# Patient Record
Sex: FEMALE | Race: WHITE | NOT HISPANIC OR LATINO | Employment: OTHER | URBAN - METROPOLITAN AREA
[De-identification: names, ages, dates, MRNs, and addresses within clinical notes are randomized per-mention and may not be internally consistent; named-entity substitution may affect disease eponyms.]

---

## 2024-02-28 ENCOUNTER — TELEPHONE (OUTPATIENT)
Dept: ONCOLOGY | Facility: CLINIC | Age: 74
End: 2024-02-28

## 2024-02-28 NOTE — TELEPHONE ENCOUNTER
Received breast referral from Dr. Brooks Staff. Referral from Dr. Meza. Pt will be seeing Dr. Treviño on Wednesday 3/6. Pt would like to be seen in comprehensive clinic with Dr. Brooks and radiation. I have scheduled pt an appt for 3/4 at 2:00. Pt verbalized understanding.

## 2024-03-04 ENCOUNTER — OFFICE VISIT (OUTPATIENT)
Dept: ONCOLOGY | Facility: CLINIC | Age: 74
End: 2024-03-04
Payer: MEDICARE

## 2024-03-04 VITALS
HEART RATE: 80 BPM | RESPIRATION RATE: 17 BRPM | SYSTOLIC BLOOD PRESSURE: 177 MMHG | DIASTOLIC BLOOD PRESSURE: 77 MMHG | WEIGHT: 141.31 LBS | TEMPERATURE: 97 F

## 2024-03-04 VITALS
DIASTOLIC BLOOD PRESSURE: 77 MMHG | RESPIRATION RATE: 17 BRPM | WEIGHT: 141.31 LBS | TEMPERATURE: 97 F | HEART RATE: 80 BPM | SYSTOLIC BLOOD PRESSURE: 177 MMHG

## 2024-03-04 DIAGNOSIS — D51.8 ANEMIA OF DECREASED VITAMIN B12 ABSORPTION: ICD-10-CM

## 2024-03-04 DIAGNOSIS — C50.911 INVASIVE DUCTAL CARCINOMA OF BREAST, FEMALE, RIGHT: Primary | ICD-10-CM

## 2024-03-04 DIAGNOSIS — C50.911 INVASIVE DUCTAL CARCINOMA OF BREAST, FEMALE, RIGHT: ICD-10-CM

## 2024-03-04 PROBLEM — C50.912 INVASIVE DUCTAL CARCINOMA OF LEFT BREAST: Status: ACTIVE | Noted: 2024-03-04

## 2024-03-04 PROBLEM — C50.912 INVASIVE DUCTAL CARCINOMA OF LEFT BREAST: Status: RESOLVED | Noted: 2024-03-04 | Resolved: 2024-03-04

## 2024-03-04 PROBLEM — C50.912 INVASIVE DUCTAL CARCINOMA OF LEFT BREAST: Chronic | Status: RESOLVED | Noted: 2024-03-04 | Resolved: 2024-03-04

## 2024-03-04 PROCEDURE — 1160F RVW MEDS BY RX/DR IN RCRD: CPT | Mod: CPTII,S$GLB,, | Performed by: RADIOLOGY

## 2024-03-04 PROCEDURE — 3077F SYST BP >= 140 MM HG: CPT | Mod: CPTII,S$GLB,, | Performed by: INTERNAL MEDICINE

## 2024-03-04 PROCEDURE — 1101F PT FALLS ASSESS-DOCD LE1/YR: CPT | Mod: CPTII,S$GLB,, | Performed by: INTERNAL MEDICINE

## 2024-03-04 PROCEDURE — 1126F AMNT PAIN NOTED NONE PRSNT: CPT | Mod: CPTII,S$GLB,, | Performed by: RADIOLOGY

## 2024-03-04 PROCEDURE — 1126F AMNT PAIN NOTED NONE PRSNT: CPT | Mod: CPTII,S$GLB,, | Performed by: INTERNAL MEDICINE

## 2024-03-04 PROCEDURE — 3077F SYST BP >= 140 MM HG: CPT | Mod: CPTII,S$GLB,, | Performed by: RADIOLOGY

## 2024-03-04 PROCEDURE — 1159F MED LIST DOCD IN RCRD: CPT | Mod: CPTII,S$GLB,, | Performed by: RADIOLOGY

## 2024-03-04 PROCEDURE — 99205 OFFICE O/P NEW HI 60 MIN: CPT | Mod: S$GLB,,, | Performed by: RADIOLOGY

## 2024-03-04 PROCEDURE — 99215 OFFICE O/P EST HI 40 MIN: CPT | Mod: S$GLB,,, | Performed by: INTERNAL MEDICINE

## 2024-03-04 PROCEDURE — 3288F FALL RISK ASSESSMENT DOCD: CPT | Mod: CPTII,S$GLB,, | Performed by: INTERNAL MEDICINE

## 2024-03-04 PROCEDURE — 3078F DIAST BP <80 MM HG: CPT | Mod: CPTII,S$GLB,, | Performed by: INTERNAL MEDICINE

## 2024-03-04 PROCEDURE — 3078F DIAST BP <80 MM HG: CPT | Mod: CPTII,S$GLB,, | Performed by: RADIOLOGY

## 2024-03-04 NOTE — PROGRESS NOTES
[facility] initial encounter note    March 4, 2024    Subjective:      Patient ID:   Dora Atwood  74 y.o. female  1950  Waleska Plasencia, Javier Villanueva MD's      Chief Complaint:   R breast Cancer    HPI:  74 y.o. female palpated a hard lump at the area of the tail of the right breast and the anterior right axilla.  The mass was hard, large, but movable and not fixed.  Dr. Meza, her PMD ordered a bilateral diagnostic mammogram and ultrasound.      She has inversion of the left and right nipple, she says this is a chronic finding and her breasts have always been with the inversion.  Pleomorphic microcalcifications extending from the upper central to the upper outer quadrant of the right breast are noted and cover an area of approximately 4 x 8 cm, these are new from prior studies.  The left breast shows no masses or microcalcifications.  There is a large mass at the right axilla measuring 4.5 cm in diameter.      Ultrasound of right breast and axilla show a 1 cm mass 3 cm from the nipple at the 9 o'clock position.  In the skin of the lateral and inferior right breast there are numerous small hypoechoic nodules on the order of 3-6 mm in size.  Ultrasound of the axilla shows a large mass with irregular borders at 3 x 4.8 cm.  Just lateral to this mass is a smaller mass at 3 x 2 cm.  The radiologist recommends biopsy of the nodule at 9:00 a.m. and the large right axillary mass presumed to be a metastatic lymph node.  He notes if the biopsy of the right breast nodule fails to demonstrate concordant histologic diagnosis, then stereotactic biopsy of the microcalcifications can be performed.    Right breast biopsy at 9:00 a.m. 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  Focal ductal carcinoma insight 2, intermediate grade, solid pattern was seen.  ERP was negative, PRP was negative, HER2 Lisa was 3+  positive    Biopsy the right axilla lymph node returned positive for metastatic carcinoma.  Residual lymphoid architecture is not seen.    She had onset of menses at age 14.  She is a  2 para 2 miscarriage 0 individual.  She delivered her 1st child at the maternal age of 25 and the 2nd child at the maternal age of 30.  She took birth control pills for approximately 10 years.  She had bilateral tubal ligation at age 30.  She went through the menopause in her mid 40s and she did take hormone replacement therapy for approximately 10 years.      Mom had rectal cancer, dad had diabetes and heart disease.  A paternal aunt had breast cancer, maternal uncle  in his 20s of some type of cancer.  A brother has heart disease.  Another brother  of an overdose, and a 3rd brother  of unknown cause.  She has a sister that she believes may have type of cancer and another sister with diabetes.  Her son and daughter are alive and well.  There is no family history known to her of ovarian, prostate, pancreas, or melanoma cancers.        ROS:   GEN: normal without any fever, night sweats or weight loss  HEENT: normal with no HA's, sore throat, stiff neck, changes in vision  CV: normal with no CP, SOB, PND, CARNEY or orthopnea  PULM: normal with no SOB, cough, hemoptysis, sputum or pleuritic pain  GI: normal with no abdominal pain, nausea, vomiting, constipation, diarrhea, melanotic stools, BRBPR, or hematemesis  : normal with no hematuria, dysuria  BREAST: See HPI  SKIN: See HPI     Hx BTL, cholecystectomy.    Review of patient's allergies indicates:  No Known Allergies    No meds.    Objective:   Vitals:  Blood pressure (!) 177/77, pulse 80, temperature 97.3 °F (36.3 °C), resp. rate 17, weight 64.1 kg (141 lb 4.8 oz).    Physical Examination:   GEN: no apparent distress, comfortable  HEAD: atraumatic and normocephalic  EYES: no pallor, no icterus  ENT: no pharyngeal erythema, external ears WNL; no nasal  discharge  NECK: no masses, thyroid normal, trachea midline, no LAD/LN's, supple  CV: RRR with no murmur; normal pulse  CHEST: Normal respiratory effort; CTAB; normal breath sounds; no wheeze or crackles  ABDOM: nontender and nondistended; soft; normal bowel sounds; no rebound/guarding  MUSC/Skeletal: ROM normal; no crepitus; joints normal; no deformities or arthropathy  EXTREM: no clubbing, cyanosis, inflammation or swelling  SKIN: no rashes, lesions, ulcers, petechiae or subcutaneous nodules  : no cvat  NEURO: grossly intact; motor/sensory WNL; no tremors  PSYCH: normal mood, affect and behavior  LYMPH: normal cervical, supraclavicular, and groin LN's  BREASTS:  Left breast is nontender, without discharge, and without palpable mass, left axilla is without palpable mass or lymphadenopathy.  Right breast has edema noted as compared to the right.  She does not have peau de orange change, and skin is intact.  I do not palpate the 1 cm tumor at the 9 o'clock position of the right breast.  She has a golf ball sized mass at the tail of the breast and anterior axillary area that is hard and movable, not fixed.  I do not palpate the 2nd lymph node in the right axilla as referenced on the radiology studies.    CBC, CMP, breast tumor markers will be ordered.  Echocardiogram for ejection fraction will be ordered.  MRI of the brain with contrast for staging will be done.  MRI of the left and right breast with contrast to check for a 2nd primary in the left breast, given her lobular features, will be done.  PET scan is being done for staging, to check for metastatic disease?    Assessment:   (1) 74 y.o. female abnormal mammogram and ultrasound with 4.8 cm mass noted at the anterior axilla and tell of the breast, also a large area of new micro calcifications are seen in the upper outer quadrant of the right breast, and a 1 cm nodule is noted at the 9 o'clock position of the right breast.    (2) biopsy of the 9:00 a.m. nodule  and the anterior axillary mass returned showing invasive mixed ductal and lobular carcinoma, grade 2, ERP negative, PRP negative, HER2 Lisa 3+ positive.    (3) my impression at this time is that she is at least stage II.  Staging studies will include MRI with contrast of the brain, MRI with contrast of the left and right breast, PET scan will be done to check for metastatic disease.  CBC, CMP, breast tumor markers have been requested and echocardiogram for ejection fraction has been requested.    (4) because of the negative ERP and PRP studies, tamoxifen or Arimidex would not be expected to help adjuvantly in decreasing her long-term systemic recurrence risk.    (5) the strongly 3+ positive HER2 Lisa would support that neoadjuvant carboplatin, taxane, Herceptin, Perjeta q. 3 weeks times 4-6 cycle may achieve a CHRIS state at the breast and axillary area after treatment.    (6) she does have the large area microcalcifications at the right breast in addition to the 9:00 a.m. biopsy-proven mass at the right breast.  The initial opinion as discussed with Dr. Villanueva, would be that she would require a right mastectomy and probably a right axillary node dissection, after the completion of neoadjuvant CTHP    (7) as discussed with Dr. Villanueva, she will probably need chest irradiation and right axillary radiation, after her definitive surgery.    (8) will forward a copy of this note to Dr. Treviño, she sees him on Wednesday.  Will ask Dr. Treviño to place a carisa catheter to facilitate systemic neoadjuvant treatment.    For now the PET scan is scheduled March 6 at 3:45 p.m..  Echocardiogram is scheduled at March 8th at 2:30 p.m..  MRI of brain is scheduled at March 8th at 3:00 p.m.  MRI of the breast is scheduled for March 21st at 3:15 p.m.  Iram is going to work on trying to get the MRI of the breast sooner.    (9) knowledge of her family history of cancer is incomplete.  A paternal aunt had breast cancer, a maternal uncle had  cancer of unknown type, a sister gives a history of possible cancer.  I have requested BRCA 1 and BRCA 2 testing with an extended profile.    I am going to ask her to follow up with myself during the week of March 11th to review the study results.  I am trying to get her started on the neoadjuvant treatment towards the end of March 11th week after the port has been placed and the local site has healed sufficiently.    I have spent 1 hour interviewing and the patient and examining the patient and discussing my findings and recommendations as outlined above.  It has taken me approximately another hour to summarize the reports, discuss with Dr. Villanueva, schedule her studies with Iram and to place this note into epic.    Alexys Boyer MD  [facility]  3/4/24

## 2024-03-04 NOTE — PROGRESS NOTES
Dora Atwood  [id number]  1950  3/4/2024  No referring provider defined for this encounter.    REASON FOR CONSULTATION: IIA, [id number] g2 IDC/ILC of UOQ R breast, ER(-)/IL(-)/HER2(3+)    TREATMENT GOAL: adjuvant    HISTORY OF PRESENT ILLNESS:   Dora Atwood is a 74 y.o. post-menopausal female with (+; paternal aunt) family history of breast cancer presented with palpable mass in the right axilla with diagnostic mammogram noting pleomorphic microcalcifications throughout the upper outer quadrant of the right breast covering 4 x 8 cm region of the large right axillary mass measuring 4.5 cm.  Ultrasound confirmed a 1 cm spiculated hypoechoic nodule in the right breast at 9:00, numerous skin based small hypoechoic nodules, 4.8 cm right axillary lymph node with an adjacent 2 x 3 cm lymph node.  Core needle biopsy returned g2 (6-7/9) invasive carcinoma with ductal and lobular features with associated grade 2 DCIS.  Biopsy from the right axilla returned metastatic carcinoma.  Tumor was ER negative, IL negative, HER2 3+.    She presents to [facility] at [facility] to meet with Dr. Brooks (Medical Oncology) and myself (Radiation Oncology) to formulate treatment plan.  She sees Dr. Treviño of General surgery.    Patient denies pain or discomfort of the breast.  She denies swelling or decreased range of motion of the right upper extremity.  Denies nipple discharge.  Denies bone pain.  Reports enlargement at right axilla occurred in the past 1 month.    Review of systems otherwise negative unless indicated in HPI.    No past medical history on file.  No past surgical history on file.  Social History     Socioeconomic History    Marital status: Single     Family History   Problem Relation Age of Onset    Rectal cancer Mother     Breast cancer Maternal Aunt        PRIOR HISTORY OF CHEMOTHERAPY OR RADIOTHERAPY: Please see HPI for patients prior oncologic history.      Review of patient's allergies  indicates:  No Known Allergies    QUALITY OF LIFE: 100%- Normal, No Complaints, No Evidence of Disease    Vitals:    03/04/24 1404   BP: (!) 177/77   Pulse: 80   Resp: 17   Temp: 97.3 °F (36.3 °C)   Weight: 64.1 kg (141 lb 4.8 oz)   PainSc: 0-No pain     There is no height or weight on file to calculate BMI.    PHYSICAL EXAM:   GENERAL: alert; in no apparent distress.   HEAD: normocephalic, atraumatic.  EYES: pupils are equal, round, reactive to light and accommodation. Sclera anicteric. Conjunctiva not injected.   NOSE/THROAT: no nasal erythema or rhinorrhea. Oropharynx pink, without erythema, ulcerations or thrush.   NECK: no cervical motion rigidity; supple with no masses.    CHEST: Patient is speaking comfortably on room air with normal work of breathing without using accessory muscles of respiration.  CARDIOVASCULAR: regular rate and rhythm  ABDOMEN: soft, nontender, nondistended.   MUSCULOSKELETAL: no tenderness to palpation along the spine or scapulae. Normal range of motion.  NEUROLOGIC: cranial nerves II-XII intact bilaterally. Strength 5/5 in bilateral upper and lower extremities. No sensory deficits appreciated. Normal gait.  LYMPHATIC: bulky R level I-II axillary adenopathy appreciated at >5cm. ?fixed   EXTREMITIES: no clubbing, cyanosis, edema.  SKIN: no erythema, rashes, ulcerations noted.   BREAST: small palpable nodule at 9:00 with trace edema.  No peau de orange, erythema, cellulitis or fluctuance; no ulceration.    REVIEW OF IMAGING/PATHOLOGY/LABS: Please see HPI. All images reviewed personally by dictating physician.     ASSESSMENT: Dora Atwood is a 74 y.o. female with stage IIA, [id number] g2 IDC/ILC of UOQ R breast, ER(-)/RI(-)/HER2(3+)  PLAN:  Dora Atwood presents with a large palpable high right axillary mass with diagnostic mammogram and ultrasound confirming a 1cm spiculated nodule in the right breast, (presumed site of origin), additional skin based small hypoechoic nodules and 8 cm of  pleomorphic calcifications in the UOQ.  She has nearly 8 cm of axillary disease.  Core needle biopsy returned grade 2 mixed invasive ductal/lobular carcinoma, hormone receptor negative, HER2 3+.  Consensus recommendation is to proceed with BRCA testing, noting family history, as well as staging PET-CT and pretreatment MRI of the breast.  Dr. Brooks met with the patient today and discuss neoadjuvant TCHP as well as potential adjuvant indications for anti-HER2 therapy.  She will meet with Dr. Treviño to discuss surgical options.  Will await MRI but noting the primary site with bulky high axillary adenopathy, diffuse pleomorphic calcifications and questionable skin nodules, she may be best served by mastectomy.  She will address this with potential reconstruction options with Dr. Treviño.  Today I briefly discussed sentinel lymph node biopsy versus axillary lymph node dissection which may be warranted pending post neoadjuvant TCHP MRI and operative findings, again noting extent of bulky adenopathy at today's visit.    Today I discussed utility of adjuvant radiotherapy following mastectomy in clinically node positive patients at high risk for local recurrence with demonstrated survival benefit.  Citing factors above she is certainly at risk for local recurrence within the chest wall or draining lymphatics and I do advise adjuvant radiotherapy to these sites, 5040 cGy with potential boosting any residual/undissected LNs (level III, ie).  Chest wall boost to be dictated by final pathology.  I anticipate treatment using 3D conformal techniques using a tangential beam approach and deep inspiratory breath protocol to spare the underlying lung.  Treatment likely to be done concurrently with anti HER2 therapy.    She understands that if M1 on PET, treatment recommendations may change.    I carefully explained the process of simulation and treatment delivery with weekly physician visits. Patient wishes to proceed.     We discussed  the risks and benefits of the above treatment and have gone over in detail the acute and late toxicities of radiation therapy to the R CW + RNI.     Consent deferred.  Will plan on meeting with the patient 4 weeks postoperatively to discuss radiotherapy in detail.    The patient has our contact information and understands that they are free to contact us at any time with questions or concerns regarding radiation therapy.     DISPOSITION: RTC FOR CT SIM AFTER CURRENT THERAPY     I have personally seen and evaluated this patient with a high complexity diagnosis.      Greater than 60 minutes were dedicated to reviewing/interpreting pertinent laboratory/imaging/pathology as well as prior consultations; reviewing and performing history and physical; counseling patient on oncologic recommendations; documentation in the electronic medical record including ordering of additional tests and/or radiation treatment protocol; and coordination of care with physicians with referrals placed as appropriate.    PHYSICIAN: Meño Villanueva Jr, MD    Thank you for the opportunity to meet and consult with Dora Atwood.   Please feel free to contact me to discuss the above recommendation further.

## 2024-03-04 NOTE — LETTER
March 4, 2024        Demarco Meza MD  [address]  [address]  [place] [address]             [facility]  [address], [address]  [place] [address]  Phone: [phone]  Fax: [phone]   Patient: Dora Atwood   MR Number: [id number]   YOB: 1950   Date of Visit: 3/4/2024       Dear Dr. Meza:    Thank you for referring Dora Atwood to me for evaluation. Below are the relevant portions of my assessment and plan of care.    No diagnosis found.  There are no diagnoses linked to this encounter.  No follow-ups on file.    I have explained and the patient understands all of  the current recommendation(s). I have answered all of their questions to the best of my ability and to their complete satisfaction.           If you have questions, please do not hesitate to call me. I look forward to following Dora along with you.    Sincerely,      SIMONE Brooks MD           CC    No Recipients

## 2024-03-04 NOTE — Clinical Note
If M0, TCHP then likely mastectomy + ax dissection (Gray); us 4wks post-op for R CW + nodes RT using DIBH

## 2024-03-05 ENCOUNTER — TELEPHONE (OUTPATIENT)
Dept: HEMATOLOGY/ONCOLOGY | Facility: CLINIC | Age: 74
End: 2024-03-05

## 2024-03-05 NOTE — TELEPHONE ENCOUNTER
Met with pt and  in comprehensive breast clinic on 3/4.  I answered questions. Ambry genetic testing was drawn during this visit. Pt was given a copy of my information as well as the providers she saw during the visit. I instructed pt to contact me at anytime with questions or concerns.  She verbalized understanding.  I was able to schedule pt appts for pet scan, echo, mri brain, and mri breast while pt in office.         Pt would like to reschedule appt with dr. Treviño due to all the appts with the scans. Her  is going out of town. I spoke with Dr. Butcher office. Pt rescheduled for 3/12 at 2:30. Spoke with pt. She verbalized understanding.

## 2024-03-06 ENCOUNTER — HOSPITAL ENCOUNTER (OUTPATIENT)
Dept: RADIOLOGY | Facility: HOSPITAL | Age: 74
Discharge: HOME | End: 2024-03-06
Attending: INTERNAL MEDICINE
Payer: MEDICARE

## 2024-03-06 DIAGNOSIS — C50.911 INVASIVE DUCTAL CARCINOMA OF BREAST, FEMALE, RIGHT: ICD-10-CM

## 2024-03-06 LAB — GLUCOSE SERPL-MCNC: 83 MG/DL (ref 70–110)

## 2024-03-06 PROCEDURE — A9552 F18 FDG: HCPCS | Mod: PN | Performed by: INTERNAL MEDICINE

## 2024-03-06 PROCEDURE — 78815 PET IMAGE W/CT SKULL-THIGH: CPT | Mod: 26,PI,, | Performed by: RADIOLOGY

## 2024-03-06 PROCEDURE — 78815 PET IMAGE W/CT SKULL-THIGH: CPT | Mod: TC,PN

## 2024-03-06 RX ORDER — FLUDEOXYGLUCOSE F18 500 MCI/ML
13.1 INJECTION INTRAVENOUS
Status: COMPLETED | OUTPATIENT
Start: 2024-03-06 | End: 2024-03-06

## 2024-03-06 RX ADMIN — FLUDEOXYGLUCOSE F-18 13.1 MILLICURIE: 500 INJECTION INTRAVENOUS at 03:03

## 2024-03-06 NOTE — PROGRESS NOTES
PET Imaging Questionnaire    Are you a Diabetic? Recent Blood Sugar level? No    Are you anemic? Bone Marrow Stimulation Meds? No    Have you had a CT Scan, if so when & where was your last one? No    Have you had a PET Scan, if so when & where was your last one? No    Chemotherapy or currently on Chemotherapy? No    Radiation therapy? No    Surgical History: No past surgical history on file.     Have you been fasting for at least 6 hours? Yes    Is there any chance you may be pregnant or breastfeeding? No    Assay: 13.5 MCi@:15:06   Injection Site: AC    Residual: 0.4 mCi@: 15:10   Technologist: Jerry Acuña Injected:13.1 mCi

## 2024-03-08 ENCOUNTER — CLINICAL SUPPORT (OUTPATIENT)
Dept: CARDIOLOGY | Facility: HOSPITAL | Age: 74
End: 2024-03-08
Attending: INTERNAL MEDICINE
Payer: MEDICARE

## 2024-03-08 VITALS — BODY MASS INDEX: 23.54 KG/M2 | HEIGHT: 65 IN | WEIGHT: 141.31 LBS

## 2024-03-08 DIAGNOSIS — C50.911 INVASIVE DUCTAL CARCINOMA OF BREAST, FEMALE, RIGHT: ICD-10-CM

## 2024-03-08 PROCEDURE — 93306 TTE W/DOPPLER COMPLETE: CPT

## 2024-03-08 PROCEDURE — 93306 TTE W/DOPPLER COMPLETE: CPT | Mod: 26,,, | Performed by: INTERNAL MEDICINE

## 2024-03-11 ENCOUNTER — TELEPHONE (OUTPATIENT)
Dept: HEMATOLOGY/ONCOLOGY | Facility: CLINIC | Age: 74
End: 2024-03-11

## 2024-03-11 DIAGNOSIS — C50.911 INVASIVE DUCTAL CARCINOMA OF BREAST, FEMALE, RIGHT: Primary | ICD-10-CM

## 2024-03-14 ENCOUNTER — HOSPITAL ENCOUNTER (OUTPATIENT)
Dept: RADIOLOGY | Facility: HOSPITAL | Age: 74
Discharge: HOME | End: 2024-03-14
Payer: MEDICARE

## 2024-03-14 ENCOUNTER — HOSPITAL ENCOUNTER (OUTPATIENT)
Dept: PREADMISSION TESTING | Facility: HOSPITAL | Age: 74
Discharge: HOME | End: 2024-03-14
Attending: SURGERY
Payer: MEDICARE

## 2024-03-14 VITALS
DIASTOLIC BLOOD PRESSURE: 76 MMHG | WEIGHT: 143.31 LBS | HEART RATE: 89 BPM | OXYGEN SATURATION: 98 % | SYSTOLIC BLOOD PRESSURE: 131 MMHG | RESPIRATION RATE: 18 BRPM | TEMPERATURE: 98 F | HEIGHT: 65 IN | BODY MASS INDEX: 23.88 KG/M2

## 2024-03-14 DIAGNOSIS — Z01.818 PRE-OP TESTING: ICD-10-CM

## 2024-03-14 DIAGNOSIS — Z01.818 PRE-OP TESTING: Primary | ICD-10-CM

## 2024-03-14 PROCEDURE — 93005 ELECTROCARDIOGRAM TRACING: CPT | Performed by: GENERAL PRACTICE

## 2024-03-14 PROCEDURE — 71046 X-RAY EXAM CHEST 2 VIEWS: CPT | Mod: TC

## 2024-03-14 PROCEDURE — 93010 ELECTROCARDIOGRAM REPORT: CPT | Mod: ,,, | Performed by: GENERAL PRACTICE

## 2024-03-14 RX ORDER — MULTIVITAMIN
1 TABLET ORAL DAILY
COMMUNITY

## 2024-03-14 RX ORDER — CEFAZOLIN SODIUM 2 G/50ML
2 SOLUTION INTRAVENOUS ONCE
Status: CANCELLED | OUTPATIENT
Start: 2024-03-19

## 2024-03-14 RX ORDER — FERROUS GLUCONATE 324(38)MG
324 TABLET ORAL
COMMUNITY

## 2024-03-14 NOTE — DISCHARGE INSTRUCTIONS
To confirm, Your doctor has instructed you that surgery is scheduled for:   Tuesday, March 19, 2024    Pre-Op will call the afternoon prior to surgery between 4:00 and 6:00 PM with the final arrival time.    Monday, March 18, 2024    Please report to [facility] [place] via [facility] entrance. Check in at registration desk.    Do not eat or drink anything after midnight the night before your surgery - THIS INCLUDES  WATER, GUM, MINTS AND CANDY.  YOU MAY BRUSH YOUR TEETH BUT DO NOT SWALLOW       PLEASE NOTE:  The surgery schedule has many variables which may affect the time of your surgery case.  Family members should be available if your surgery time changes.  Plan to be here the day of your procedure between 4-6 hours.      DO NOT TAKE THESE MEDICATIONS 5-7 DAYS PRIOR to your procedure or per your surgeon's request: ASPIRIN, ALEVE, ADVIL, IBUPROFEN,  ANA SELTZER, BC , FISH OIL , VITAMIN E, HERBALS  (May take Tylenol)                                                      IMPORTANT INSTRUCTIONS    Shower the night before AND the morning of your procedure with a Chlorhexidine wash such as Hibiclens or Dial antibacterial soap from the neck down. Do not apply any deodorants, lotions or powders after each shower.  Do not get it on your face or in your eyes.  You may use your own shampoo and face wash. This helps your skin to be as bacteria free as possible.  Sleep in a bed with clean sheets.  Do not sleep with a pet in the bed.   Please leave all jewelry, piercing's and valuables at home.     Make arrangements in advance for transportation home by a responsible adult.    You must make arrangements for transportation, TAXI'S, UBER'S OR LYFTS ARE NOT ALLOWED.      If you have any questions about these instructions, call Pre-Op Admit  Nursing at [phone] or the Pre-Op Day Surgery Unit at [phone].

## 2024-03-15 ENCOUNTER — HOSPITAL ENCOUNTER (OUTPATIENT)
Dept: RADIOLOGY | Facility: HOSPITAL | Age: 74
Discharge: HOME | End: 2024-03-15
Attending: INTERNAL MEDICINE
Payer: MEDICARE

## 2024-03-15 ENCOUNTER — OFFICE VISIT (OUTPATIENT)
Dept: HEMATOLOGY/ONCOLOGY | Facility: CLINIC | Age: 74
End: 2024-03-15
Payer: MEDICARE

## 2024-03-15 VITALS
TEMPERATURE: 97 F | HEIGHT: 65 IN | BODY MASS INDEX: 23.91 KG/M2 | HEART RATE: 74 BPM | DIASTOLIC BLOOD PRESSURE: 89 MMHG | RESPIRATION RATE: 18 BRPM | WEIGHT: 143.5 LBS | SYSTOLIC BLOOD PRESSURE: 146 MMHG

## 2024-03-15 DIAGNOSIS — C50.911 INVASIVE DUCTAL CARCINOMA OF BREAST, FEMALE, RIGHT: Primary | ICD-10-CM

## 2024-03-15 DIAGNOSIS — C50.911 INVASIVE DUCTAL CARCINOMA OF BREAST, FEMALE, RIGHT: ICD-10-CM

## 2024-03-15 PROCEDURE — 99214 OFFICE O/P EST MOD 30 MIN: CPT | Mod: S$GLB,,, | Performed by: INTERNAL MEDICINE

## 2024-03-15 PROCEDURE — 70470 CT HEAD/BRAIN W/O & W/DYE: CPT | Mod: TC

## 2024-03-15 PROCEDURE — 1101F PT FALLS ASSESS-DOCD LE1/YR: CPT | Mod: CPTII,S$GLB,, | Performed by: INTERNAL MEDICINE

## 2024-03-15 PROCEDURE — 3079F DIAST BP 80-89 MM HG: CPT | Mod: CPTII,S$GLB,, | Performed by: INTERNAL MEDICINE

## 2024-03-15 PROCEDURE — 1159F MED LIST DOCD IN RCRD: CPT | Mod: CPTII,S$GLB,, | Performed by: INTERNAL MEDICINE

## 2024-03-15 PROCEDURE — 3288F FALL RISK ASSESSMENT DOCD: CPT | Mod: CPTII,S$GLB,, | Performed by: INTERNAL MEDICINE

## 2024-03-15 PROCEDURE — 1125F AMNT PAIN NOTED PAIN PRSNT: CPT | Mod: CPTII,S$GLB,, | Performed by: INTERNAL MEDICINE

## 2024-03-15 PROCEDURE — 3008F BODY MASS INDEX DOCD: CPT | Mod: CPTII,S$GLB,, | Performed by: INTERNAL MEDICINE

## 2024-03-15 PROCEDURE — 3077F SYST BP >= 140 MM HG: CPT | Mod: CPTII,S$GLB,, | Performed by: INTERNAL MEDICINE

## 2024-03-15 PROCEDURE — 25500020 PHARM REV CODE 255: Performed by: INTERNAL MEDICINE

## 2024-03-15 RX ORDER — HYDROCODONE BITARTRATE AND ACETAMINOPHEN 5; 325 MG/1; MG/1
1 TABLET ORAL EVERY 6 HOURS PRN
Qty: 30 TABLET | Refills: 0 | Status: SHIPPED | OUTPATIENT
Start: 2024-03-15

## 2024-03-15 RX ADMIN — IOHEXOL 50 ML: 350 INJECTION, SOLUTION INTRAVENOUS at 01:03

## 2024-03-15 NOTE — PROGRESS NOTES
[facility] Hematology Oncology Subsequent  encounter note    March 15, 2024    Subjective:      Patient ID:   Dora Atwood  74 y.o. female  1950  Waleska Plasencia, Javier Villanueva MD's      Chief Complaint:   R breast Cancer    HPI:  One hour spent discussing, examining, planning neoadj. Rx. Today.  74 y.o. female palpated a hard lump at the area of the tail of the right breast and the anterior right axilla.  The mass was hard, large, but movable and not fixed.  Dr. Meza, her PMD ordered a bilateral diagnostic mammogram and ultrasound.      She has inversion of the left and right nipple, she says this is a chronic finding and her breasts have always been with the inversion.  Pleomorphic microcalcifications extending from the upper central to the upper outer quadrant of the right breast are noted and cover an area of approximately 4 x 8 cm, these are new from prior studies.  The left breast shows no masses or microcalcifications.  There is a large mass at the right axilla measuring 4.5 cm in diameter.      Ultrasound of right breast and axilla show a 1 cm mass 3 cm from the nipple at the 9 o'clock position.  In the skin of the lateral and inferior right breast there are numerous small hypoechoic nodules on the order of 3-6 mm in size.  Ultrasound of the axilla shows a large mass with irregular borders at 3 x 4.8 cm.  Just lateral to this mass is a smaller mass at 3 x 2 cm.  The radiologist recommends biopsy of the nodule at 9:00 a.m. and the large right axillary mass presumed to be a metastatic lymph node.  He notes if the biopsy of the right breast nodule fails to demonstrate concordant histologic diagnosis, then stereotactic biopsy of the microcalcifications can be performed.    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  Focal ductal carcinoma insight 2, intermediate grade, solid pattern was  seen.  ERP was negative, PRP was negative, HER2 Lisa was 3+ positive    Biopsy the right axilla lymph node returned positive for metastatic carcinoma.  Residual lymphoid architecture is not seen.    She had onset of menses at age 14.  She is a  2 para 2 miscarriage 0 individual.  She delivered her 1st child at the maternal age of 25 and the 2nd child at the maternal age of 30.  She took birth control pills for approximately 10 years.  She had bilateral tubal ligation at age 30.  She went through the menopause in her mid 40s and she did take hormone replacement therapy for approximately 10 years.      Mom had rectal cancer, dad had diabetes and heart disease.  A paternal aunt had breast cancer, maternal uncle  in his 20s of some type of cancer.  A brother has heart disease.  Another brother  of an overdose, and a 3rd brother  of unknown cause.  She has a sister that she believes may have type of cancer and another sister with diabetes.  Her son and daughter are alive and well.  There is no family history known to her of ovarian, prostate, pancreas, or melanoma cancers.      CAT of head negative for metastatic dx.  ECHO call for Ej Fx  MRI of breast due next week.  PET uptake in mass, R supraclavicular LN, infraclavicular LN, internal mammary LN, R axillary LN, and mild increase in FGD  with skin thickening and edema.    Hgb 11.9., CMP NL.  BrCa 1 & 2 pending.  For CXR, port placement 3/19/24.      ROS:   GEN: normal without any fever, night sweats or weight loss  HEENT: normal with no HA's, sore throat, stiff neck, changes in vision  CV: normal with no CP, SOB, PND, CARNEY or orthopnea  PULM: normal with no SOB, cough, hemoptysis, sputum or pleuritic pain  GI: normal with no abdominal pain, nausea, vomiting, constipation, diarrhea, melanotic stools, BRBPR, or hematemesis  : normal with no hematuria, dysuria  BREAST: See HPI  SKIN: See HPI     Hx BTL, cholecystectomy.    Review of patient's allergies  "indicates:  No Known Allergies    No meds.    Objective:   Vitals:  Blood pressure (!) 146/89, pulse 74, temperature 96.9 °F (36.1 °C), resp. rate 18, height 5' 5" (1.651 m), weight 65.1 kg (143 lb 8 oz).    Physical Examination:   GEN: no apparent distress, comfortable  HEAD: atraumatic and normocephalic  EYES: no pallor, no icterus  ENT: no pharyngeal erythema, external ears WNL; no nasal discharge  NECK: no masses, thyroid normal, trachea midline, no LAD/LN's, supple  CV: RRR with no murmur; normal pulse  CHEST: Normal respiratory effort; CTAB; normal breath sounds; no wheeze or crackles  ABDOM: nontender and nondistended; soft; normal bowel sounds; no rebound/guarding  MUSC/Skeletal: ROM normal; no crepitus; joints normal; no deformities or arthropathy  EXTREM: no clubbing, cyanosis, inflammation or swelling  SKIN: no rashes, lesions, ulcers, petechiae or subcutaneous nodules  : no cvat  NEURO: grossly intact; motor/sensory WNL; no tremors  PSYCH: normal mood, affect and behavior  LYMPH: normal cervical, supraclavicular, and groin LN's  BREASTS:  Left breast is nontender, without discharge, and without palpable mass, left axilla is without palpable mass or lymphadenopathy.  Right breast has edema noted as compared to the right.  She does not have peau de orange change, and skin is intact.  I do not palpate the 1 cm tumor at the 9 o'clock position of the right breast.  She has a golf ball sized mass at the tail of the breast and anterior axillary area that is hard and movable, not fixed.  I do not palpate the 2nd lymph node in the right axilla as referenced on the radiology studies.    CBC, CMP, breast tumor markers will be ordered.  Echocardiogram for ejection fraction will be ordered.  MRI of the brain with contrast for staging will be done.  MRI of the left and right breast with contrast to check for a 2nd primary in the left breast, given her lobular features, will be done.  PET scan is being done for " staging, to check for metastatic disease?    Assessment:   (1) 74 y.o. female abnormal mammogram and ultrasound with 4.8 cm mass noted at the anterior axilla and tell of the breast, also a large area of new micro calcifications are seen in the upper outer quadrant of the right breast, and a 1 cm nodule is noted at the 9 o'clock position of the right breast.    (2) biopsy of the 9:00 a.m. nodule and the anterior axillary mass returned showing invasive mixed ductal and lobular carcinoma, grade 2, ERP negative, PRP negative, HER2 Lisa 3+ positive.    (3) my impression at this time is that she is at least stage II.  Staging studies will include MRI with contrast of the brain, MRI with contrast of the left and right breast, PET scan will be done to check for metastatic disease.  CBC, CMP, breast tumor markers have been requested and echocardiogram for ejection fraction has been requested.    (4) because of the negative ERP and PRP studies, tamoxifen or Arimidex would not be expected to help adjuvantly in decreasing her long-term systemic recurrence risk.    (5) the strongly 3+ positive HER2 Lisa would support that neoadjuvant carboplatin, taxane, Herceptin, Perjeta q. 3 weeks times 4-6 cycle may achieve a CHRIS state at the breast and axillary area after treatment.    (6) she does have the large area microcalcifications at the right breast in addition to the 9:00 a.m. biopsy-proven mass at the right breast.  The initial opinion as discussed with Dr. Villanueva, would be that she would require a right mastectomy and probably a right axillary node dissection, after the completion of neoadjuvant CTHP    (7) as discussed with Dr. Villanueva, she will probably need chest irradiation and right axillary radiation, after her definitive surgery.    (8) will forward a copy of this note to Dr. Treviño, she sees him on Wednesday.  Will ask Dr. Treviño to place a carisa catheter to facilitate systemic neoadjuvant treatment.    For now the PET scan is  scheduled March 6 at 3:45 p.m..  Echocardiogram is scheduled at March 8th at 2:30 p.m..  MRI of brain is scheduled at March 8th at 3:00 p.m.  MRI of the breast is scheduled for March 21st at 3:15 p.m.  Iram is going to work on trying to get the MRI of the breast sooner.    (9) knowledge of her family history of cancer is incomplete.  A paternal aunt had breast cancer, a maternal uncle had cancer of unknown type, a sister gives a history of possible cancer.  I have requested BRCA 1 and BRCA 2 testing with an extended profile.    I am going to ask her to follow up with myself during the week of March 11th to review the study results.  I am trying to get her started on the neoadjuvant treatment towards the end of March 11th week after the port has been placed and the local site has healed sufficiently.    I have spent 1 hour interviewing and the patient and examining the patient and discussing my findings and recommendations as outlined above.  It has taken me approximately another hour to summarize the reports, discuss with Dr. Villanueva, schedule her studies with Iram and to place this note into [employer],  On the visit at the comprehensive clinic last time.    Today 3/15/24  call for Ej Fx, await MRI of breasts, port placement.    For CTHP neoadjuvant Rx q 3 weeks x's 4-6 cycles.  Begin 4/3/24.  Udenyca support.  See me 4/3/24.                        +

## 2024-03-17 ENCOUNTER — TELEPHONE (OUTPATIENT)
Dept: HEMATOLOGY/ONCOLOGY | Facility: CLINIC | Age: 74
End: 2024-03-17

## 2024-03-19 ENCOUNTER — ANESTHESIA (OUTPATIENT)
Dept: SURGERY | Facility: HOSPITAL | Age: 74
End: 2024-03-19
Payer: MEDICARE

## 2024-03-19 ENCOUNTER — ANESTHESIA EVENT (OUTPATIENT)
Dept: SURGERY | Facility: HOSPITAL | Age: 74
End: 2024-03-19
Payer: MEDICARE

## 2024-03-19 ENCOUNTER — HOSPITAL ENCOUNTER (OUTPATIENT)
Facility: HOSPITAL | Age: 74
Discharge: HOME | End: 2024-03-19
Attending: SURGERY | Admitting: SURGERY
Payer: MEDICARE

## 2024-03-19 VITALS
OXYGEN SATURATION: 97 % | TEMPERATURE: 98 F | SYSTOLIC BLOOD PRESSURE: 135 MMHG | RESPIRATION RATE: 16 BRPM | HEART RATE: 64 BPM | DIASTOLIC BLOOD PRESSURE: 77 MMHG

## 2024-03-19 DIAGNOSIS — Z01.818 PRE-OP TESTING: ICD-10-CM

## 2024-03-19 DIAGNOSIS — C50.911 INVASIVE DUCTAL CARCINOMA OF BREAST, FEMALE, RIGHT: Primary | ICD-10-CM

## 2024-03-19 LAB
AORTIC ROOT ANNULUS: 2.8 CM
AORTIC VALVE CUSP SEPERATION: 1.9 CM
AV INDEX (PROSTH): 0.74
AV MEAN GRADIENT: 8 MMHG
AV PEAK GRADIENT: 14 MMHG
AV VALVE AREA BY VELOCITY RATIO: 1.91 CM²
AV VALVE AREA: 1.89 CM²
AV VELOCITY RATIO: 0.75
BSA FOR ECHO PROCEDURE: 1.71 M2
CV ECHO LV RWT: 0.52 CM
DOP CALC AO PEAK VEL: 1.88 M/S
DOP CALC AO VTI: 36.4 CM
DOP CALC LVOT AREA: 2.5 CM2
DOP CALC LVOT DIAMETER: 1.8 CM
DOP CALC LVOT PEAK VEL: 1.41 M/S
DOP CALC LVOT STROKE VOLUME: 68.67 CM3
DOP CALC MV VTI: 28.9 CM
DOP CALCLVOT PEAK VEL VTI: 27 CM
E WAVE DECELERATION TIME: 208 MSEC
E/A RATIO: 0.75
E/E' RATIO: 10.24 M/S
ECHO LV POSTERIOR WALL: 0.94 CM (ref 0.6–1.1)
FRACTIONAL SHORTENING: 38 % (ref 28–44)
INTERVENTRICULAR SEPTUM: 1.27 CM (ref 0.6–1.1)
IVC DIAMETER: 2.38 CM
LEFT INTERNAL DIMENSION IN SYSTOLE: 2.27 CM (ref 2.1–4)
LEFT VENTRICLE DIASTOLIC VOLUME INDEX: 32.69 ML/M2
LEFT VENTRICLE DIASTOLIC VOLUME: 55.9 ML
LEFT VENTRICLE MASS INDEX: 74 G/M2
LEFT VENTRICLE SYSTOLIC VOLUME INDEX: 10.2 ML/M2
LEFT VENTRICLE SYSTOLIC VOLUME: 17.5 ML
LEFT VENTRICULAR INTERNAL DIMENSION IN DIASTOLE: 3.64 CM (ref 3.5–6)
LEFT VENTRICULAR MASS: 127.04 G
LV LATERAL E/E' RATIO: 14.5 M/S
LV SEPTAL E/E' RATIO: 7.91 M/S
LVOT MG: 4 MMHG
LVOT MV: 0.98 CM/S
MV MEAN GRADIENT: 3 MMHG
MV PEAK A VEL: 1.16 M/S
MV PEAK E VEL: 0.87 M/S
MV PEAK GRADIENT: 7 MMHG
MV STENOSIS PRESSURE HALF TIME: 75 MS
MV VALVE AREA BY CONTINUITY EQUATION: 2.38 CM2
MV VALVE AREA P 1/2 METHOD: 2.93 CM2
PISA MRMAX VEL: 2.57 M/S
PISA TR MAX VEL: 2.64 M/S
PV MV: 0.6 M/S
PV PEAK GRADIENT: 3 MMHG
PV PEAK VELOCITY: 0.9 M/S
RA PRESSURE ESTIMATED: 8 MMHG
RV TB RVSP: 11 MMHG
RV TISSUE DOPPLER FREE WALL SYSTOLIC VELOCITY 1 (APICAL 4 CHAMBER VIEW): 19.9 CM/S
TDI LATERAL: 0.06 M/S
TDI SEPTAL: 0.11 M/S
TDI: 0.09 M/S
TR MAX PG: 28 MMHG
TRICUSPID ANNULAR PLANE SYSTOLIC EXCURSION: 3.39 CM
TV REST PULMONARY ARTERY PRESSURE: 36 MMHG
Z-SCORE OF LEFT VENTRICULAR DIMENSION IN END DIASTOLE: -2.66
Z-SCORE OF LEFT VENTRICULAR DIMENSION IN END SYSTOLE: -2.05

## 2024-03-19 PROCEDURE — D9220A PRA ANESTHESIA: Mod: CRNA,,, | Performed by: NURSE ANESTHETIST, CERTIFIED REGISTERED

## 2024-03-19 PROCEDURE — 63600175 PHARM REV CODE 636 W HCPCS: Performed by: NURSE ANESTHETIST, CERTIFIED REGISTERED

## 2024-03-19 PROCEDURE — 25000003 PHARM REV CODE 250: Performed by: NURSE ANESTHETIST, CERTIFIED REGISTERED

## 2024-03-19 PROCEDURE — 71000015 HC POSTOP RECOV 1ST HR: Performed by: SURGERY

## 2024-03-19 PROCEDURE — 37000008 HC ANESTHESIA 1ST 15 MINUTES: Performed by: SURGERY

## 2024-03-19 PROCEDURE — 37000009 HC ANESTHESIA EA ADD 15 MINS: Performed by: SURGERY

## 2024-03-19 PROCEDURE — C1788 PORT, INDWELLING, IMP: HCPCS | Performed by: SURGERY

## 2024-03-19 PROCEDURE — D9220A PRA ANESTHESIA: Mod: ANES,,, | Performed by: ANESTHESIOLOGY

## 2024-03-19 PROCEDURE — 25000003 PHARM REV CODE 250: Performed by: SURGERY

## 2024-03-19 PROCEDURE — 36000707: Performed by: SURGERY

## 2024-03-19 PROCEDURE — 36000706: Performed by: SURGERY

## 2024-03-19 PROCEDURE — 71000033 HC RECOVERY, INTIAL HOUR: Performed by: SURGERY

## 2024-03-19 PROCEDURE — 63600175 PHARM REV CODE 636 W HCPCS: Performed by: SURGERY

## 2024-03-19 PROCEDURE — 71000039 HC RECOVERY, EACH ADD'L HOUR: Performed by: SURGERY

## 2024-03-19 DEVICE — KIT POWERPORT SINGLE 8FR: Type: IMPLANTABLE DEVICE | Site: CHEST | Status: FUNCTIONAL

## 2024-03-19 RX ORDER — PROPOFOL 10 MG/ML
VIAL (ML) INTRAVENOUS
Status: DISCONTINUED | OUTPATIENT
Start: 2024-03-19 | End: 2024-03-19

## 2024-03-19 RX ORDER — ONDANSETRON HYDROCHLORIDE 2 MG/ML
4 INJECTION, SOLUTION INTRAVENOUS DAILY PRN
Status: DISCONTINUED | OUTPATIENT
Start: 2024-03-19 | End: 2024-03-19 | Stop reason: HOSPADM

## 2024-03-19 RX ORDER — HEPARIN SODIUM 1000 [USP'U]/ML
INJECTION, SOLUTION INTRAVENOUS; SUBCUTANEOUS
Status: DISCONTINUED | OUTPATIENT
Start: 2024-03-19 | End: 2024-03-19 | Stop reason: HOSPADM

## 2024-03-19 RX ORDER — DIPHENHYDRAMINE HYDROCHLORIDE 50 MG/ML
12.5 INJECTION INTRAMUSCULAR; INTRAVENOUS
Status: DISCONTINUED | OUTPATIENT
Start: 2024-03-19 | End: 2024-03-19 | Stop reason: HOSPADM

## 2024-03-19 RX ORDER — LIDOCAINE HYDROCHLORIDE 10 MG/ML
INJECTION, SOLUTION INTRAVENOUS
Status: DISCONTINUED | OUTPATIENT
Start: 2024-03-19 | End: 2024-03-19

## 2024-03-19 RX ORDER — DEXAMETHASONE SODIUM PHOSPHATE 4 MG/ML
INJECTION, SOLUTION INTRA-ARTICULAR; INTRALESIONAL; INTRAMUSCULAR; INTRAVENOUS; SOFT TISSUE
Status: DISCONTINUED | OUTPATIENT
Start: 2024-03-19 | End: 2024-03-19

## 2024-03-19 RX ORDER — ONDANSETRON HYDROCHLORIDE 2 MG/ML
4 INJECTION, SOLUTION INTRAVENOUS EVERY 12 HOURS PRN
Status: CANCELLED | OUTPATIENT
Start: 2024-03-19

## 2024-03-19 RX ORDER — ONDANSETRON HYDROCHLORIDE 2 MG/ML
INJECTION, SOLUTION INTRAVENOUS
Status: DISCONTINUED | OUTPATIENT
Start: 2024-03-19 | End: 2024-03-19

## 2024-03-19 RX ORDER — MEPERIDINE HYDROCHLORIDE 50 MG/ML
12.5 INJECTION INTRAMUSCULAR; INTRAVENOUS; SUBCUTANEOUS EVERY 10 MIN PRN
Status: DISCONTINUED | OUTPATIENT
Start: 2024-03-19 | End: 2024-03-19 | Stop reason: HOSPADM

## 2024-03-19 RX ORDER — PHENYLEPHRINE HYDROCHLORIDE 10 MG/ML
INJECTION INTRAVENOUS
Status: DISCONTINUED | OUTPATIENT
Start: 2024-03-19 | End: 2024-03-19

## 2024-03-19 RX ORDER — HYDROCODONE BITARTRATE AND ACETAMINOPHEN 5; 325 MG/1; MG/1
1 TABLET ORAL EVERY 8 HOURS PRN
Qty: 12 TABLET | Refills: 0 | Status: SHIPPED | OUTPATIENT
Start: 2024-03-19

## 2024-03-19 RX ORDER — CEFAZOLIN SODIUM 2 G/50ML
2 SOLUTION INTRAVENOUS ONCE
Status: DISCONTINUED | OUTPATIENT
Start: 2024-03-19 | End: 2024-03-19 | Stop reason: HOSPADM

## 2024-03-19 RX ORDER — CEFAZOLIN SODIUM 1 G/3ML
INJECTION, POWDER, FOR SOLUTION INTRAMUSCULAR; INTRAVENOUS
Status: DISCONTINUED | OUTPATIENT
Start: 2024-03-19 | End: 2024-03-19

## 2024-03-19 RX ORDER — HYDROCODONE BITARTRATE AND ACETAMINOPHEN 5; 325 MG/1; MG/1
1 TABLET ORAL EVERY 6 HOURS PRN
Status: CANCELLED | OUTPATIENT
Start: 2024-03-19

## 2024-03-19 RX ORDER — BUPIVACAINE HYDROCHLORIDE AND EPINEPHRINE 5; 5 MG/ML; UG/ML
INJECTION, SOLUTION EPIDURAL; INTRACAUDAL; PERINEURAL
Status: DISCONTINUED | OUTPATIENT
Start: 2024-03-19 | End: 2024-03-19 | Stop reason: HOSPADM

## 2024-03-19 RX ORDER — OXYCODONE HYDROCHLORIDE 5 MG/1
5 TABLET ORAL
Status: DISCONTINUED | OUTPATIENT
Start: 2024-03-19 | End: 2024-03-19 | Stop reason: HOSPADM

## 2024-03-19 RX ORDER — FENTANYL CITRATE 50 UG/ML
INJECTION, SOLUTION INTRAMUSCULAR; INTRAVENOUS
Status: DISCONTINUED | OUTPATIENT
Start: 2024-03-19 | End: 2024-03-19

## 2024-03-19 RX ORDER — HYDROCODONE BITARTRATE AND ACETAMINOPHEN 5; 325 MG/1; MG/1
1 TABLET ORAL EVERY 8 HOURS PRN
Qty: 12 TABLET | Refills: 0
Start: 2024-03-19

## 2024-03-19 RX ORDER — FAMOTIDINE 10 MG/ML
INJECTION INTRAVENOUS
Status: DISCONTINUED | OUTPATIENT
Start: 2024-03-19 | End: 2024-03-19

## 2024-03-19 RX ORDER — ACETAMINOPHEN 10 MG/ML
INJECTION, SOLUTION INTRAVENOUS
Status: DISCONTINUED | OUTPATIENT
Start: 2024-03-19 | End: 2024-03-19

## 2024-03-19 RX ORDER — ROCURONIUM BROMIDE 10 MG/ML
INJECTION, SOLUTION INTRAVENOUS
Status: DISCONTINUED | OUTPATIENT
Start: 2024-03-19 | End: 2024-03-19

## 2024-03-19 RX ORDER — ACETAMINOPHEN 325 MG/1
650 TABLET ORAL EVERY 4 HOURS PRN
Status: CANCELLED | OUTPATIENT
Start: 2024-03-19

## 2024-03-19 RX ORDER — HYDROMORPHONE HYDROCHLORIDE 1 MG/ML
0.2 INJECTION, SOLUTION INTRAMUSCULAR; INTRAVENOUS; SUBCUTANEOUS EVERY 5 MIN PRN
Status: DISCONTINUED | OUTPATIENT
Start: 2024-03-19 | End: 2024-03-19 | Stop reason: HOSPADM

## 2024-03-19 RX ADMIN — PHENYLEPHRINE HYDROCHLORIDE 200 MCG: 10 INJECTION INTRAVENOUS at 07:03

## 2024-03-19 RX ADMIN — PHENYLEPHRINE HYDROCHLORIDE 100 MCG: 10 INJECTION INTRAVENOUS at 08:03

## 2024-03-19 RX ADMIN — FENTANYL CITRATE 100 MCG: 50 INJECTION, SOLUTION INTRAMUSCULAR; INTRAVENOUS at 07:03

## 2024-03-19 RX ADMIN — FAMOTIDINE 20 MG: 10 INJECTION, SOLUTION INTRAVENOUS at 07:03

## 2024-03-19 RX ADMIN — PROPOFOL 100 MG: 10 INJECTION, EMULSION INTRAVENOUS at 07:03

## 2024-03-19 RX ADMIN — LIDOCAINE HYDROCHLORIDE 100 MG: 10 INJECTION, SOLUTION INTRAVENOUS at 07:03

## 2024-03-19 RX ADMIN — ACETAMINOPHEN 1000 MG: 10 INJECTION, SOLUTION INTRAVENOUS at 07:03

## 2024-03-19 RX ADMIN — CEFAZOLIN 2 G: 330 INJECTION, POWDER, FOR SOLUTION INTRAMUSCULAR; INTRAVENOUS at 07:03

## 2024-03-19 RX ADMIN — SODIUM CHLORIDE, SODIUM LACTATE, POTASSIUM CHLORIDE, AND CALCIUM CHLORIDE: .6; .31; .03; .02 INJECTION, SOLUTION INTRAVENOUS at 07:03

## 2024-03-19 RX ADMIN — PHENYLEPHRINE HYDROCHLORIDE 100 MCG: 10 INJECTION INTRAVENOUS at 07:03

## 2024-03-19 RX ADMIN — SUGAMMADEX 200 MG: 100 INJECTION, SOLUTION INTRAVENOUS at 08:03

## 2024-03-19 RX ADMIN — DEXAMETHASONE SODIUM PHOSPHATE 8 MG: 4 INJECTION, SOLUTION INTRAMUSCULAR; INTRAVENOUS at 07:03

## 2024-03-19 RX ADMIN — ONDANSETRON 4 MG: 2 INJECTION INTRAMUSCULAR; INTRAVENOUS at 07:03

## 2024-03-19 RX ADMIN — GLYCOPYRROLATE 0.2 MG: 0.2 INJECTION, SOLUTION INTRAMUSCULAR; INTRAVITREAL at 07:03

## 2024-03-19 RX ADMIN — ROCURONIUM BROMIDE 40 MG: 10 INJECTION, SOLUTION INTRAVENOUS at 07:03

## 2024-03-19 NOTE — OP NOTE
Preop diagnosis right breast cancer  Postop diagnosis same  Procedures port placement  Patient was placed supine on operating table where satisfactory general anesthesia.  Clavicular areas lower neck upper chest were prepped and draped in a sterile fashion.  I placed a right parasternal incision developed a pocket for the port on top of the muscle.  It was sewn in with interrupted permanent sutures.  Stab incision was made below the lateral 3rd of the right clavicle the catheter was pulled out through this incision.  A needle was inserted into the subclavian vein the guidewire was threaded into superior vena cava.  This was confirmed with the C-arm.  The catheter was cut to the appropriate length.  Dilator and outer sheath were placed over the guidewire.  Dilator and guidewire were removed and catheter was fed in through the sheath leaving the tip in the proximal superior vena cava.  Port was aspirated found any functional well and flushed with heparinized saline.  Incisions were infiltrated with local anesthetic.  Wounds were closed with absorbable suture in layers.  Patient tolerated procedure well.

## 2024-03-19 NOTE — TRANSFER OF CARE
Anesthesia Transfer of Care Note    Patient: Dora Atwood    Procedure(s) Performed: Procedure(s) (LRB):  INSERTION, PORT-A-CATH (Right)    Patient location: PACU    Anesthesia Type: general    Transport from OR: Transported from OR on room air with adequate spontaneous ventilation    Post pain: adequate analgesia    Post assessment: no apparent anesthetic complications    Post vital signs: stable    Level of consciousness: awake    Nausea/Vomiting: no nausea/vomiting    Complications: none    Transfer of care protocol was followed    Last vitals: Visit Vitals  BP (!) 162/91 (BP Location: Left arm, Patient Position: Lying)   Pulse 68   Temp 36.5 °C (97.7 °F) (Oral)   Resp 16   SpO2 99%   Breastfeeding No

## 2024-03-19 NOTE — ANESTHESIA PROCEDURE NOTES
Intubation    Date/Time: 3/19/2024 7:47 AM    Performed by: Kirstin Mariscal CRNA  Authorized by: Duong Estrada MD    Intubation:     Induction:  Intravenous    Intubated:  Postinduction    Mask Ventilation:  Easy mask    Attempts:  1    Attempted By:  CRNA    Method of Intubation:  Direct    Blade:  Ladd 3    Laryngeal View Grade: Grade I - full view of cords      Difficult Airway Encountered?: No      Complications:  None    Airway Device:  Oral endotracheal tube    Airway Device Size:  7.5    Style/Cuff Inflation:  Cuffed (inflated to minimal occlusive pressure)    Tube secured:  21    Secured at:  The lips    Placement Verified By:  Capnometry    Complicating Factors:  None    Findings Post-Intubation:  BS equal bilateral and atraumatic/condition of teeth unchanged

## 2024-03-19 NOTE — ANESTHESIA POSTPROCEDURE EVALUATION
Anesthesia Post Evaluation    Patient: Dora Atwood    Procedure(s) Performed: Procedure(s) (LRB):  INSERTION, PORT-A-CATH (Right)    Final Anesthesia Type: general      Patient location during evaluation: PACU  Patient participation: Yes- Able to Participate  Level of consciousness: awake and alert  Post-procedure vital signs: reviewed and stable  Pain management: adequate  Airway patency: patent    PONV status at discharge: No PONV  Anesthetic complications: no      Cardiovascular status: blood pressure returned to baseline and stable  Respiratory status: unassisted and room air  Hydration status: euvolemic  Follow-up not needed.              Vitals Value Taken Time   /77 03/19/24 1050   Temp 36.6 °C (97.8 °F) 03/19/24 1050   Pulse 64 03/19/24 1050   Resp 16 03/19/24 1050   SpO2 97 % 03/19/24 1050         Event Time   Out of Recovery 10:19:14         Pain/Isaac Score: Isaac Score: 9 (3/19/2024 10:15 AM)

## 2024-03-19 NOTE — ANESTHESIA PREPROCEDURE EVALUATION
03/19/2024  Dora Atwood is a 74 y.o., female.    Patient Active Problem List   Diagnosis    Invasive ductal carcinoma of breast, female, right       Past Surgical History:   Procedure Laterality Date    CATARACT EXTRACTION Bilateral 01/2024    CHOLECYSTECTOMY  2022    TUBAL LIGATION  1981        Tobacco Use:  The patient  reports that she has never smoked. She has never used smokeless tobacco.     Results for orders placed or performed during the hospital encounter of 03/14/24   EKG 12-lead    Collection Time: 03/14/24  3:24 PM   Result Value Ref Range    QRS Duration 116 ms    [facility] QTC Calculation 468 ms    Narrative    Test Reason : Z01.818,    Vent. Rate : 076 BPM     Atrial Rate : 076 BPM     P-R Int : 148 ms          QRS Dur : 116 ms      QT Int : 416 ms       P-R-T Axes : 067 -05 125 degrees     QTc Int : 468 ms    Normal sinus rhythm  Possible Anterior infarct ,age undetermined  ST and T wave abnormality, consider lateral ischemia  Abnormal ECG  No previous ECGs available    Referred By:             Confirmed By:              Lab Results   Component Value Date    WBC 6.36 03/14/2024    HGB 11.9 (L) 03/14/2024    HCT 37.4 03/14/2024    MCV 99 (H) 03/14/2024     03/14/2024     BMP  Lab Results   Component Value Date     03/14/2024    K 3.8 03/14/2024     03/14/2024    CO2 32 (H) 03/14/2024    BUN 16 03/14/2024    CREATININE 0.7 03/15/2024    CREATININE 0.7 03/15/2024    CALCIUM 9.0 03/14/2024    ANIONGAP 5 (L) 03/14/2024     (H) 03/14/2024       Results for orders placed in visit on 03/08/24    Echo    Interpretation Summary    Left Ventricle: The left ventricle is normal in size. Mildly increased wall thickness. There is mild concentric hypertrophy. There is normal systolic function. There is normal diastolic function.    Right Ventricle: Normal right ventricular cavity size.  Wall thickness is normal. Right ventricle wall motion  is normal. Systolic function is normal.    Tricuspid Valve: There is mild regurgitation with a centrally directed jet.    IVC/SVC: Intermediate venous pressure at 8 mmHg.            Pre-op Assessment    I have reviewed the Patient Summary Reports.     I have reviewed the Nursing Notes. I have reviewed the NPO Status.   I have reviewed the Medications.     Review of Systems  Anesthesia Hx:  No problems with previous Anesthesia             Denies Family Hx of Anesthesia complications.    Denies Personal Hx of Anesthesia complications.                    Social:  Non-Smoker       Hematology/Oncology:       -- Anemia:                  Current/Recent Cancer. (Right)  -- :      Oncology: right.               EENT/Dental:  EENT/Dental Normal           Cardiovascular:  Cardiovascular Normal                                            Pulmonary:  Pulmonary Normal                       Renal/:  Renal/ Normal                 Hepatic/GI:    Hiatal Hernia,              Musculoskeletal:  Musculoskeletal Normal                Neurological:  Neurology Normal                                      Endocrine:  Endocrine Normal            Psych:  Psychiatric Normal                    Physical Exam  General: Well nourished    Airway:  Mallampati: II   Mouth Opening: Normal  TM Distance: Normal  Tongue: Normal  Neck ROM: Normal ROM    Dental:  Intact    Chest/Lungs:  Clear to auscultation, Normal Respiratory Rate    Heart:  Rate: Normal  Rhythm: Regular Rhythm        Anesthesia Plan  Type of Anesthesia, risks & benefits discussed:    Anesthesia Type: Gen ETT  Intra-op Monitoring Plan: Standard ASA Monitors  Post Op Pain Control Plan: IV/PO Opioids PRN and multimodal analgesia  Induction:  IV  Airway Plan: Video  Informed Consent: Informed consent signed with the Patient and all parties understand the risks and agree with anesthesia plan.  All questions answered.   ASA Score:  2    Ready For Surgery From Anesthesia Perspective.     .

## 2024-03-19 NOTE — DISCHARGE INSTRUCTIONS
For 24 hours:  Do not shower  Do not sign any legal documents  Do not drink alcohol  No driving    Showers only, do not immerse yourself in any water ie. Bath tubs, lake water, swimming pools, etc. X 2 weeks.

## 2024-03-21 ENCOUNTER — HOSPITAL ENCOUNTER (OUTPATIENT)
Dept: RADIOLOGY | Facility: HOSPITAL | Age: 74
Discharge: HOME | End: 2024-03-21
Attending: INTERNAL MEDICINE
Payer: MEDICARE

## 2024-03-21 DIAGNOSIS — C50.911 INVASIVE DUCTAL CARCINOMA OF BREAST, FEMALE, RIGHT: ICD-10-CM

## 2024-03-21 PROCEDURE — 25500020 PHARM REV CODE 255: Mod: PO | Performed by: INTERNAL MEDICINE

## 2024-03-21 PROCEDURE — C8937 CAD BREAST MRI: HCPCS | Mod: TC,PO

## 2024-03-21 PROCEDURE — A9585 GADOBUTROL INJECTION: HCPCS | Mod: PO | Performed by: INTERNAL MEDICINE

## 2024-03-21 RX ORDER — GADOBUTROL 604.72 MG/ML
6.5 INJECTION INTRAVENOUS
Status: COMPLETED | OUTPATIENT
Start: 2024-03-21 | End: 2024-03-21

## 2024-03-21 RX ADMIN — GADOBUTROL 6.5 ML: 604.72 INJECTION INTRAVENOUS at 03:03

## 2024-03-25 ENCOUNTER — OFFICE VISIT (OUTPATIENT)
Dept: HEMATOLOGY/ONCOLOGY | Facility: CLINIC | Age: 74
End: 2024-03-25
Payer: MEDICARE

## 2024-03-25 ENCOUNTER — TELEPHONE (OUTPATIENT)
Dept: HEMATOLOGY/ONCOLOGY | Facility: CLINIC | Age: 74
End: 2024-03-25

## 2024-03-25 VITALS
BODY MASS INDEX: 23.86 KG/M2 | RESPIRATION RATE: 16 BRPM | DIASTOLIC BLOOD PRESSURE: 86 MMHG | WEIGHT: 143.38 LBS | TEMPERATURE: 96 F | SYSTOLIC BLOOD PRESSURE: 135 MMHG | HEART RATE: 90 BPM

## 2024-03-25 DIAGNOSIS — C50.911 INVASIVE DUCTAL CARCINOMA OF BREAST, FEMALE, RIGHT: Primary | ICD-10-CM

## 2024-03-25 PROCEDURE — 99215 OFFICE O/P EST HI 40 MIN: CPT | Mod: S$GLB,,, | Performed by: NURSE PRACTITIONER

## 2024-03-25 PROCEDURE — 3079F DIAST BP 80-89 MM HG: CPT | Mod: CPTII,S$GLB,, | Performed by: NURSE PRACTITIONER

## 2024-03-25 PROCEDURE — 3288F FALL RISK ASSESSMENT DOCD: CPT | Mod: CPTII,S$GLB,, | Performed by: NURSE PRACTITIONER

## 2024-03-25 PROCEDURE — 1101F PT FALLS ASSESS-DOCD LE1/YR: CPT | Mod: CPTII,S$GLB,, | Performed by: NURSE PRACTITIONER

## 2024-03-25 PROCEDURE — 3075F SYST BP GE 130 - 139MM HG: CPT | Mod: CPTII,S$GLB,, | Performed by: NURSE PRACTITIONER

## 2024-03-25 PROCEDURE — 1125F AMNT PAIN NOTED PAIN PRSNT: CPT | Mod: CPTII,S$GLB,, | Performed by: NURSE PRACTITIONER

## 2024-03-25 PROCEDURE — 1159F MED LIST DOCD IN RCRD: CPT | Mod: CPTII,S$GLB,, | Performed by: NURSE PRACTITIONER

## 2024-03-25 PROCEDURE — 3008F BODY MASS INDEX DOCD: CPT | Mod: CPTII,S$GLB,, | Performed by: NURSE PRACTITIONER

## 2024-03-25 RX ORDER — SODIUM CHLORIDE 0.9 % (FLUSH) 0.9 %
10 SYRINGE (ML) INJECTION
Status: CANCELLED | OUTPATIENT
Start: 2024-04-03

## 2024-03-25 RX ORDER — EPINEPHRINE 0.3 MG/.3ML
0.3 INJECTION SUBCUTANEOUS ONCE AS NEEDED
Status: CANCELLED | OUTPATIENT
Start: 2024-04-03

## 2024-03-25 RX ORDER — PROMETHAZINE HYDROCHLORIDE 25 MG/1
25 TABLET ORAL
Qty: 30 TABLET | Refills: 2 | Status: SHIPPED | OUTPATIENT
Start: 2024-03-25

## 2024-03-25 RX ORDER — HEPARIN 100 UNIT/ML
500 SYRINGE INTRAVENOUS
Status: CANCELLED | OUTPATIENT
Start: 2024-04-03

## 2024-03-25 RX ORDER — DEXAMETHASONE 4 MG/1
TABLET ORAL
Qty: 90 TABLET | Refills: 1 | Status: SHIPPED | OUTPATIENT
Start: 2024-03-25

## 2024-03-25 RX ORDER — PROCHLORPERAZINE EDISYLATE 5 MG/ML
5 INJECTION INTRAMUSCULAR; INTRAVENOUS ONCE AS NEEDED
Status: CANCELLED | OUTPATIENT
Start: 2024-04-03

## 2024-03-25 RX ORDER — ONDANSETRON HYDROCHLORIDE 8 MG/1
8 TABLET, FILM COATED ORAL EVERY 8 HOURS PRN
Qty: 30 TABLET | Refills: 2 | Status: SHIPPED | OUTPATIENT
Start: 2024-03-25 | End: 2025-03-25

## 2024-03-25 RX ORDER — DIPHENHYDRAMINE HYDROCHLORIDE 50 MG/ML
50 INJECTION INTRAMUSCULAR; INTRAVENOUS ONCE AS NEEDED
Status: CANCELLED | OUTPATIENT
Start: 2024-04-03

## 2024-03-25 RX ORDER — ONDANSETRON HYDROCHLORIDE 2 MG/ML
16 INJECTION, SOLUTION INTRAVENOUS
Status: CANCELLED | OUTPATIENT
Start: 2024-04-11

## 2024-03-25 NOTE — PROGRESS NOTES
[facility] Hematology Oncology Subsequent  encounter note    March 25, 2024    Subjective:      Patient ID:   Dora Atwood  74 y.o. female  1950  Waleska Plasencia, Javier Villanueva MD's      Chief Complaint:   R breast Cancer    HPI:  One hour spent discussing, examining, planning neoadj. Rx. Today.  74 y.o. female palpated a hard lump at the area of the tail of the right breast and the anterior right axilla.  The mass was hard, large, but movable and not fixed.  Dr. Meza, her PMD ordered a bilateral diagnostic mammogram and ultrasound.      She has inversion of the left and right nipple, she says this is a chronic finding and her breasts have always been with the inversion.  Pleomorphic microcalcifications extending from the upper central to the upper outer quadrant of the right breast are noted and cover an area of approximately 4 x 8 cm, these are new from prior studies.  The left breast shows no masses or microcalcifications.  There is a large mass at the right axilla measuring 4.5 cm in diameter.      Ultrasound of right breast and axilla show a 1 cm mass 3 cm from the nipple at the 9 o'clock position.  In the skin of the lateral and inferior right breast there are numerous small hypoechoic nodules on the order of 3-6 mm in size.  Ultrasound of the axilla shows a large mass with irregular borders at 3 x 4.8 cm.  Just lateral to this mass is a smaller mass at 3 x 2 cm.  The radiologist recommends biopsy of the nodule at 9:00 a.m. and the large right axillary mass presumed to be a metastatic lymph node.  He notes if the biopsy of the right breast nodule fails to demonstrate concordant histologic diagnosis, then stereotactic biopsy of the microcalcifications can be performed.    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  Focal ductal carcinoma insight 2, intermediate grade, solid pattern was  seen.  ERP was negative, PRP was negative, HER2 Lisa was 3+ positive    Biopsy the right axilla lymph node returned positive for metastatic carcinoma.  Residual lymphoid architecture is not seen.    She had onset of menses at age 14.  She is a  2 para 2 miscarriage 0 individual.  She delivered her 1st child at the maternal age of 25 and the 2nd child at the maternal age of 30.  She took birth control pills for approximately 10 years.  She had bilateral tubal ligation at age 30.  She went through the menopause in her mid 40s and she did take hormone replacement therapy for approximately 10 years.      Mom had rectal cancer, dad had diabetes and heart disease.  A paternal aunt had breast cancer, maternal uncle  in his 20s of some type of cancer.  A brother has heart disease.  Another brother  of an overdose, and a 3rd brother  of unknown cause.  She has a sister that she believes may have type of cancer and another sister with diabetes.  Her son and daughter are alive and well.  There is no family history known to her of ovarian, prostate, pancreas, or melanoma cancers.      CAT of head negative for metastatic dx.  ECHO call for Ej Fx  MRI of breast due next week.  PET uptake in mass, R supraclavicular LN, infraclavicular LN, internal mammary LN, R axillary LN, and mild increase in FGD  with skin thickening and edema.    Hgb 11.9., CMP NL.   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  Increase risk of breast and ovarian cancer.  Hereditary breast and ovarian cancer syndrome, with BrCa 2 mutation.  Increase risk of breast and pancreatic cancer with MIKY mutation.    Discussed bilateral mastectomy can decrease risk of new breast cancer to 3%.  Discussed prophylactic MARVIN/BSO can reduce risk of ovarian cancer here.  She will take this under consideration.    Orders placed for Carbo, Taxane, Herceptin, Perjeta for 4/3/24.  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 Mary.    ROS:    GEN: normal without any fever, night sweats or weight loss  HEENT: normal with no HA's, sore throat, stiff neck, changes in vision  CV: normal with no CP, SOB, PND, CARNEY or orthopnea  PULM: normal with no SOB, cough, hemoptysis, sputum or pleuritic pain  GI: normal with no abdominal pain, nausea, vomiting, constipation, diarrhea, melanotic stools, BRBPR, or hematemesis  : normal with no hematuria, dysuria  BREAST: See HPI  SKIN: See HPI     Hx BTL, cholecystectomy.    Review of patient's allergies indicates:  No Known Allergies    No meds.    Objective:   Vitals:  There were no vitals taken for this visit.    Physical Examination:   GEN: no apparent distress, comfortable  HEAD: atraumatic and normocephalic  EYES: no pallor, no icterus  ENT: no pharyngeal erythema, external ears WNL; no nasal discharge  NECK: no masses, thyroid normal, trachea midline, no LAD/LN's, supple  CV: RRR with no murmur; normal pulse  CHEST: Normal respiratory effort; CTAB; normal breath sounds; no wheeze or crackles  ABDOM: nontender and nondistended; soft; normal bowel sounds; no rebound/guarding  MUSC/Skeletal: ROM normal; no crepitus; joints normal; no deformities or arthropathy  EXTREM: no clubbing, cyanosis, inflammation or swelling  SKIN: no rashes, lesions, ulcers, petechiae or subcutaneous nodules  : no cvat  NEURO: grossly intact; motor/sensory WNL; no tremors  PSYCH: normal mood, affect and behavior  LYMPH: normal cervical, supraclavicular, and groin LN's  BREASTS:  Left breast is nontender, without discharge, and without palpable mass, left axilla is without palpable mass or lymphadenopathy.  Right breast has edema noted as compared to the right.  She does not have peau de orange change, and skin is intact.  I do not palpate the 1 cm tumor at the 9 o'clock position of the right breast.  She has a golf ball sized mass at the tail of the breast and anterior axillary area that is hard and movable, not fixed.  I do not palpate  the 2nd lymph node in the right axilla as referenced on the radiology studies.    CBC, CMP, breast tumor markers will be ordered.  Echocardiogram for ejection fraction will be ordered.  MRI of the brain with contrast for staging will be done.  MRI of the left and right breast with contrast to check for a 2nd primary in the left breast, given her lobular features, will be done.  PET scan is being done for staging, mass effect seen in breast, regional lymphadenopathy in R supraclav, R infraclav, R internal mammary area and R axilla.      Assessment:   (1) 74 y.o. female abnormal mammogram and ultrasound with 4.8 cm mass noted at the anterior axilla and tell of the breast, also a large area of new micro calcifications are seen in the upper outer quadrant of the right breast, and a 1 cm nodule is noted at the 9 o'clock position of the right breast.    (2) biopsy of the 9:00 a.m. nodule and the anterior axillary mass returned showing invasive mixed ductal and lobular carcinoma, grade 2, ERP negative, PRP negative, HER2 Lisa 3+ positive.    (3) my impression at this time is that she is at least stage II.  Staging studies will include MRI with contrast of the brain, MRI with contrast of the left and right breast, PET scan will be done to check for metastatic disease.  CBC, CMP, breast tumor markers have been requested and echocardiogram for ejection fraction has been requested.    (4) because of the negative ERP and PRP studies, tamoxifen or Arimidex would not be expected to help adjuvantly in decreasing her long-term systemic recurrence risk.    (5) the strongly 3+ positive HER2 Lisa would support that neoadjuvant carboplatin, taxane, Herceptin, Perjeta q. 3 weeks times 4-6 cycle may achieve a CHRIS state at the breast and axillary area after treatment.    (6) she does have the large area microcalcifications at the right breast in addition to the 9:00 a.m. biopsy-proven mass at the right breast.  The initial opinion as  discussed with Dr. Villanueva, would be that she would require a right mastectomy and probably a right axillary node dissection, after the completion of neoadjuvant CTHP    (7) as discussed with Dr. Villanueva, she will probably need chest irradiation and right axillary radiation, after her definitive surgery.    (8) will forward a copy of this note to Dr. Treviño, she sees him on Wednesday.  Will ask Dr. Treviño to place a carisa catheter to facilitate systemic neoadjuvant treatment.    For now the PET scan is scheduled March 6 at 3:45 p.m..  Echocardiogram is scheduled at March 8th at 2:30 p.m..  MRI of brain is scheduled at March 8th at 3:00 p.m.  MRI of the breast is scheduled for March 21st at 3:15 p.m.  Iram is going to work on trying to get the MRI of the breast sooner.    (9) knowledge of her family history of cancer is incomplete.  A paternal aunt had breast cancer, a maternal uncle had cancer of unknown type, a sister gives a history of possible cancer.  I have requested BRCA 1 and BRCA 2 testing with an extended profile.    I am going to ask her to follow up with myself during the week of March 11th to review the study results.  I am trying to get her started on the neoadjuvant treatment towards the end of March 11th week after the port has been placed and the local site has healed sufficiently.    I have spent 1 hour interviewing and the patient and examining the patient and discussing my findings and recommendations as outlined above.  It has taken me approximately another hour to summarize the reports, discuss with Dr. Villanueva, schedule her studies with Iram and to place this note into [facility],  On the visit at the comprehensive clinic last time.    Today 3/15/24  call for Ej Fx, await MRI of breasts, port placement.    For CTHP neoadjuvant Rx q 3 weeks x's 4-6 cycles.  Begin 4/3/24.  Udenyca support.  See me 4/3/24.                        +

## 2024-03-25 NOTE — PROGRESS NOTES
[facility] HEMATOLGY ONCOLOGY     Subjective:       Patient ID: Dora Atwood is a 74 y.o. female presents today for chemotherapy education.      Chief Complaint: Breast Cancer     HPI    Th patient is here today to discuss her treatment with pertuzumab, trastuzumab, taxotere, and carboplatin.   She is here with her .     She did have port placed last week with Dr. Treviño.       Oncology History   Invasive ductal carcinoma of breast, female, right   3/4/2024 Initial Diagnosis    Invasive ductal carcinoma of breast, female, right     4/1/2024 -  Chemotherapy    Treatment Summary   Plan Name: OP BREAST TCHP (pertuzumab trastuzumab DOCEtaxel CARBOplatin) Q3W  Treatment Goal: Curative  Status: Active  Start Date: 4/1/2024 (Planned)  End Date: 3/3/2025 (Planned)  Provider: SIMONE Brooks MD  Chemotherapy: CARBOplatin (PARAPLATIN) 590 mg in sodium chloride 0.9% 309 mL chemo infusion, 590 mg, Intravenous, Clinic/HOD 1 time, 0 of 6 cycles  DOCEtaxel (TAXOTERE) 75 mg/m2 = 130 mg in sodium chloride 0.9% 256.5 mL chemo infusion, 75 mg/m2 = 130 mg, Intravenous, Clinic/HOD 1 time, 0 of 6 cycles  pertuzumab (PERJETA) 840 mg in sodium chloride 0.9% 278 mL infusion, 840 mg, Intravenous, Clinic/HOD 1 time, 0 of 17 cycles  trastuzumab-anns (KANJINTI) 520 mg in sodium chloride 0.9% 250 mL chemo infusion, 8 mg/kg = 520 mg, Intravenous, Clinic/HOD 1 time, 0 of 17 cycles         Past Medical History:   Diagnosis Date    Breast cancer 01/01/2024       Past Surgical History:   Procedure Laterality Date    CATARACT EXTRACTION Bilateral 01/2024    CHOLECYSTECTOMY  2022    INSERTION OF TUNNELED CENTRAL VENOUS CATHETER (CVC) WITH SUBCUTANEOUS PORT Right 3/19/2024    Procedure: INSERTION, PORT-A-CATH;  Surgeon: Waleska Treviño MD;  Location: [facility] OR;  Service: General;  Laterality: Right;    TUBAL LIGATION  1981       Social History     Socioeconomic History    Marital status:    Tobacco Use    Smoking status: Never    Smokeless  tobacco: Never   Substance and Sexual Activity    Alcohol use: Never    Drug use: Never     Social Determinants of Health     Financial Resource Strain: Low Risk  (3/21/2024)    Overall Financial Resource Strain (CARDIA)     Difficulty of Paying Living Expenses: Not hard at all   Food Insecurity: No Food Insecurity (3/21/2024)    Hunger Vital Sign     Worried About Running Out of Food in the Last Year: Never true     Ran Out of Food in the Last Year: Never true   Transportation Needs: No Transportation Needs (3/21/2024)    PRAPARE - Transportation     Lack of Transportation (Medical): No     Lack of Transportation (Non-Medical): No   Physical Activity: Sufficiently Active (3/21/2024)    Exercise Vital Sign     Days of Exercise per Week: 6 days     Minutes of Exercise per Session: 30 min   Stress: Stress Concern Present (3/21/2024)    [employer] - Occupational Stress Questionnaire     Feeling of Stress : To some extent   Social Connections: Unknown (3/21/2024)    Social Connection and Isolation Panel [NHANES]     Frequency of Communication with Friends and Family: Twice a week     Frequency of Social Gatherings with Friends and Family: Once a week     Active Member of Clubs or Organizations: Yes     Attends Club or Organization Meetings: More than 4 times per year     Marital Status:    Housing Stability: Low Risk  (3/21/2024)    Housing Stability Vital Sign     Unable to Pay for Housing in the Last Year: No     Number of Places Lived in the Last Year: 1     Unstable Housing in the Last Year: No       Family History   Problem Relation Age of Onset    Rectal cancer Mother     Breast cancer Maternal Aunt        Review of patient's allergies indicates:  No Known Allergies      Current Outpatient Medications:     ferrous gluconate (FERGON) 324 MG tablet, Take 324 mg by mouth daily with breakfast., Disp: , Rfl:     HYDROcodone-acetaminophen (NORCO) 5-325 mg per tablet, Take 1 tablet by  mouth every 6 (six) hours as needed for Pain., Disp: 30 tablet, Rfl: 0    HYDROcodone-acetaminophen (NORCO) 5-325 mg per tablet, Take 1 tablet by mouth every 8 (eight) hours as needed for Pain., Disp: 12 tablet, Rfl: 0    HYDROcodone-acetaminophen (NORCO) 5-325 mg per tablet, Take 1 tablet by mouth every 8 (eight) hours as needed for Pain., Disp: 12 tablet, Rfl: 0    magnesium 30 mg Tab, Take 1 tablet by mouth once daily., Disp: , Rfl:     multivitamin (THERAGRAN) per tablet, Take 1 tablet by mouth once daily., Disp: , Rfl:     pot bicarb/potassium cit/ca (POTASSIUM BICARBONATE ORAL), Take 1 tablet by mouth once daily. OTC, Disp: , Rfl:     dexAMETHasone (DECADRON) 4 MG Tab, Take 8mg the AM and PM the day before chemo and 2 days after chemo, Disp: 90 tablet, Rfl: 1    ondansetron (ZOFRAN) 8 MG tablet, Take 1 tablet (8 mg total) by mouth every 8 (eight) hours as needed for Nausea., Disp: 30 tablet, Rfl: 2    promethazine (PHENERGAN) 25 MG tablet, Take 1 tablet (25 mg total) by mouth every 4 to 6 hours as needed for Nausea., Disp: 30 tablet, Rfl: 2    All medications and past history have been reviewed.    Review of Systems   Constitutional:  Negative for appetite change and unexpected weight change.   HENT:  Negative for mouth sores.    Eyes:  Negative for visual disturbance.   Respiratory:  Negative for cough and shortness of breath.    Cardiovascular:  Negative for chest pain.   Gastrointestinal:  Negative for abdominal pain and diarrhea.   Genitourinary:  Negative for frequency.   Musculoskeletal:  Negative for back pain.   Skin:  Negative for rash.   Neurological:  Negative for headaches.   Hematological:  Positive for adenopathy.   Psychiatric/Behavioral:  The patient is not nervous/anxious.        Objective:        Physcial Examination  VITAL SIGNS:    Body surface area is 1.73 meters squared.   Pain Assessment  Vitals:    03/25/24 1301   BP: 135/86   Pulse: 90   Resp: 16   Temp: 96.1 °F (35.6 °C)   Weight: 65  kg (143 lb 6.4 oz)   PainSc:   7   PainLoc: Chest     Wt Readings from Last 5 Encounters:   03/25/24 65 kg (143 lb 6.4 oz)   03/15/24 65.1 kg (143 lb 8 oz)   03/14/24 65 kg (143 lb 4.8 oz)   03/08/24 64.1 kg (141 lb 5 oz)   03/04/24 64.1 kg (141 lb 4.8 oz)       Physical Exam  Constitutional:       Appearance: Normal appearance.   HENT:      Head: Normocephalic and atraumatic.      Mouth/Throat:      Mouth: Mucous membranes are moist.   Cardiovascular:      Rate and Rhythm: Normal rate and regular rhythm.      Pulses: Normal pulses.      Heart sounds: Normal heart sounds.   Pulmonary:      Effort: Pulmonary effort is normal. No respiratory distress.      Breath sounds: Normal breath sounds. No wheezing.   Abdominal:      General: There is no distension.      Palpations: Abdomen is soft. There is no mass.      Tenderness: There is no abdominal tenderness.   Musculoskeletal:         General: No swelling. Normal range of motion.      Right lower leg: No edema.      Left lower leg: No edema.   Skin:     General: Skin is warm and dry.      Capillary Refill: Capillary refill takes 2 to 3 seconds.      Findings: No bruising or rash.      Comments: Port on right chest wall      Neurological:      Mental Status: She is alert and oriented to person, place, and time. Mental status is at baseline.      Motor: No weakness.   Psychiatric:         Mood and Affect: Mood normal.         Behavior: Behavior normal.              Laboratory and Radiology   Lab Results   Component Value Date    WBC 6.36 03/14/2024    RBC 3.79 (L) 03/14/2024    HGB 11.9 (L) 03/14/2024    HCT 37.4 03/14/2024    MCV 99 (H) 03/14/2024    MCH 31.4 (H) 03/14/2024    MCHC 31.8 (L) 03/14/2024    RDW 13.1 03/14/2024     03/14/2024    MPV 8.8 (L) 03/14/2024     BMP  Lab Results   Component Value Date     03/14/2024    K 3.8 03/14/2024     03/14/2024    CO2 32 (H) 03/14/2024    BUN 16 03/14/2024    CREATININE 0.7 03/15/2024    CREATININE 0.7  "03/15/2024    CALCIUM 9.0 03/14/2024    ANIONGAP 5 (L) 03/14/2024     No results found for: "ALT", "AST", "GGT", "ALKPHOS", "BILITOT"  Results for orders placed or performed during the hospital encounter of 03/15/24 (from the past 2160 hour(s))   CT Head W Wo Contrast    Narrative    CMS MANDATED QUALITY DATA - CT RADIATION 436    All CT scans at this facility utilize dose modulation, iterative reconstruction, and/or weight based dosing when appropriate to reduce radiation dose to as low as reasonably achievable.    CT of the brain with and without contrast    HISTORY: Breast carcinoma.    The examination is performed before and following intravenous administration of 50 mL Omnipaque 350.    The brain parenchyma appears unremarkable. There are no findings of acute hemorrhage or infarction. There is no evidence of intra-axial mass or significant mass effect. There is no midline shift.    The ventricular system is appropriate in size and position. There are no extra-axial collections.    Calcified plaque formation is observed within the intracranial segments of the carotid and vertebral arteries    Following contrast administration, no pathologic enhancement is observed.    No osseous abnormality is identified. The visualized paranasal sinuses, mastoid air cells and middle ear cavities are appropriately aerated.    IMPRESSION:    No significant abnormality.    Electronically signed by:  Taylor Kenney MD  03/15/2024 01:45 PM CDT Workstation: [id number]     Results for orders placed or performed during the hospital encounter of 03/06/24 (from the past 2160 hour(s))   NM PET CT FDG Skull Base to Mid Thigh    Impression    1. FDG avid right breast nodule compatible with patient's history of breast carcinoma.  2. FDG avid right supraclavicular, right infraclavicular, right internal mammary chain and right axillary metastatic lymphadenopathy.  3. No FDG avid malignancy below the diaphragm      Electronically signed " by: Neo Lin MD  Date:    03/06/2024  Time:    16:37     No results found for this or any previous visit (from the past 2160 hour(s)).    Pathology  Pathology Results  (Last 10 years)      None            All lab results and imaging results have been reviewed and discussed with the patient.        TITLE: PLAN OF CARE FOR THE CHEMOTHERAPY PATIENT / TEACHING PROTOCOL    PURPOSE: To involve the patient / significant other in the plan of care and to provide teaching to the significant other & patient receiving chemotherapy.    LEVEL: Independent.    CONTENT: The Plan of Care for the chemotherapy patient is individualized and appropriate to the patients needs, strengths, limitations, & goals.  Education includes information regarding chemotherapy side effects, the treatment itself, and self-care  Activities.    GOAL / OUTCOME STANDARDS    PHYSIOLOGIC: The client will remain free or experience minimal side effects or toxicities throughout the chemotherapy treatment period.     PSYCHOLOGIC: The client/significant others will demonstrate positive coping mechanisms in relation to chemotherapy and its side effects.      COGINITIVE: The client/significant others will verbalize understanding of self-care measure to avoid/minimize side effects of the chemotherapy regimen.    EVALUATION / COMMENT KEY:    V = Audiovisual/Video  S = Successfully meets outcome  N = Needs further instruction  NA = Not applicable to the patient  P = Previous knowledge  U = Unable to comprehend  * = See progress notes      PLAN OF CARE  INFORMATION TO BE DELIVERED / NURSING INTERVENTIONS DATE EVALUATION   Assessment of client/caregiver,         knowledge of cancer diagnosis,         and chemotherapy as a treatment. 1a. Evaluate patient/caregiver learning ability    b. Plan teaching sessions with patient/caregiver according to needs and present anxiety level/ability to learn.    c. Provide Chemotherapy Education Packet,        Mouth Care Protocol,          Specific Patient Education Sheets. 03/25/2024 S   Individual chemotherapy treatment         plan. 2a. Review of Chemotherapy Education handout from [email or web address]            03/25/2024   S   Knowledge Deficit & Self-Management of general side effects common to all chemotherapy:  Nausea/Vomiting  b.   Diarrhea  Mouth Care  Dental care  Constipation  Hair Loss  Potential for infection  Fatigue   3a. Reinforce that the majority of side effects from chemotherapy are reversible and are  controlled both in the hospital and at home        (blood counts recover, hair grows back).   b.  Refer to the following for reinforcement of         information post-treatment:  Mouth Care Protocol.  Bowel Protocol for constipation or diarrhea.  3.  Drug Specific Chemotherapy Information Sheets for each medication patient receiving.    03/25/2024     S     PLAN OF CARE  INFORMATION TO BE DELIVERED / NURSING INTERVENTIONS DATE EVALUATION   h. Potential for bleeding         i. Potential anemia/fatigue         j. Potential sunburn         k. Birth control measures  l. Safety measures post treatment 4.  Chemotherapy Home Care Instruction  and Safety Information Sheet.  A. patient/caregivers to thoroughly cook shellfish (shrimp, crab, etc) to decrease the chance of infection.    B.  Use sunscreen and protective clothing while in the sun.   03/25/2024      Knowledge deficit & Self Management of Drug Specific  Side Effects.    BLADDER EFFECTS        (Hemorrhagic Cystitis)                  Preventable with adequate hydration; occurs 2-3 days or more post treatment.   1.  Instruct patient to:  a.   Void at least every 2 hours; increase intake.  b.   DO NOT hold urine; go when urge is felt.  c.    Empty bladder at bedtime and on         awakening.  d.   Observe for color changes (red to tea           colored), amount and frequency changes.  e.   Notify oncologist of any abnormalities           in urine or voiding or if you cannot                drink adequate fluids.   03/25/2024   S   b.   CHANGES IN URINE   COLOR:      1.   Instruct patient:  a.   Most evident in first 2-3 voidings after           administration.  Lasts less than 24 hours.  If urine is discolored 2 or more days post- treatment, notify oncologist.      03/25/2024 S   c.    KIDNEY EFFECTS           (Nephrotoxicity)   1.  Instruct patient to:  a.   Drink 8-16 glasses of fluid/day the day   pre-treatment and 3-4 days post-treatment to maintain hydration; the best way to minimize kidney problems.  b.   Notify oncologist immediately if unable to drink fluids or if changes are noted in urinary elimination.     03/25/2024   S   PULMONARY TOXICITY    Instruct patient to report symptoms such as shortness of breath, chest pain, shallow breathing, or chest wall discomfort to physician.  Reinforce preventative measures used by the health care team.  Baseline and periodic PFT and chest x-ray.   03/25/2024   S     PLAN OF CARE INFORMATION TO BE DELIVERED / NURSING INTERVENTIONS DATE EVALUATION   NERVE & MUSCLE EFFECTS (neurotoxocity; neuropathy, possible visual/hearing changes)        Instruct patient to:    Report numbness or tingling of the hands/feet, loss of fine motor movement (buttoning shirt, tying shoelaces), or gait changes to your oncologist.  If numbness/tingling are present:  protect feet with shoes at all times.  Use gloves for washing dishes/gardening & potholders in kitchen.       03/25/2024   S   CARDIOTOXICITY  Decreased effectiveness of             cardiac function. Effective are                  cumulative and irreversible.                                    CARDIAC ARRYTHMIAS              4   Instruct:  Heart function may be tested before treatment and perdiocally during treatment.  Notify oncologist of irregular pulse, palpitations, shortness of breath, or swelling in lower extremities/feet.          Taxol and Taxotere can cause arrhythmias on infusion that resolve once infusion  discontinued. Instruct nurse if any irregularity felt.    03/25/2024   S   EXTRAVASTION  Occurs when vesicants leak outside of vein and cause damage to the skin and underlying tissues.   Reinforce preventive measures used to avoid complications.  Fresh IV site or central line monitored continuously with vesicant IVP.  Continuous infusion via central line site and blood return monitored periodically around the clock.  Instruct to:  Notify nurse of any discomfort, burning, stinging, etc. at IV site during chemotherapy administration.  Notify oncologist of any redness, pain, or swelling at IV site after discharge from hospital.   03/25/2024   S   HYPERSENSITIVITY can happen with any medication.   Instruct patient:  Nurse is with them during the initial part of treatment and will be close by to monitor.  Pre-medication ordered by the oncologist must be taken on time. If doses are missed, treatment will need to be re-scheduled.  Skin redness, itching, or hives appearing after discharge should be reported to oncologist. 03/25/2024   S       PLAN OF CARE INFORMATION TO BE DELIVERED / NURSING INTERVENTIONS DATE EVALUATION   FLU-LIKE SYNDROME      Instruct patient symptoms are hard to prevent and may include fever, shaking chills, muscle and body aches.  Taking prescribed medications from physician if needed.  Adequate fluids are important.    Reinforce the need to call if temperature is         elevated to 100.4 or more  03/25/2024   S   HAND-FOOT SYNDROME  causes painful, symmetric swelling and redness of palms and soles                  Instruct patient to report any numbness or tingling in the hands or feet.  Explain prevention techniques, such as     Use heavy moisturizers to lessen skin dryness and itching, but to avoid if skin is cracked or broken  Bathe in tepid water, use non-perfumed soap, and wash gently. Baths with oatmeal or diluted baking soda may be soothing.  Avoid tight fitting shoes and repetitive actions,  such as rubbing hands or applying pressure to hands/feet.  Review measures to take should syndrome occur:  Cold compresses and elevation for          edema  Pain medications and other measures as ordered by oncologist.   4.   Syndrome resolves few weeks after therapy. 03/25/2024   S   5. DISCHARGE PLANNING /        EDUCATION 1.    Explain importance of compliance with follow- up  tests (CBC, CMP).  2.    Verify patient/caregiver know:  a.    Oncologists office phone number.  b.    Dates of follow-up appointments.  c.    Prescriptions given for nausea  3.   Review side effects to monitor and notify          oncologist about.  4.   Reinforce the need for patient and caregivers to:  a.    Review information given.  b.    Call oncologists office with questions  or symptoms  5.   Provide [employer]chure make referrals if needed for financial or .   03/25/2024   S     PROGRESS NOTES: I met with the patient Mrs. Atwood today for chemotherapy education. she will be starting treatment with pertuzumab, trastuzumab, taxotere, carboplatin . We discussed the mechanism of action, potential side effects of this treatment as well as ways she can manage them at home. Some of these side effects include but or not limited to fever, nausea, vomiting, decreased appetite, fatigue, weakness, cytopenias, myalgia/arthralgia, constipation, diarrhea, bleeding, headache, shortness of breath, nail changes, taste change, hair thinning/loss, mood disturbances, or edema. We also discussed dietary modifications she should make although this will be discussed in more detail with the dietician. she was provided with anti-emetic medication, a copy of all of the information we discussed today as well as our contact information. she will be provided a schedule on his first day of treatment. We will obtain labs on a weekly basis and the patient will follow-up with the physician for toxicity monitoring throughout  treatment. All questions were answered and an informed consent was obtained. she was reminded to certainly contact us sooner if needed.  Attached to the patients folder and discussed with the patient the 24 hour/ 7 days a week after hours telephone number for the physician.  Patient notified to call anytime 24/7 because their is a physician on call for any problems that may arise.  Patient also notified to report to [facility] if they can not get in touch with a physician after hours.  Discussed the five wishes booklet with the patient and their family.           Assessment/Plan:       1. Invasive ductal carcinoma of breast, female, right      Invasive ductal carcinoma of breast, female, right  -     ondansetron (ZOFRAN) 8 MG tablet; Take 1 tablet (8 mg total) by mouth every 8 (eight) hours as needed for Nausea.  Dispense: 30 tablet; Refill: 2  -     promethazine (PHENERGAN) 25 MG tablet; Take 1 tablet (25 mg total) by mouth every 4 to 6 hours as needed for Nausea.  Dispense: 30 tablet; Refill: 2  -     Echo; Standing  -     dexAMETHasone (DECADRON) 4 MG Tab; Take 8mg the AM and PM the day before chemo and 2 days after chemo  Dispense: 90 tablet; Refill: 1          I have explained and the patient understands all of  the current recommendation(s). I have answered all of their questions to the best of my ability and to their complete satisfaction.   The patient is to continue with the current management plan.          Medications Ordered:  Zofran 8mg 1 tab PO Q8h prn nausea  Phenergan 25mg PO Q4-6h prn nausea    Standing Labs Ordered:  CBC weekly  CMP weekly        Total Face to Face Time: 60 minutes face to face with the patient and their family discussing the chemotherapy side-effects and when to call our office.   Electronically signed by: Electronically signed by: Isabell Gayle, MSN, APRN, AGNP-C

## 2024-03-26 ENCOUNTER — TELEPHONE (OUTPATIENT)
Dept: HEMATOLOGY/ONCOLOGY | Facility: CLINIC | Age: 74
End: 2024-03-26

## 2024-03-26 NOTE — TELEPHONE ENCOUNTER
Spoke with Dr Hemanth Abdullahi at [facility] [phone] ([phone]) to see about results pending on most recent MRI of pts breast. Dr Abdullahi stated that they were waiting on outside reports to come in to Care Everywhere before they could read pts MRI. Advised I would call [place] to see about reports.    Called [facility] at [place] [phone] and was informed that all reports on pt were sent to Care Everywhere all ready. Also that there are no MRI reports seen on this patient. Advised to call [facility] to see if anything available.    Called [facility] [phone] and medical records said if there are any reports to fax a request to [phone]. Request faxed.

## 2024-03-28 ENCOUNTER — TELEPHONE (OUTPATIENT)
Dept: HEMATOLOGY/ONCOLOGY | Facility: CLINIC | Age: 74
End: 2024-03-28

## 2024-03-28 DIAGNOSIS — C50.911 INVASIVE DUCTAL CARCINOMA OF BREAST, FEMALE, RIGHT: ICD-10-CM

## 2024-03-31 LAB
OHS QRS DURATION: 116 MS
OHS QTC CALCULATION: 468 MS

## 2024-04-03 ENCOUNTER — SOCIAL WORK (OUTPATIENT)
Dept: HEMATOLOGY/ONCOLOGY | Facility: CLINIC | Age: 74
End: 2024-04-03

## 2024-04-03 ENCOUNTER — INFUSION (OUTPATIENT)
Dept: INFUSION THERAPY | Facility: HOSPITAL | Age: 74
End: 2024-04-03
Attending: INTERNAL MEDICINE
Payer: MEDICARE

## 2024-04-03 ENCOUNTER — OFFICE VISIT (OUTPATIENT)
Dept: HEMATOLOGY/ONCOLOGY | Facility: CLINIC | Age: 74
End: 2024-04-03
Payer: MEDICARE

## 2024-04-03 ENCOUNTER — DOCUMENTATION ONLY (OUTPATIENT)
Dept: HEMATOLOGY/ONCOLOGY | Facility: CLINIC | Age: 74
End: 2024-04-03

## 2024-04-03 ENCOUNTER — TELEPHONE (OUTPATIENT)
Dept: HEMATOLOGY/ONCOLOGY | Facility: CLINIC | Age: 74
End: 2024-04-03

## 2024-04-03 VITALS
OXYGEN SATURATION: 99 % | TEMPERATURE: 98 F | RESPIRATION RATE: 16 BRPM | DIASTOLIC BLOOD PRESSURE: 78 MMHG | HEART RATE: 83 BPM | SYSTOLIC BLOOD PRESSURE: 137 MMHG | HEIGHT: 65 IN | BODY MASS INDEX: 24.1 KG/M2 | WEIGHT: 144.63 LBS

## 2024-04-03 VITALS
HEART RATE: 83 BPM | HEIGHT: 65 IN | OXYGEN SATURATION: 96 % | WEIGHT: 144.63 LBS | BODY MASS INDEX: 24.1 KG/M2 | TEMPERATURE: 98 F | SYSTOLIC BLOOD PRESSURE: 123 MMHG | RESPIRATION RATE: 18 BRPM | DIASTOLIC BLOOD PRESSURE: 68 MMHG

## 2024-04-03 DIAGNOSIS — T45.1X5A CHEMOTHERAPY INDUCED NEUTROPENIA: ICD-10-CM

## 2024-04-03 DIAGNOSIS — D70.1 CHEMOTHERAPY INDUCED NEUTROPENIA: ICD-10-CM

## 2024-04-03 DIAGNOSIS — C50.911 INVASIVE DUCTAL CARCINOMA OF BREAST, FEMALE, RIGHT: Primary | ICD-10-CM

## 2024-04-03 PROCEDURE — 3008F BODY MASS INDEX DOCD: CPT | Mod: CPTII,S$GLB,, | Performed by: INTERNAL MEDICINE

## 2024-04-03 PROCEDURE — 96417 CHEMO IV INFUS EACH ADDL SEQ: CPT

## 2024-04-03 PROCEDURE — 25000003 PHARM REV CODE 250: Performed by: NURSE PRACTITIONER

## 2024-04-03 PROCEDURE — A4216 STERILE WATER/SALINE, 10 ML: HCPCS | Performed by: INTERNAL MEDICINE

## 2024-04-03 PROCEDURE — 99214 OFFICE O/P EST MOD 30 MIN: CPT | Mod: S$GLB,,, | Performed by: INTERNAL MEDICINE

## 2024-04-03 PROCEDURE — 3075F SYST BP GE 130 - 139MM HG: CPT | Mod: CPTII,S$GLB,, | Performed by: INTERNAL MEDICINE

## 2024-04-03 PROCEDURE — 63600175 PHARM REV CODE 636 W HCPCS: Mod: JG | Performed by: INTERNAL MEDICINE

## 2024-04-03 PROCEDURE — 96413 CHEMO IV INFUSION 1 HR: CPT

## 2024-04-03 PROCEDURE — 1126F AMNT PAIN NOTED NONE PRSNT: CPT | Mod: CPTII,S$GLB,, | Performed by: INTERNAL MEDICINE

## 2024-04-03 PROCEDURE — 3078F DIAST BP <80 MM HG: CPT | Mod: CPTII,S$GLB,, | Performed by: INTERNAL MEDICINE

## 2024-04-03 PROCEDURE — 96375 TX/PRO/DX INJ NEW DRUG ADDON: CPT

## 2024-04-03 PROCEDURE — 25000003 PHARM REV CODE 250: Performed by: INTERNAL MEDICINE

## 2024-04-03 PROCEDURE — 96367 TX/PROPH/DG ADDL SEQ IV INF: CPT

## 2024-04-03 RX ORDER — PROCHLORPERAZINE EDISYLATE 5 MG/ML
5 INJECTION INTRAMUSCULAR; INTRAVENOUS ONCE AS NEEDED
Status: DISCONTINUED | OUTPATIENT
Start: 2024-04-03 | End: 2024-04-03 | Stop reason: HOSPADM

## 2024-04-03 RX ORDER — SODIUM CHLORIDE 0.9 % (FLUSH) 0.9 %
10 SYRINGE (ML) INJECTION
Status: CANCELLED
Start: 2024-04-04

## 2024-04-03 RX ORDER — MEPERIDINE HYDROCHLORIDE 25 MG/ML
25 INJECTION INTRAMUSCULAR; INTRAVENOUS; SUBCUTANEOUS ONCE AS NEEDED
Status: DISCONTINUED | OUTPATIENT
Start: 2024-04-03 | End: 2024-04-03 | Stop reason: HOSPADM

## 2024-04-03 RX ORDER — FAMOTIDINE 10 MG/ML
20 INJECTION INTRAVENOUS
Status: COMPLETED | OUTPATIENT
Start: 2024-04-03 | End: 2024-04-03

## 2024-04-03 RX ORDER — HEPARIN 100 UNIT/ML
500 SYRINGE INTRAVENOUS
Status: DISCONTINUED | OUTPATIENT
Start: 2024-04-03 | End: 2024-04-03 | Stop reason: HOSPADM

## 2024-04-03 RX ORDER — EPINEPHRINE 0.3 MG/.3ML
0.3 INJECTION SUBCUTANEOUS ONCE AS NEEDED
Status: DISCONTINUED | OUTPATIENT
Start: 2024-04-03 | End: 2024-04-03 | Stop reason: HOSPADM

## 2024-04-03 RX ORDER — DIPHENHYDRAMINE HYDROCHLORIDE 50 MG/ML
50 INJECTION INTRAMUSCULAR; INTRAVENOUS ONCE AS NEEDED
Status: DISCONTINUED | OUTPATIENT
Start: 2024-04-03 | End: 2024-04-03 | Stop reason: HOSPADM

## 2024-04-03 RX ORDER — SODIUM CHLORIDE 0.9 % (FLUSH) 0.9 %
10 SYRINGE (ML) INJECTION
Status: DISCONTINUED | OUTPATIENT
Start: 2024-04-03 | End: 2024-04-03 | Stop reason: HOSPADM

## 2024-04-03 RX ORDER — HEPARIN 100 UNIT/ML
500 SYRINGE INTRAVENOUS
Status: CANCELLED
Start: 2024-04-04

## 2024-04-03 RX ADMIN — TRASTUZUMAB-ANNS 520 MG: 420 INJECTION, POWDER, LYOPHILIZED, FOR SOLUTION INTRAVENOUS at 10:04

## 2024-04-03 RX ADMIN — FAMOTIDINE 20 MG: 10 INJECTION INTRAVENOUS at 03:04

## 2024-04-03 RX ADMIN — SODIUM CHLORIDE 130 MG: 9 INJECTION, SOLUTION INTRAVENOUS at 01:04

## 2024-04-03 RX ADMIN — CARBOPLATIN 590 MG: 10 INJECTION, SOLUTION INTRAVENOUS at 02:04

## 2024-04-03 RX ADMIN — APREPITANT 130 MG: 130 INJECTION, EMULSION INTRAVENOUS at 12:04

## 2024-04-03 RX ADMIN — SODIUM CHLORIDE: 9 INJECTION, SOLUTION INTRAVENOUS at 08:04

## 2024-04-03 RX ADMIN — DEXAMETHASONE SODIUM PHOSPHATE 12 MG: 4 INJECTION, SOLUTION INTRA-ARTICULAR; INTRALESIONAL; INTRAMUSCULAR; INTRAVENOUS; SOFT TISSUE at 12:04

## 2024-04-03 RX ADMIN — PERTUZUMAB 840 MG: 30 INJECTION, SOLUTION, CONCENTRATE INTRAVENOUS at 08:04

## 2024-04-03 RX ADMIN — SODIUM CHLORIDE, PRESERVATIVE FREE 10 ML: 5 INJECTION INTRAVENOUS at 04:04

## 2024-04-03 RX ADMIN — HEPARIN 500 UNITS: 100 SYRINGE at 04:04

## 2024-04-03 RX ADMIN — ONDANSETRON 16 MG: 2 INJECTION INTRAMUSCULAR; INTRAVENOUS at 12:04

## 2024-04-03 NOTE — PROGRESS NOTES
Dora Atwood is a 74 year old diagnosed with breast cancer.  I met with patient to complete new patient orientation and to complete the NCCN Distress Screening; patient indicated a rating of 0.  Patient denied needing psychosocial support at this time.  I provided patient with the [facility] community events flyer and my contact information in the event supportive services arise in the future.

## 2024-04-03 NOTE — PROGRESS NOTES
CHEMO SCHOOL- NUTRITION    Dora Atwood is a 74 y.o. female with breast cancer. Pt will be receiving TCHP. I met with Mrs. Atwood for chemo school today. Pt reports excellent appetite and intake. She has managed to gain some weight before starting treatment. She is working on increasing her hydration via water and decaf tea. She is not currently taking any herbal or antioxidant supplement. She denies having any nutrition related questions or concerns at the current time.     CW: 144#.     Food Insecurity:  Patient is worried whether their food would run out before they have more money to buy more: Never  The food they bought just didn't last and they didn't have money to buy more: Never      Plan:   Reviewed chemo school packet & provided copy to pt.   Discussed importance of maintaining wt & staying hydrated.   Reviewed food safety guidelines recommended during treatment.   Reviewed alternate sources of hydration  Recommended baking soda and salt mouth wash after eating  Provided RD contact info & encouraged pt to call with any questions/concerns.   Will f/u as needed.       Electronically signed by: Emmanuelle Jaeger MBA, RDN, LDN

## 2024-04-04 ENCOUNTER — INFUSION (OUTPATIENT)
Dept: INFUSION THERAPY | Facility: HOSPITAL | Age: 74
End: 2024-04-04
Attending: INTERNAL MEDICINE
Payer: MEDICARE

## 2024-04-04 VITALS
SYSTOLIC BLOOD PRESSURE: 137 MMHG | HEIGHT: 65 IN | TEMPERATURE: 98 F | DIASTOLIC BLOOD PRESSURE: 80 MMHG | RESPIRATION RATE: 18 BRPM | BODY MASS INDEX: 25.21 KG/M2 | WEIGHT: 151.31 LBS | HEART RATE: 82 BPM | OXYGEN SATURATION: 100 %

## 2024-04-04 DIAGNOSIS — D70.1 CHEMOTHERAPY INDUCED NEUTROPENIA: ICD-10-CM

## 2024-04-04 DIAGNOSIS — C50.911 INVASIVE DUCTAL CARCINOMA OF BREAST, FEMALE, RIGHT: Primary | ICD-10-CM

## 2024-04-04 DIAGNOSIS — T45.1X5A CHEMOTHERAPY INDUCED NEUTROPENIA: ICD-10-CM

## 2024-04-04 PROCEDURE — A4216 STERILE WATER/SALINE, 10 ML: HCPCS | Performed by: INTERNAL MEDICINE

## 2024-04-04 PROCEDURE — 25000003 PHARM REV CODE 250: Performed by: INTERNAL MEDICINE

## 2024-04-04 PROCEDURE — 63600175 PHARM REV CODE 636 W HCPCS: Performed by: INTERNAL MEDICINE

## 2024-04-04 PROCEDURE — 96372 THER/PROPH/DIAG INJ SC/IM: CPT | Mod: 59

## 2024-04-04 PROCEDURE — 96365 THER/PROPH/DIAG IV INF INIT: CPT

## 2024-04-04 RX ORDER — SODIUM CHLORIDE 0.9 % (FLUSH) 0.9 %
10 SYRINGE (ML) INJECTION
Status: DISCONTINUED | OUTPATIENT
Start: 2024-04-04 | End: 2024-04-04 | Stop reason: HOSPADM

## 2024-04-04 RX ORDER — HEPARIN 100 UNIT/ML
500 SYRINGE INTRAVENOUS
Status: COMPLETED | OUTPATIENT
Start: 2024-04-04 | End: 2024-04-04

## 2024-04-04 RX ADMIN — SODIUM CHLORIDE, PRESERVATIVE FREE 10 ML: 5 INJECTION INTRAVENOUS at 01:04

## 2024-04-04 RX ADMIN — ONDANSETRON 16 MG: 2 INJECTION INTRAMUSCULAR; INTRAVENOUS at 01:04

## 2024-04-04 RX ADMIN — HEPARIN 500 UNITS: 100 SYRINGE at 01:04

## 2024-04-04 RX ADMIN — PEGFILGRASTIM 6 MG: 6 INJECTION SUBCUTANEOUS at 01:04

## 2024-04-04 NOTE — PROGRESS NOTES
[facility] Hematology Oncology Subsequent  encounter note    4/3/24    Subjective:      Patient ID:   Dora Atwood  74 y.o. female  1950  Waleska Plasencia, Javier Villanueva MD's      Chief Complaint:   R breast Cancer    HPI:  One hour spent discussing, examining, planning neoadj. Rx. Today.  74 y.o. female palpated a hard lump at the area of the tail of the right breast and the anterior right axilla.  The mass was hard, large, but movable and not fixed.  Dr. Meza, her PMD ordered a bilateral diagnostic mammogram and ultrasound.      She has inversion of the left and right nipple, she says this is a chronic finding and her breasts have always been with the inversion.  Pleomorphic microcalcifications extending from the upper central to the upper outer quadrant of the right breast are noted and cover an area of approximately 4 x 8 cm, these are new from prior studies.  The left breast shows no masses or microcalcifications.  There is a large mass at the right axilla measuring 4.5 cm in diameter.      Ultrasound of right breast and axilla show a 1 cm mass 3 cm from the nipple at the 9 o'clock position.  In the skin of the lateral and inferior right breast there are numerous small hypoechoic nodules on the order of 3-6 mm in size.  Ultrasound of the axilla shows a large mass with irregular borders at 3 x 4.8 cm.  Just lateral to this mass is a smaller mass at 3 x 2 cm.  The radiologist recommends biopsy of the nodule at 9:00 a.m. and the large right axillary mass presumed to be a metastatic lymph node.  He notes if the biopsy of the right breast nodule fails to demonstrate concordant histologic diagnosis, then stereotactic biopsy of the microcalcifications can be performed.    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  Focal ductal carcinoma insight 2, intermediate grade, solid pattern was seen.   ERP was negative, PRP was negative, HER2 Lisa was 3+ positive    Biopsy the right axilla lymph node returned positive for metastatic carcinoma.  Residual lymphoid architecture is not seen.    She had onset of menses at age 14.  She is a  2 para 2 miscarriage 0 individual.  She delivered her 1st child at the maternal age of 25 and the 2nd child at the maternal age of 30.  She took birth control pills for approximately 10 years.  She had bilateral tubal ligation at age 30.  She went through the menopause in her mid 40s and she did take hormone replacement therapy for approximately 10 years.      Mom had rectal cancer, dad had diabetes and heart disease.  A paternal aunt had breast cancer, maternal uncle  in his 20s of some type of cancer.  A brother has heart disease.  Another brother  of an overdose, and a 3rd brother  of unknown cause.  She has a sister that she believes may have type of cancer and another sister with diabetes.  Her son and daughter are alive and well.  There is no family history known to her of ovarian, prostate, pancreas, or melanoma cancers.      CAT of head negative for metastatic dx.  ECHO Ej Fx 65-68%  MRI of breast done 3/21/24.  I spoke with Radiology, they are awaiting mamm from , should have a report by 14 days, 24.  PET uptake in mass, R supraclavicular LN, infraclavicular LN, internal mammary LN, R axillary LN, and mild increase in FGD  with skin thickening and edema.    Hgb 11.9., CMP NL.  BrCa 1 & 2 pending.  For CXR, port placement 3/19/24.  Reviewed.      ROS:   GEN: normal without any fever, night sweats or weight loss  HEENT: normal with no HA's, sore throat, stiff neck, changes in vision  CV: normal with no CP, SOB, PND, CARNEY or orthopnea  PULM: normal with no SOB, cough, hemoptysis, sputum or pleuritic pain  GI: normal with no abdominal pain, nausea, vomiting, constipation, diarrhea, melanotic stools, BRBPR, or hematemesis  : normal with no hematuria,  "dysuria  BREAST: See HPI  SKIN: See HPI     Hx BTL, cholecystectomy.    Review of patient's allergies indicates:  No Known Allergies    No meds.    Objective:   Vitals:  Blood pressure 137/78, pulse 83, temperature 97.9 °F (36.6 °C), resp. rate 16, height 5' 5" (1.651 m), weight 65.6 kg (144 lb 9.6 oz), SpO2 99 %.    Physical Examination:   GEN: no apparent distress, comfortable  HEAD: atraumatic and normocephalic  EYES: no pallor, no icterus  ENT: no pharyngeal erythema, external ears WNL; no nasal discharge  NECK: no masses, thyroid normal, trachea midline, no LAD/LN's, supple  CV: RRR with no murmur; normal pulse  CHEST: Normal respiratory effort; CTAB; normal breath sounds; no wheeze or crackles  ABDOM: nontender and nondistended; soft; normal bowel sounds; no rebound/guarding  MUSC/Skeletal: ROM normal; no crepitus; joints normal; no deformities or arthropathy  EXTREM: no clubbing, cyanosis, inflammation or swelling  SKIN: no rashes, lesions, ulcers, petechiae or subcutaneous nodules  : no cvat  NEURO: grossly intact; motor/sensory WNL; no tremors  PSYCH: normal mood, affect and behavior  LYMPH: normal cervical, supraclavicular, and groin LN's  BREASTS:  Left breast is nontender, without discharge, and without palpable mass, left axilla is without palpable mass or lymphadenopathy.  Right breast has edema noted as compared to the right.  She does not have peau de orange change, and skin is intact.  I do not palpate the 1 cm tumor at the 9 o'clock position of the right breast.  She has a golf ball sized mass at the tail of the breast and anterior axillary area that is hard and movable, not fixed.  I do not palpate the 2nd lymph node in the right axilla as referenced on the radiology studies.    CBC, CMP, breast tumor markers will be ordered.  Echocardiogram for ejection fraction will be ordered.  MRI of the brain with contrast for staging will be done.  MRI of the left and right breast with contrast to check " for a 2nd primary in the left breast, given her lobular features, will be done.  PET scan is being done for staging, to check for metastatic disease?    Assessment:   (1) 74 y.o. female abnormal mammogram and ultrasound with 4.8 cm mass noted at the anterior axilla and tell of the breast, also a large area of new micro calcifications are seen in the upper outer quadrant of the right breast, and a 1 cm nodule is noted at the 9 o'clock position of the right breast.    (2) biopsy of the 9:00 a.m. nodule and the anterior axillary mass returned showing invasive mixed ductal and lobular carcinoma, grade 2, ERP negative, PRP negative, HER2 Lisa 3+ positive.    (3) my impression at this time is that she is at least stage II.  Staging studies will include MRI with contrast of the brain, MRI with contrast of the left and right breast, PET scan will be done to check for metastatic disease.  CBC, CMP, breast tumor markers have been requested and echocardiogram for ejection fraction has been requested.    (4) because of the negative ERP and PRP studies, tamoxifen or Arimidex would not be expected to help adjuvantly in decreasing her long-term systemic recurrence risk.    (5) the strongly 3+ positive HER2 Lisa would support that neoadjuvant carboplatin, taxane, Herceptin, Perjeta q. 3 weeks times 4-6 cycle may achieve a CHRIS state at the breast and axillary area after treatment.    (6) she does have the large area microcalcifications at the right breast in addition to the 9:00 a.m. biopsy-proven mass at the right breast.  The initial opinion as discussed with Dr. Villanueva, would be that she would require a right mastectomy and probably a right axillary node dissection, after the completion of neoadjuvant CTHP    (7) as discussed with Dr. Villanueva, she will probably need chest irradiation and right axillary radiation, after her definitive surgery.    (8) will forward a copy of this note to Dr. Treviño, she sees him on Wednesday.  Will ask  Dr. Treviño to place a carisa catheter to facilitate systemic neoadjuvant treatment.    For now the PET scan is scheduled March 6 at 3:45 p.m..  Echocardiogram is scheduled at March 8th at 2:30 p.m..  MRI of brain is scheduled at March 8th at 3:00 p.m.  MRI of the breast is scheduled for March 21st at 3:15 p.m.  Iram is going to work on trying to get the MRI of the breast sooner.    (9) knowledge of her family history of cancer is incomplete.  A paternal aunt had breast cancer, a maternal uncle had cancer of unknown type, a sister gives a history of possible cancer.  I have requested BRCA 1 and BRCA 2 testing with an extended profile.    I am going to ask her to follow up with myself during the week of March 11th to review the study results.  I am trying to get her started on the neoadjuvant treatment towards the end of March 11th week after the port has been placed and the local site has healed sufficiently.    I have spent 1 hour interviewing and the patient and examining the patient and discussing my findings and recommendations as outlined above.  It has taken me approximately another hour to summarize the reports, discuss with Dr. Villanueva, schedule her studies with Iram and to place this note into [employer],  On the visit at the comprehensive clinic last time.    Today 3/15/24  call for Ej Fx, await MRI of breasts, port placement.    CTHP neoadjuvant Rx q 3 weeks x's 4-6 cycles.  D1C1 4/3/24.  At the end of Carboplatin infusion, she c/o chest pain.sx, YOVANY Whyte saw her, medicated and sx resolved.  Suspected reaction to carboplatin, first infusion.    Option of giving additional premeds, prolonged carboplatin over 2 hours.  Or  Option of D/C carboplatin, and placing Cytoxan into the D1C2.  Discussed with pt, she RTC tomorrow for Zofran, Udenyca.    See YOVANY Whyte 2 weeks,  RTC see me D1C2.                          +

## 2024-04-04 NOTE — PLAN OF CARE
Problem: Fatigue  Goal: Improved Activity Tolerance  Outcome: Ongoing, Progressing  Intervention: Promote Improved Energy  Flowsheets (Taken 4/4/2024 1317)  Fatigue Management: frequent rest breaks encouraged

## 2024-04-08 ENCOUNTER — TELEPHONE (OUTPATIENT)
Dept: HEMATOLOGY/ONCOLOGY | Facility: CLINIC | Age: 74
End: 2024-04-08

## 2024-04-08 DIAGNOSIS — K52.1 CHEMOTHERAPY INDUCED DIARRHEA: Primary | ICD-10-CM

## 2024-04-08 DIAGNOSIS — T45.1X5A CHEMOTHERAPY INDUCED DIARRHEA: Primary | ICD-10-CM

## 2024-04-08 RX ORDER — DIPHENOXYLATE HYDROCHLORIDE AND ATROPINE SULFATE 2.5; .025 MG/1; MG/1
1 TABLET ORAL 4 TIMES DAILY PRN
Qty: 30 TABLET | Refills: 1 | Status: SHIPPED | OUTPATIENT
Start: 2024-04-08 | End: 2024-05-06 | Stop reason: SDUPTHER

## 2024-04-08 NOTE — TELEPHONE ENCOUNTER
Gave critical lab values WBC 2.0 and ANC 1500 to YOVANY Whyte. Tried calling patient to go over lab values but was only able to leave a vmail.   Pt needs to repeat her labs on Monday of next week. Also, we need to put pt on neutropenic precautions. Especially if pt spike a temp greater than 100.4 pt needs to go to the ER.. Will try to contact pt again.     Order placed patient will have to fast for 10 hours but he could drink plenty of water.

## 2024-04-08 NOTE — TELEPHONE ENCOUNTER
Spoke with patient and informed her that her WBC's are 2.0 and her ANC is 1500. Advised pt to make sure that she is safe around crowds, washes hands, wears a mask, and goes to the ER if she spikes a temp greater than 100.4. Pt verbalized understanding.    Pt states that she is having diarrhea and taking Immodium but it isn't helping. Advise that pt needs to be on Lomotil and that I will ask Dr Brooks/[facility] to see about prescribing it for her. Advised pt I would follow up. Pt verbalized understanding.

## 2024-04-10 ENCOUNTER — TELEPHONE (OUTPATIENT)
Dept: HEMATOLOGY/ONCOLOGY | Facility: CLINIC | Age: 74
End: 2024-04-10

## 2024-04-22 ENCOUNTER — OFFICE VISIT (OUTPATIENT)
Dept: HEMATOLOGY/ONCOLOGY | Facility: CLINIC | Age: 74
End: 2024-04-22
Payer: MEDICARE

## 2024-04-22 VITALS
WEIGHT: 141.5 LBS | TEMPERATURE: 97 F | BODY MASS INDEX: 23.57 KG/M2 | HEART RATE: 86 BPM | RESPIRATION RATE: 17 BRPM | HEIGHT: 65 IN | SYSTOLIC BLOOD PRESSURE: 132 MMHG | DIASTOLIC BLOOD PRESSURE: 73 MMHG

## 2024-04-22 DIAGNOSIS — T45.1X5A CHEMOTHERAPY INDUCED NEUTROPENIA: ICD-10-CM

## 2024-04-22 DIAGNOSIS — K13.79 MOUTH SORES: ICD-10-CM

## 2024-04-22 DIAGNOSIS — D70.1 CHEMOTHERAPY INDUCED NEUTROPENIA: ICD-10-CM

## 2024-04-22 DIAGNOSIS — C50.911 INVASIVE DUCTAL CARCINOMA OF BREAST, FEMALE, RIGHT: Primary | ICD-10-CM

## 2024-04-22 DIAGNOSIS — F41.9 ANXIETY AND DEPRESSION: ICD-10-CM

## 2024-04-22 DIAGNOSIS — F32.A ANXIETY AND DEPRESSION: ICD-10-CM

## 2024-04-22 PROCEDURE — 3008F BODY MASS INDEX DOCD: CPT | Mod: CPTII,S$GLB,, | Performed by: NURSE PRACTITIONER

## 2024-04-22 PROCEDURE — 1159F MED LIST DOCD IN RCRD: CPT | Mod: CPTII,S$GLB,, | Performed by: NURSE PRACTITIONER

## 2024-04-22 PROCEDURE — 1101F PT FALLS ASSESS-DOCD LE1/YR: CPT | Mod: CPTII,S$GLB,, | Performed by: NURSE PRACTITIONER

## 2024-04-22 PROCEDURE — 3075F SYST BP GE 130 - 139MM HG: CPT | Mod: CPTII,S$GLB,, | Performed by: NURSE PRACTITIONER

## 2024-04-22 PROCEDURE — 3288F FALL RISK ASSESSMENT DOCD: CPT | Mod: CPTII,S$GLB,, | Performed by: NURSE PRACTITIONER

## 2024-04-22 PROCEDURE — 99215 OFFICE O/P EST HI 40 MIN: CPT | Mod: S$GLB,,, | Performed by: NURSE PRACTITIONER

## 2024-04-22 PROCEDURE — 3078F DIAST BP <80 MM HG: CPT | Mod: CPTII,S$GLB,, | Performed by: NURSE PRACTITIONER

## 2024-04-22 PROCEDURE — 1126F AMNT PAIN NOTED NONE PRSNT: CPT | Mod: CPTII,S$GLB,, | Performed by: NURSE PRACTITIONER

## 2024-04-22 RX ORDER — ESCITALOPRAM OXALATE 5 MG/1
5 TABLET ORAL DAILY
Qty: 90 TABLET | Refills: 3 | Status: SHIPPED | OUTPATIENT
Start: 2024-04-22 | End: 2025-04-22

## 2024-04-22 NOTE — PROGRESS NOTES
[facility] HEMATOLGY ONCOLOGY CONSULTATION    Subjective:       Patient ID: Dora Atwood is a 74 y.o. female returning today for a regularly scheduled follow-up visit.    Chief Complaint: Breast Cancer       HPI  Mrs. Atwood is here today for a follow up visit. She is here by herself.   She is due for cycle 2 of Taxotre, Cytoxan (replacing carboplatin), Herceptin and perjeta this week.     Previous carboplatin reaction therefore Dr. Brooks switched it to cytoxan.  MRI of the breast reviewed with Dr. Brooks previously.     She does state that about a week after her infusion she did have extreme diarrhea, hair loss, nausea, and she voices that she is battling some anxiety and depression right now with her diagnoses.               Past Medical History:   Diagnosis Date    Breast cancer 01/01/2024       Past Surgical History:   Procedure Laterality Date    CATARACT EXTRACTION Bilateral 01/2024    CHOLECYSTECTOMY  2022    INSERTION OF TUNNELED CENTRAL VENOUS CATHETER (CVC) WITH SUBCUTANEOUS PORT Right 3/19/2024    Procedure: INSERTION, PORT-A-CATH;  Surgeon: Waleska Treviño MD;  Location: [facility];  Service: General;  Laterality: Right;    TUBAL LIGATION  1981       Social History     Socioeconomic History    Marital status:    Tobacco Use    Smoking status: Never    Smokeless tobacco: Never   Substance and Sexual Activity    Alcohol use: Never    Drug use: Never     Social Determinants of Health     Financial Resource Strain: Low Risk  (3/21/2024)    Overall Financial Resource Strain (CARDIA)     Difficulty of Paying Living Expenses: Not hard at all   Food Insecurity: No Food Insecurity (3/21/2024)    Hunger Vital Sign     Worried About Running Out of Food in the Last Year: Never true     Ran Out of Food in the Last Year: Never true   Transportation Needs: No Transportation Needs (3/21/2024)    PRAPARE - Transportation     Lack of Transportation (Medical): No     Lack of Transportation (Non-Medical): No   Physical  Activity: Sufficiently Active (3/21/2024)    Exercise Vital Sign     Days of Exercise per Week: 6 days     Minutes of Exercise per Session: 30 min   Stress: Stress Concern Present (3/21/2024)    [employer] [place] [employer] - Occupational Stress Questionnaire     Feeling of Stress : To some extent   Social Connections: Unknown (3/21/2024)    Social Connection and Isolation Panel [NHANES]     Frequency of Communication with Friends and Family: Twice a week     Frequency of Social Gatherings with Friends and Family: Once a week     Active Member of Clubs or Organizations: Yes     Attends Club or Organization Meetings: More than 4 times per year     Marital Status:    Housing Stability: Low Risk  (3/21/2024)    Housing Stability Vital Sign     Unable to Pay for Housing in the Last Year: No     Number of Places Lived in the Last Year: 1     Unstable Housing in the Last Year: No       Family History   Problem Relation Name Age of Onset    Rectal cancer Mother      Breast cancer Maternal Aunt         Review of patient's allergies indicates:   Allergen Reactions    Carboplatin Other (See Comments)     Chest tightness and flushing       Current Outpatient Medications:     dexAMETHasone (DECADRON) 4 MG Tab, Take 8mg the AM and PM the day before chemo and 2 days after chemo, Disp: 90 tablet, Rfl: 1    diphenoxylate-atropine 2.5-0.025 mg (LOMOTIL) 2.5-0.025 mg per tablet, Take 1 tablet by mouth 4 (four) times daily as needed for Diarrhea., Disp: 30 tablet, Rfl: 1    ferrous gluconate (FERGON) 324 MG tablet, Take 324 mg by mouth daily with breakfast., Disp: , Rfl:     HYDROcodone-acetaminophen (NORCO) 5-325 mg per tablet, Take 1 tablet by mouth every 8 (eight) hours as needed for Pain., Disp: 12 tablet, Rfl: 0    magnesium 30 mg Tab, Take 1 tablet by mouth once daily., Disp: , Rfl:     multivitamin (THERAGRAN) per tablet, Take 1 tablet by mouth once daily., Disp: , Rfl:     ondansetron (ZOFRAN) 8 MG tablet,  "Take 1 tablet (8 mg total) by mouth every 8 (eight) hours as needed for Nausea., Disp: 30 tablet, Rfl: 2    pot bicarb/potassium cit/ca (POTASSIUM BICARBONATE ORAL), Take 1 tablet by mouth once daily. OTC, Disp: , Rfl:     promethazine (PHENERGAN) 25 MG tablet, Take 1 tablet (25 mg total) by mouth every 4 to 6 hours as needed for Nausea., Disp: 30 tablet, Rfl: 2    duke's soln (benadryl 30 mL, mylanta 30 mL, LIDOcaine 30 mL, nystatin 30 mL) 120mL, Take 10 mLs by mouth 4 (four) times daily., Disp: 240 mL, Rfl: 1    EScitalopram oxalate (LEXAPRO) 5 MG Tab, Take 1 tablet (5 mg total) by mouth once daily., Disp: 90 tablet, Rfl: 3    HYDROcodone-acetaminophen (NORCO) 5-325 mg per tablet, Take 1 tablet by mouth every 6 (six) hours as needed for Pain., Disp: 30 tablet, Rfl: 0    HYDROcodone-acetaminophen (NORCO) 5-325 mg per tablet, Take 1 tablet by mouth every 8 (eight) hours as needed for Pain., Disp: 12 tablet, Rfl: 0    All medications and past history have been reviewed.    Review of Systems   Constitutional:  Positive for appetite change and unexpected weight change.   HENT:  Positive for mouth sores.    Eyes:  Negative for visual disturbance.   Respiratory:  Negative for cough and shortness of breath.    Cardiovascular:  Negative for chest pain.   Gastrointestinal:  Positive for diarrhea. Negative for abdominal pain.   Genitourinary:  Negative for frequency.   Musculoskeletal:  Positive for back pain.   Skin:  Negative for rash.   Neurological:  Negative for headaches.   Hematological:  Negative for adenopathy.   Psychiatric/Behavioral:  Positive for sleep disturbance. The patient is nervous/anxious.        Objective:        /73 (BP Location: Left arm)   Pulse 86   Temp 97 °F (36.1 °C)   Resp 17   Ht 5' 5" (1.651 m)   Wt 64.2 kg (141 lb 8 oz)   BMI 23.55 kg/m²     Physical Exam  Constitutional:       Appearance: Normal appearance.   HENT:      Head: Normocephalic and atraumatic.      Comments: Hair " loss       Mouth/Throat:      Mouth: Mucous membranes are moist.   Cardiovascular:      Rate and Rhythm: Normal rate and regular rhythm.      Pulses: Normal pulses.      Heart sounds: Normal heart sounds.   Pulmonary:      Effort: Pulmonary effort is normal. No respiratory distress.      Breath sounds: Normal breath sounds. No wheezing.   Chest:      Comments: Decreased size of mass and adenopathy in axilla     Abdominal:      General: There is no distension.      Palpations: Abdomen is soft. There is no mass.      Tenderness: There is no abdominal tenderness.   Musculoskeletal:         General: No swelling. Normal range of motion.      Right lower leg: No edema.      Left lower leg: No edema.   Skin:     General: Skin is warm and dry.      Capillary Refill: Capillary refill takes 2 to 3 seconds.      Findings: No bruising or rash.   Neurological:      Mental Status: She is alert and oriented to person, place, and time. Mental status is at baseline.      Motor: No weakness.   Psychiatric:         Mood and Affect: Mood is depressed. Affect is tearful.         Behavior: Behavior normal.           Lab:      136 - 145 mmol/L 139    Potassium 3.4 - 4.5 mmol/L 3.4   Chloride 98 - 107 mmol/L 105   CO2 21 - 31 mmol/L 29   BUN 7 - 25 mg/dL 13   Creatinine 0.60 - 1.30 mg/dL 0.61   Glucose 74 - 109 mg/dL 91   Calcium 8.6 - 10.3 mg/dL 8.6   Albumin 3.5 - 5.2 g/dL 3.5   AST 13 - 39 U/L 12 Low    ALT 7 - 52 U/L 12   Alkaline Phosphatase 34 - 104 U/L 99   Total Bilirubin 0.3 - 1.0 mg/dL 0.3   Protein Total 6.4 - 8.9 g/dL 6.1 Low    Anion Gap 3 - 15 mmol/L 5   Albumin/Globulin Ratio 1.0 - 2.0 1.3   Globulin 2.0 - 4.0 g/dL 2.6   Osmolality Calc 275 - 295 mOsmol/kg 277   eGFR >=60 mL/min 94         Ref Range & Units 7 d ago    WBC 4.8 - 10.8 iglesia/L 17.3 High    RBC 4.50 - 5.50 tril/L 3.37 Low    Hemoglobin 12.0 - 15.0 g/dL 10.6 Low    Hematocrit 37.0 - 47.0 % 31.4 Low    MCV 81 - 97 fL 93   MCH 27 - 32 pg 31   MCHC 32 - 36 g/dL 34    RDW 11.5 - 14.5 % 13.2   Platelets 150 - 400 iglesia/L 208   MPV 7.4 - 10.4 fL 6.9 Low    Granulocytes % 42.2 - 75.2 % 87.7 High    Lymphocytes % 20.5 - 51.1 % 7.3 Low    Monocytes % 1.7 - 9.3 % 4.6   Eosinophils % 0.0 - <5.0 % 0.1   Basophils % 0.0 - <5.0 % 0.3   Granulocytes Absolute 1.4 - 6.5 K/UL 15.2 High    Lymphocytes, Absolute 1.2 - 3.4 K/uL 1.3   Monocyte Absolute 0.11 - 0.59 K/uL 0.80 High    Eosinophils, Absolute 0.00 - 0.70 K/UL 0.00   Basophils, Absolute 0.00 - 0.20 K/UL 0.10   ANC 1400 - 6500 /uL 15,200 High    nRBC% 0 - 0 /100 0     Radiology/Diagnostic Studies:  Results for orders placed or performed during the hospital encounter of 03/06/24 (from the past 2160 hour(s))   NM PET CT FDG Skull Base to Mid Thigh    Impression    1. FDG avid right breast nodule compatible with patient's history of breast carcinoma.  2. FDG avid right supraclavicular, right infraclavicular, right internal mammary chain and right axillary metastatic lymphadenopathy.  3. No FDG avid malignancy below the diaphragm      Electronically signed by: Neo Lin MD  Date:    03/06/2024  Time:    16:37     Results for orders placed or performed during the hospital encounter of 03/15/24 (from the past 2160 hour(s))   CT Head W Wo Contrast    Narrative    CMS MANDATED QUALITY DATA - CT RADIATION 436    All CT scans at this facility utilize dose modulation, iterative reconstruction, and/or weight based dosing when appropriate to reduce radiation dose to as low as reasonably achievable.    CT of the brain with and without contrast    HISTORY: Breast carcinoma.    The examination is performed before and following intravenous administration of 50 mL Omnipaque 350.    The brain parenchyma appears unremarkable. There are no findings of acute hemorrhage or infarction. There is no evidence of intra-axial mass or significant mass effect. There is no midline shift.    The ventricular system is appropriate in size and position. There are no  extra-axial collections.    Calcified plaque formation is observed within the intracranial segments of the carotid and vertebral arteries    Following contrast administration, no pathologic enhancement is observed.    No osseous abnormality is identified. The visualized paranasal sinuses, mastoid air cells and middle ear cavities are appropriately aerated.    IMPRESSION:    No significant abnormality.    Electronically signed by:  Taylor Kenney MD  03/15/2024 01:45 PM CDT Workstation: [id number]         All lab results and imaging results have been reviewed and discussed with the patient.   Assessment/Plan:       1. Invasive ductal carcinoma of breast, female, right    2. Mouth sores    3. Chemotherapy induced neutropenia    4. Anxiety and depression      Invasive ductal carcinoma of breast, female, right    Mouth sores  -     duke's soln (benadryl 30 mL, mylanta 30 mL, LIDOcaine 30 mL, nystatin 30 mL) 120mL; Take 10 mLs by mouth 4 (four) times daily.  Dispense: 240 mL; Refill: 1    Chemotherapy induced neutropenia    Anxiety and depression  -     EScitalopram oxalate (LEXAPRO) 5 MG Tab; Take 1 tablet (5 mg total) by mouth once daily.  Dispense: 90 tablet; Refill: 3      PLAN:   Continue with Taxotere, Cytoxan, Herceptin and Perjeta - cycle 2 this week with neulasta on day 2   Lomotil for diarrhea   Encourage hydration   Labs weekly   Start lexapro daily   Dukes solution for mouth sores     RTC to see Dr. Brooks on 5/1/24    I have explained and the patient understands all of  the current recommendation(s). I have answered all of their questions to the best of my ability and to their complete satisfaction.   The patient is to continue with the current management plan.            Electronically signed by: Isabell Gayle, MSN, APRN, AGNP-C

## 2024-04-24 ENCOUNTER — INFUSION (OUTPATIENT)
Dept: INFUSION THERAPY | Facility: HOSPITAL | Age: 74
End: 2024-04-24
Attending: INTERNAL MEDICINE
Payer: MEDICARE

## 2024-04-24 VITALS
TEMPERATURE: 97 F | WEIGHT: 140.81 LBS | RESPIRATION RATE: 15 BRPM | OXYGEN SATURATION: 100 % | HEIGHT: 65 IN | HEART RATE: 78 BPM | SYSTOLIC BLOOD PRESSURE: 115 MMHG | DIASTOLIC BLOOD PRESSURE: 68 MMHG | BODY MASS INDEX: 23.46 KG/M2

## 2024-04-24 DIAGNOSIS — C50.911 INVASIVE DUCTAL CARCINOMA OF BREAST, FEMALE, RIGHT: Primary | ICD-10-CM

## 2024-04-24 DIAGNOSIS — K13.79 MOUTH SORES: ICD-10-CM

## 2024-04-24 DIAGNOSIS — D70.1 CHEMOTHERAPY INDUCED NEUTROPENIA: ICD-10-CM

## 2024-04-24 DIAGNOSIS — T45.1X5A CHEMOTHERAPY INDUCED NEUTROPENIA: ICD-10-CM

## 2024-04-24 PROCEDURE — 63600175 PHARM REV CODE 636 W HCPCS: Mod: JZ,JG | Performed by: INTERNAL MEDICINE

## 2024-04-24 PROCEDURE — A4216 STERILE WATER/SALINE, 10 ML: HCPCS | Performed by: INTERNAL MEDICINE

## 2024-04-24 PROCEDURE — 96413 CHEMO IV INFUSION 1 HR: CPT

## 2024-04-24 PROCEDURE — 96367 TX/PROPH/DG ADDL SEQ IV INF: CPT

## 2024-04-24 PROCEDURE — 96375 TX/PRO/DX INJ NEW DRUG ADDON: CPT

## 2024-04-24 PROCEDURE — 96417 CHEMO IV INFUS EACH ADDL SEQ: CPT

## 2024-04-24 PROCEDURE — 25000003 PHARM REV CODE 250: Performed by: INTERNAL MEDICINE

## 2024-04-24 RX ORDER — HEPARIN 100 UNIT/ML
500 SYRINGE INTRAVENOUS
Status: DISCONTINUED | OUTPATIENT
Start: 2024-04-24 | End: 2024-04-24 | Stop reason: HOSPADM

## 2024-04-24 RX ORDER — PROCHLORPERAZINE EDISYLATE 5 MG/ML
5 INJECTION INTRAMUSCULAR; INTRAVENOUS ONCE AS NEEDED
Status: DISCONTINUED | OUTPATIENT
Start: 2024-04-24 | End: 2024-04-24 | Stop reason: HOSPADM

## 2024-04-24 RX ORDER — EPINEPHRINE 0.3 MG/.3ML
0.3 INJECTION SUBCUTANEOUS ONCE AS NEEDED
Status: DISCONTINUED | OUTPATIENT
Start: 2024-04-24 | End: 2024-04-24 | Stop reason: HOSPADM

## 2024-04-24 RX ORDER — DIPHENHYDRAMINE HYDROCHLORIDE 50 MG/ML
50 INJECTION INTRAMUSCULAR; INTRAVENOUS ONCE AS NEEDED
Status: DISCONTINUED | OUTPATIENT
Start: 2024-04-24 | End: 2024-04-24 | Stop reason: HOSPADM

## 2024-04-24 RX ORDER — SODIUM CHLORIDE 0.9 % (FLUSH) 0.9 %
10 SYRINGE (ML) INJECTION
Status: DISCONTINUED | OUTPATIENT
Start: 2024-04-24 | End: 2024-04-24 | Stop reason: HOSPADM

## 2024-04-24 RX ORDER — MEPERIDINE HYDROCHLORIDE 25 MG/ML
25 INJECTION INTRAMUSCULAR; INTRAVENOUS; SUBCUTANEOUS ONCE AS NEEDED
Status: DISCONTINUED | OUTPATIENT
Start: 2024-04-24 | End: 2024-04-24 | Stop reason: HOSPADM

## 2024-04-24 RX ADMIN — SODIUM CHLORIDE: 9 INJECTION, SOLUTION INTRAVENOUS at 11:04

## 2024-04-24 RX ADMIN — APREPITANT 130 MG: 130 INJECTION, EMULSION INTRAVENOUS at 11:04

## 2024-04-24 RX ADMIN — TRASTUZUMAB-ANNS 390 MG: 420 INJECTION, POWDER, LYOPHILIZED, FOR SOLUTION INTRAVENOUS at 10:04

## 2024-04-24 RX ADMIN — CYCLOPHOSPHAMIDE 1040 MG: 2 INJECTION, POWDER, FOR SOLUTION INTRAVENOUS; ORAL at 11:04

## 2024-04-24 RX ADMIN — SODIUM CHLORIDE, PRESERVATIVE FREE 10 ML: 5 INJECTION INTRAVENOUS at 01:04

## 2024-04-24 RX ADMIN — SODIUM CHLORIDE 130 MG: 9 INJECTION, SOLUTION INTRAVENOUS at 12:04

## 2024-04-24 RX ADMIN — DEXAMETHASONE SODIUM PHOSPHATE 0.25 MG: 4 INJECTION, SOLUTION INTRA-ARTICULAR; INTRALESIONAL; INTRAMUSCULAR; INTRAVENOUS; SOFT TISSUE at 11:04

## 2024-04-24 RX ADMIN — HEPARIN 500 UNITS: 100 SYRINGE at 01:04

## 2024-04-24 RX ADMIN — PERTUZUMAB 420 MG: 30 INJECTION, SOLUTION, CONCENTRATE INTRAVENOUS at 09:04

## 2024-04-24 NOTE — PLAN OF CARE
Problem: Fatigue  Goal: Improved Activity Tolerance  Outcome: Progressing  Intervention: Promote Improved Energy  Flowsheets (Taken 4/24/2024 0004)  Fatigue Management:   fatigue-related activity identified   frequent rest breaks encouraged   paced activity encouraged  Sleep/Rest Enhancement:   relaxation techniques promoted   regular sleep/rest pattern promoted  Activity Management: Ambulated -L4  Environmental Support: rest periods encouraged

## 2024-04-25 ENCOUNTER — INFUSION (OUTPATIENT)
Dept: INFUSION THERAPY | Facility: HOSPITAL | Age: 74
End: 2024-04-25
Attending: INTERNAL MEDICINE
Payer: MEDICARE

## 2024-04-25 VITALS
WEIGHT: 145.19 LBS | HEIGHT: 65 IN | RESPIRATION RATE: 15 BRPM | TEMPERATURE: 97 F | SYSTOLIC BLOOD PRESSURE: 125 MMHG | HEART RATE: 82 BPM | BODY MASS INDEX: 24.19 KG/M2 | DIASTOLIC BLOOD PRESSURE: 74 MMHG | OXYGEN SATURATION: 100 %

## 2024-04-25 DIAGNOSIS — T45.1X5A CHEMOTHERAPY INDUCED NEUTROPENIA: ICD-10-CM

## 2024-04-25 DIAGNOSIS — D70.1 CHEMOTHERAPY INDUCED NEUTROPENIA: ICD-10-CM

## 2024-04-25 DIAGNOSIS — C50.911 INVASIVE DUCTAL CARCINOMA OF BREAST, FEMALE, RIGHT: Primary | ICD-10-CM

## 2024-04-25 PROCEDURE — 63600175 PHARM REV CODE 636 W HCPCS: Performed by: INTERNAL MEDICINE

## 2024-04-25 PROCEDURE — 96365 THER/PROPH/DIAG IV INF INIT: CPT

## 2024-04-25 PROCEDURE — 96372 THER/PROPH/DIAG INJ SC/IM: CPT | Mod: 59

## 2024-04-25 PROCEDURE — 25000003 PHARM REV CODE 250: Performed by: INTERNAL MEDICINE

## 2024-04-25 PROCEDURE — A4216 STERILE WATER/SALINE, 10 ML: HCPCS | Performed by: INTERNAL MEDICINE

## 2024-04-25 RX ORDER — HEPARIN 100 UNIT/ML
500 SYRINGE INTRAVENOUS
Status: COMPLETED | OUTPATIENT
Start: 2024-04-25 | End: 2024-04-25

## 2024-04-25 RX ORDER — SODIUM CHLORIDE 0.9 % (FLUSH) 0.9 %
10 SYRINGE (ML) INJECTION
Status: DISCONTINUED | OUTPATIENT
Start: 2024-04-25 | End: 2024-04-25 | Stop reason: HOSPADM

## 2024-04-25 RX ADMIN — ONDANSETRON HYDROCHLORIDE 16 MG: 2 INJECTION, SOLUTION INTRAMUSCULAR; INTRAVENOUS at 02:04

## 2024-04-25 RX ADMIN — PEGFILGRASTIM 6 MG: 6 INJECTION SUBCUTANEOUS at 02:04

## 2024-04-25 RX ADMIN — SODIUM CHLORIDE, PRESERVATIVE FREE 10 ML: 5 INJECTION INTRAVENOUS at 02:04

## 2024-04-25 RX ADMIN — HEPARIN 500 UNITS: 100 SYRINGE at 02:04

## 2024-04-25 NOTE — PLAN OF CARE
Problem: Fatigue  Goal: Improved Activity Tolerance  Outcome: Progressing  Intervention: Promote Improved Energy  Flowsheets (Taken 4/25/2024 2689)  Fatigue Management:   fatigue-related activity identified   frequent rest breaks encouraged   paced activity encouraged  Sleep/Rest Enhancement:   regular sleep/rest pattern promoted   relaxation techniques promoted  Activity Management: Ambulated -L4  Environmental Support: rest periods encouraged

## 2024-05-01 ENCOUNTER — TELEPHONE (OUTPATIENT)
Dept: HEMATOLOGY/ONCOLOGY | Facility: CLINIC | Age: 74
End: 2024-05-01

## 2024-05-01 ENCOUNTER — OFFICE VISIT (OUTPATIENT)
Dept: HEMATOLOGY/ONCOLOGY | Facility: CLINIC | Age: 74
End: 2024-05-01
Payer: MEDICARE

## 2024-05-01 VITALS
RESPIRATION RATE: 16 BRPM | WEIGHT: 137.38 LBS | HEIGHT: 65 IN | SYSTOLIC BLOOD PRESSURE: 122 MMHG | BODY MASS INDEX: 22.89 KG/M2 | DIASTOLIC BLOOD PRESSURE: 82 MMHG | HEART RATE: 103 BPM | TEMPERATURE: 98 F

## 2024-05-01 DIAGNOSIS — C50.911 INVASIVE DUCTAL CARCINOMA OF BREAST, FEMALE, RIGHT: Primary | ICD-10-CM

## 2024-05-01 DIAGNOSIS — T45.1X5A CHEMOTHERAPY-INDUCED NAUSEA: Primary | ICD-10-CM

## 2024-05-01 DIAGNOSIS — R11.0 CHEMOTHERAPY-INDUCED NAUSEA: Primary | ICD-10-CM

## 2024-05-01 DIAGNOSIS — T45.1X5A CHEMOTHERAPY INDUCED NEUTROPENIA: ICD-10-CM

## 2024-05-01 DIAGNOSIS — R43.2 ALTERED TASTE: ICD-10-CM

## 2024-05-01 DIAGNOSIS — D70.1 CHEMOTHERAPY INDUCED NEUTROPENIA: ICD-10-CM

## 2024-05-01 DIAGNOSIS — K52.1 DIARRHEA DUE TO DRUG: ICD-10-CM

## 2024-05-01 PROCEDURE — 3008F BODY MASS INDEX DOCD: CPT | Mod: CPTII,S$GLB,, | Performed by: INTERNAL MEDICINE

## 2024-05-01 PROCEDURE — 1101F PT FALLS ASSESS-DOCD LE1/YR: CPT | Mod: CPTII,S$GLB,, | Performed by: INTERNAL MEDICINE

## 2024-05-01 PROCEDURE — 3288F FALL RISK ASSESSMENT DOCD: CPT | Mod: CPTII,S$GLB,, | Performed by: INTERNAL MEDICINE

## 2024-05-01 PROCEDURE — 3079F DIAST BP 80-89 MM HG: CPT | Mod: CPTII,S$GLB,, | Performed by: INTERNAL MEDICINE

## 2024-05-01 PROCEDURE — 3074F SYST BP LT 130 MM HG: CPT | Mod: CPTII,S$GLB,, | Performed by: INTERNAL MEDICINE

## 2024-05-01 PROCEDURE — 99215 OFFICE O/P EST HI 40 MIN: CPT | Mod: S$GLB,,, | Performed by: INTERNAL MEDICINE

## 2024-05-01 PROCEDURE — 1126F AMNT PAIN NOTED NONE PRSNT: CPT | Mod: CPTII,S$GLB,, | Performed by: INTERNAL MEDICINE

## 2024-05-01 RX ORDER — SCOLOPAMINE TRANSDERMAL SYSTEM 1 MG/1
1 PATCH, EXTENDED RELEASE TRANSDERMAL
Qty: 4 PATCH | Refills: 4 | Status: SHIPPED | OUTPATIENT
Start: 2024-05-01

## 2024-05-06 DIAGNOSIS — K52.1 CHEMOTHERAPY INDUCED DIARRHEA: ICD-10-CM

## 2024-05-06 DIAGNOSIS — T45.1X5A CHEMOTHERAPY INDUCED DIARRHEA: ICD-10-CM

## 2024-05-06 RX ORDER — DIPHENOXYLATE HYDROCHLORIDE AND ATROPINE SULFATE 2.5; .025 MG/1; MG/1
1 TABLET ORAL 4 TIMES DAILY PRN
Qty: 30 TABLET | Refills: 1 | Status: SHIPPED | OUTPATIENT
Start: 2024-05-06 | End: 2025-05-06

## 2024-05-08 ENCOUNTER — OFFICE VISIT (OUTPATIENT)
Dept: HEMATOLOGY/ONCOLOGY | Facility: CLINIC | Age: 74
End: 2024-05-08
Payer: MEDICARE

## 2024-05-08 VITALS
RESPIRATION RATE: 17 BRPM | TEMPERATURE: 98 F | SYSTOLIC BLOOD PRESSURE: 128 MMHG | HEIGHT: 65 IN | DIASTOLIC BLOOD PRESSURE: 71 MMHG | WEIGHT: 135 LBS | HEART RATE: 89 BPM | BODY MASS INDEX: 22.49 KG/M2

## 2024-05-08 DIAGNOSIS — D50.8 OTHER IRON DEFICIENCY ANEMIA: Primary | ICD-10-CM

## 2024-05-08 PROCEDURE — 3074F SYST BP LT 130 MM HG: CPT | Mod: CPTII,S$GLB,, | Performed by: INTERNAL MEDICINE

## 2024-05-08 PROCEDURE — 1126F AMNT PAIN NOTED NONE PRSNT: CPT | Mod: CPTII,S$GLB,, | Performed by: INTERNAL MEDICINE

## 2024-05-08 PROCEDURE — 3288F FALL RISK ASSESSMENT DOCD: CPT | Mod: CPTII,S$GLB,, | Performed by: INTERNAL MEDICINE

## 2024-05-08 PROCEDURE — 99214 OFFICE O/P EST MOD 30 MIN: CPT | Mod: S$GLB,,, | Performed by: INTERNAL MEDICINE

## 2024-05-08 PROCEDURE — 3078F DIAST BP <80 MM HG: CPT | Mod: CPTII,S$GLB,, | Performed by: INTERNAL MEDICINE

## 2024-05-08 PROCEDURE — 1159F MED LIST DOCD IN RCRD: CPT | Mod: CPTII,S$GLB,, | Performed by: INTERNAL MEDICINE

## 2024-05-08 PROCEDURE — 1101F PT FALLS ASSESS-DOCD LE1/YR: CPT | Mod: CPTII,S$GLB,, | Performed by: INTERNAL MEDICINE

## 2024-05-08 PROCEDURE — 3008F BODY MASS INDEX DOCD: CPT | Mod: CPTII,S$GLB,, | Performed by: INTERNAL MEDICINE

## 2024-05-08 NOTE — PROGRESS NOTES
[facility] Hematology Oncology Subsequent  encounter note    5/8/24    Subjective:      Patient ID:   Dora Atwood  74 y.o. female  1950  Waleska Plasencia, Javier Villanueva MD's      Chief Complaint:   R breast Cancer    HPI:  One hour spent discussing, examining, planning neoadj. Rx. Today.  74 y.o. female palpated a hard lump at the area of the tail of the right breast and the anterior right axilla.  The mass was hard, large, but movable and not fixed.  Dr. Meza, her PMD ordered a bilateral diagnostic mammogram and ultrasound.      She has inversion of the left and right nipple, she says this is a chronic finding and her breasts have always been with the inversion.  Pleomorphic microcalcifications extending from the upper central to the upper outer quadrant of the right breast are noted and cover an area of approximately 4 x 8 cm, these are new from prior studies.  The left breast shows no masses or microcalcifications.  There is a large mass at the right axilla measuring 4.5 cm in diameter.      Ultrasound of right breast and axilla show a 1 cm mass 3 cm from the nipple at the 9 o'clock position.  In the skin of the lateral and inferior right breast there are numerous small hypoechoic nodules on the order of 3-6 mm in size.  Ultrasound of the axilla shows a large mass with irregular borders at 3 x 4.8 cm.  Just lateral to this mass is a smaller mass at 3 x 2 cm.  The radiologist recommends biopsy of the nodule at 9:00 a.m. and the large right axillary mass presumed to be a metastatic lymph node.  He notes if the biopsy of the right breast nodule fails to demonstrate concordant histologic diagnosis, then stereotactic biopsy of the microcalcifications can be performed.    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  Focal ductal carcinoma insight 2, intermediate grade, solid pattern was seen.   ERP was negative, PRP was negative, HER2 Lisa was 3+ positive    Biopsy the right axilla lymph node returned positive for metastatic carcinoma.  Residual lymphoid architecture is not seen.    She had onset of menses at age 14.  She is a  2 para 2 miscarriage 0 individual.  She delivered her 1st child at the maternal age of 25 and the 2nd child at the maternal age of 30.  She took birth control pills for approximately 10 years.  She had bilateral tubal ligation at age 30.  She went through the menopause in her mid 40s and she did take hormone replacement therapy for approximately 10 years.      Mom had rectal cancer, dad had diabetes and heart disease.  A paternal aunt had breast cancer, maternal uncle  in his 20s of some type of cancer.  A brother has heart disease.  Another brother  of an overdose, and a 3rd brother  of unknown cause.  She has a sister that she believes may have type of cancer and another sister with diabetes.  Her son and daughter are alive and well.  There is no family history known to her of ovarian, prostate, pancreas, or melanoma cancers.      CAT of head negative for metastatic dx.  ECHO Ej Fx 65-68%  MRI of breast done 3/21/24.  I spoke with Radiology, they are awaiting mamm from , should have a report by 14 days, 24.  PET uptake in mass, R supraclavicular LN, infraclavicular LN, internal mammary LN, R axillary LN, and mild increase in FGD  with skin thickening and edema.    This week, tast is improved, BM NL.  Next Rx 5/15/24, adjusted dosages.  PN at L hand resolved.  Energy 8/10  Cataract surgery 2024.      ROS:   GEN: normal without any fever, night sweats or weight loss  HEENT: normal with no HA's, sore throat, stiff neck, changes in vision  CV: normal with no CP, SOB, PND, CARNEY or orthopnea  PULM: normal with no SOB, cough, hemoptysis, sputum or pleuritic pain  GI: normal with no abdominal pain, nausea, vomiting, constipation, diarrhea, melanotic stools, BRBPR,  "or hematemesis  : normal with no hematuria, dysuria  BREAST: See HPI  SKIN: See HPI     Hx BTL, cholecystectomy.    Review of patient's allergies indicates:   Allergen Reactions    Carboplatin Other (See Comments)     Chest tightness and flushing       No meds.    Objective:   Vitals:  Blood pressure 128/71, pulse 89, temperature 97.7 °F (36.5 °C), resp. rate 17, height 5' 5" (1.651 m), weight 61.2 kg (135 lb).    Physical Examination:   GEN: no apparent distress, comfortable  HEAD: atraumatic and normocephalic  EYES: no pallor, no icterus  ENT: no pharyngeal erythema, external ears WNL; no nasal discharge  NECK: no masses, thyroid normal, trachea midline, no LAD/LN's, supple  CV: RRR with no murmur; normal pulse  CHEST: Normal respiratory effort; CTAB; normal breath sounds; no wheeze or crackles  ABDOM: nontender and nondistended; soft; normal bowel sounds; no rebound/guarding  MUSC/Skeletal: ROM normal; no crepitus; joints normal; no deformities or arthropathy  EXTREM: no clubbing, cyanosis, inflammation or swelling  SKIN: no rashes, lesions, ulcers, petechiae or subcutaneous nodules  : no cvat  NEURO: grossly intact; motor/sensory WNL; no tremors  PSYCH: normal mood, affect and behavior  LYMPH: normal cervical, supraclavicular, and groin LN's  BREASTS:  Left breast is nontender, without discharge, and without palpable mass, left axilla is without palpable mass or lymphadenopathy.  Right breast has edema noted as compared to the right.  She does not have peau de orange change, and skin is intact.  I do not palpate the 1 cm tumor at the 9 o'clock position of the right breast.  She has a golf ball sized mass at the tail of the breast and anterior axillary area that is hard and movable, not fixed.  I do not palpate the 2nd lymph node in the right axilla as referenced on the radiology studies.    CBC, CMP, breast tumor markers will be ordered.  Echocardiogram for ejection fraction will be ordered.  MRI of the " brain with contrast for staging will be done.  MRI of the left and right breast with contrast to check for a 2nd primary in the left breast, given her lobular features, will be done.  PET scan is being done for staging, to check for metastatic disease?    Assessment:   (1) 74 y.o. female abnormal mammogram and ultrasound with 4.8 cm mass noted at the anterior axilla and tell of the breast, also a large area of new micro calcifications are seen in the upper outer quadrant of the right breast, and a 1 cm nodule is noted at the 9 o'clock position of the right breast.    (2) biopsy of the 9:00 a.m. nodule and the anterior axillary mass returned showing invasive mixed ductal and lobular carcinoma, grade 2, ERP negative, PRP negative, HER2 Lisa 3+ positive.    (3) my impression at this time is that she is at least stage II.  Staging studies will include MRI with contrast of the brain, MRI with contrast of the left and right breast, PET scan will be done to check for metastatic disease.  CBC, CMP, breast tumor markers have been requested and echocardiogram for ejection fraction has been requested.    (4) because of the negative ERP and PRP studies, tamoxifen or Arimidex would not be expected to help adjuvantly in decreasing her long-term systemic recurrence risk.    (5) the strongly 3+ positive HER2 Lisa would support that neoadjuvant carboplatin, taxane, Herceptin, Perjeta q. 3 weeks times 4-6 cycle may achieve a CHRIS state at the breast and axillary area after treatment.    (6) she does have the large area microcalcifications at the right breast in addition to the 9:00 a.m. biopsy-proven mass at the right breast.  The initial opinion as discussed with Dr. Villanueva, would be that she would require a right mastectomy and probably a right axillary node dissection, after the completion of neoadjuvant CTHP    (7) as discussed with Dr. Villanueva, she will probably need chest irradiation and right axillary radiation, after her  definitive surgery.    (8) will forward a copy of this note to Dr. Treviño, she sees him on Wednesday.  Will ask Dr. Treviño to place a carisa catheter to facilitate systemic neoadjuvant treatment.    For now the PET scan is scheduled March 6 at 3:45 p.m..  Echocardiogram is scheduled at March 8th at 2:30 p.m..  MRI of brain is scheduled at March 8th at 3:00 p.m.  MRI of the breast is scheduled for March 21st at 3:15 p.m.  Iram is going to work on trying to get the MRI of the breast sooner.    (9) knowledge of her family history of cancer is incomplete.  A paternal aunt had breast cancer, a maternal uncle had cancer of unknown type, a sister gives a history of possible cancer.  I have requested BRCA 1 and BRCA 2 testing with an extended profile.    I am going to ask her to follow up with myself during the week of March 11th to review the study results.  I am trying to get her started on the neoadjuvant treatment towards the end of March 11th week after the port has been placed and the local site has healed sufficiently.    I have spent 1 hour interviewing and the patient and examining the patient and discussing my findings and recommendations as outlined above.  It has taken me approximately another hour to summarize the reports, discuss with Dr. Villanueva, schedule her studies with Iram and to place this note into [email or web address],  On the visit at the comprehensive clinic last time.    Today 3/15/24  call for Adriano Jay, await MRI of breasts, port placement.    CTHP neoadjuvant Rx q 3 weeks x's 4-6 cycles.  D1C1 4/3/24.  At the end of Carboplatin infusion, she c/o chest pain.Isabell whitman NP saw her, medicated and sx resolved.  Suspected reaction to carboplatin, first infusion.    Cytoxan, Taxotere, Herceptin, Perjeta.  5/15/24.  Adjust dosages.  See Isabell 5/15/24.                          +

## 2024-05-09 NOTE — PROGRESS NOTES
[facility] Hematology Oncology Subsequent  encounter note    5/1/24    Subjective:      Patient ID:   Dora Atwood  74 y.o. female  1950  Waleska Plasencia, Javier Villanueva MD's      Chief Complaint:   R breast Cancer    HPI:  One hour spent discussing, examining, planning neoadj. Rx. Today.  74 y.o. female palpated a hard lump at the area of the tail of the right breast and the anterior right axilla.  The mass was hard, large, but movable and not fixed.  Dr. Meza, her PMD ordered a bilateral diagnostic mammogram and ultrasound.      She has inversion of the left and right nipple, she says this is a chronic finding and her breasts have always been with the inversion.  Pleomorphic microcalcifications extending from the upper central to the upper outer quadrant of the right breast are noted and cover an area of approximately 4 x 8 cm, these are new from prior studies.  The left breast shows no masses or microcalcifications.  There is a large mass at the right axilla measuring 4.5 cm in diameter.      Ultrasound of right breast and axilla show a 1 cm mass 3 cm from the nipple at the 9 o'clock position.  In the skin of the lateral and inferior right breast there are numerous small hypoechoic nodules on the order of 3-6 mm in size.  Ultrasound of the axilla shows a large mass with irregular borders at 3 x 4.8 cm.  Just lateral to this mass is a smaller mass at 3 x 2 cm.  The radiologist recommends biopsy of the nodule at 9:00 a.m. and the large right axillary mass presumed to be a metastatic lymph node.  He notes if the biopsy of the right breast nodule fails to demonstrate concordant histologic diagnosis, then stereotactic biopsy of the microcalcifications can be performed.    Right breast biopsy at 9:00 o'clock  showed invasive carcinoma with mixed ductal and lobular features, Elida grade 2, measuring 8 mm on biopsy.  Focal ductal carcinoma insight 2, intermediate grade, solid pattern was seen.   ERP was negative, PRP was negative, HER2 Lisa was 3+ positive    Biopsy the right axilla lymph node returned positive for metastatic carcinoma.  Residual lymphoid architecture is not seen.    She had onset of menses at age 14.  She is a  2 para 2 miscarriage 0 individual.  She delivered her 1st child at the maternal age of 25 and the 2nd child at the maternal age of 30.  She took birth control pills for approximately 10 years.  She had bilateral tubal ligation at age 30.  She went through the menopause in her mid 40s and she did take hormone replacement therapy for approximately 10 years.      Mom had rectal cancer, dad had diabetes and heart disease.  A paternal aunt had breast cancer, maternal uncle  in his 20s of some type of cancer.  A brother has heart disease.  Another brother  of an overdose, and a 3rd brother  of unknown cause.  She has a sister that she believes may have type of cancer and another sister with diabetes.  Her son and daughter are alive and well.  There is no family history known to her of ovarian, prostate, pancreas, or melanoma cancers.      CAT of head negative for metastatic dx.  ECHO Ej Fx 65-68%  MRI of breast done 3/21/24.  I spoke with Radiology, they are awaiting mamm from , should have a report by 14 days, 24.  PET uptake in mass, R supraclavicular LN, infraclavicular LN, internal mammary LN, R axillary LN, and mild increase in FGD  with skin thickening and edema.    Hgb 11.9., CMP NL.  BrCa 1 & 2 pending.  For CXR, port placement 3/19/24.  Reviewed.    After Rx, metalic taste in mouth, nausea, neuropathy sx in hands, marked diarrhea.  Appears depressed, she denies depression.  After course #4, check MRI of breast and/or PET.  RTC 1 week.        ROS:   GEN: normal without any fever, night sweats or weight loss  HEENT: normal with no HA's, sore throat, stiff neck, changes in vision  CV: normal with no CP, SOB, PND, CARNEY or orthopnea  PULM: normal with no SOB,  "cough, hemoptysis, sputum or pleuritic pain  GI: normal with no abdominal pain, nausea, vomiting, constipation, diarrhea, melanotic stools, BRBPR, or hematemesis  : normal with no hematuria, dysuria  BREAST: See HPI  SKIN: See HPI     Hx BTL, cholecystectomy.    Review of patient's allergies indicates:   Allergen Reactions    Carboplatin Other (See Comments)     Chest tightness and flushing       No meds.    Objective:   Vitals:  Blood pressure 122/82, pulse 103, temperature 98 °F (36.7 °C), resp. rate 16, height 5' 5" (1.651 m), weight 62.3 kg (137 lb 6.4 oz).    Physical Examination:   GEN: no apparent distress, comfortable  HEAD: atraumatic and normocephalic  EYES: no pallor, no icterus  ENT: no pharyngeal erythema, external ears WNL; no nasal discharge  NECK: no masses, thyroid normal, trachea midline, no LAD/LN's, supple  CV: RRR with no murmur; normal pulse  CHEST: Normal respiratory effort; CTAB; normal breath sounds; no wheeze or crackles  ABDOM: nontender and nondistended; soft; normal bowel sounds; no rebound/guarding  MUSC/Skeletal: ROM normal; no crepitus; joints normal; no deformities or arthropathy  EXTREM: no clubbing, cyanosis, inflammation or swelling  SKIN: no rashes, lesions, ulcers, petechiae or subcutaneous nodules  : no cvat  NEURO: grossly intact; motor/sensory WNL; no tremors  PSYCH: normal mood, affect and behavior  LYMPH: normal cervical, supraclavicular, and groin LN's  BREASTS:  Left breast is nontender, without discharge, and without palpable mass, left axilla is without palpable mass or lymphadenopathy.  Right breast has edema noted as compared to the right.  She does not have peau de orange change, and skin is intact.  I do not palpate the 1 cm tumor at the 9 o'clock position of the right breast.  She has a golf ball sized mass at the tail of the breast and anterior axillary area that is hard and movable, not fixed.  I do not palpate the 2nd lymph node in the right axilla as " referenced on the radiology studies.    CBC, CMP, breast tumor markers will be ordered.  Echocardiogram for ejection fraction will be ordered.  MRI of the brain with contrast for staging will be done.  MRI of the left and right breast with contrast to check for a 2nd primary in the left breast, given her lobular features, will be done.  PET scan is being done for staging, to check for metastatic disease?    Assessment:   (1) 74 y.o. female abnormal mammogram and ultrasound with 4.8 cm mass noted at the anterior axilla and tell of the breast, also a large area of new micro calcifications are seen in the upper outer quadrant of the right breast, and a 1 cm nodule is noted at the 9 o'clock position of the right breast.    (2) biopsy of the 9:00 a.m. nodule and the anterior axillary mass returned showing invasive mixed ductal and lobular carcinoma, grade 2, ERP negative, PRP negative, HER2 Lisa 3+ positive.    (3) my impression at this time is that she is at least stage II.  Staging studies will include MRI with contrast of the brain, MRI with contrast of the left and right breast, PET scan will be done to check for metastatic disease.  CBC, CMP, breast tumor markers have been requested and echocardiogram for ejection fraction has been requested.    (4) because of the negative ERP and PRP studies, tamoxifen or Arimidex would not be expected to help adjuvantly in decreasing her long-term systemic recurrence risk.    (5) the strongly 3+ positive HER2 Lisa would support that neoadjuvant carboplatin, taxane, Herceptin, Perjeta q. 3 weeks times 4-6 cycle may achieve a CHRIS state at the breast and axillary area after treatment.    (6) she does have the large area microcalcifications at the right breast in addition to the 9:00 a.m. biopsy-proven mass at the right breast.  The initial opinion as discussed with Dr. Villanueva, would be that she would require a right mastectomy and probably a right axillary node dissection, after the  completion of neoadjuvant CTHP    (7) as discussed with Dr. Villanueva, she will probably need chest irradiation and right axillary radiation, after her definitive surgery.    (8) will forward a copy of this note to Dr. Treviño, she sees him on Wednesday.  Will ask Dr. Treviño to place a carisa catheter to facilitate systemic neoadjuvant treatment.    For now the PET scan is scheduled March 6 at 3:45 p.m..  Echocardiogram is scheduled at March 8th at 2:30 p.m..  MRI of brain is scheduled at March 8th at 3:00 p.m.  MRI of the breast is scheduled for March 21st at 3:15 p.m.  Iram is going to work on trying to get the MRI of the breast sooner.    (9) knowledge of her family history of cancer is incomplete.  A paternal aunt had breast cancer, a maternal uncle had cancer of unknown type, a sister gives a history of possible cancer.  I have requested BRCA 1 and BRCA 2 testing with an extended profile.    I am going to ask her to follow up with myself during the week of March 11th to review the study results.  I am trying to get her started on the neoadjuvant treatment towards the end of March 11th week after the port has been placed and the local site has healed sufficiently.    I have spent 1 hour interviewing and the patient and examining the patient and discussing my findings and recommendations as outlined above.  It has taken me approximately another hour to summarize the reports, discuss with Dr. Villanueva, schedule her studies with Iram and to place this note into [employer],  On the visit at the comprehensive clinic last time.    Today 3/15/24  call for Ej Fx, await MRI of breasts, port placement.    CTHP neoadjuvant Rx q 3 weeks x's 4-6 cycles.  D1C1 4/3/24.  At the end of Carboplatin infusion, she c/o chest pain.Isabell whitman NP saw her, medicated and sx resolved.  Suspected reaction to carboplatin, first infusion.    Option of giving additional premeds, prolonged carboplatin over 2 hours.  Or  Option of D/C carboplatin, and  placing Cytoxan into the D1C2.  Discussed with pt, she RTC tomorrow for Zofran, Udenyca.    D1C2 included cytoxan taxotere, herceptin, perjeta.  Sx as above 2nd chemo/I.O.    RTC 1 week.  D1C3 in 2 weeks, decrease dose.                          +

## 2024-05-14 ENCOUNTER — TELEPHONE (OUTPATIENT)
Facility: CLINIC | Age: 74
End: 2024-05-14
Payer: MEDICARE

## 2024-05-14 RX ORDER — SODIUM CHLORIDE 0.9 % (FLUSH) 0.9 %
10 SYRINGE (ML) INJECTION
Status: CANCELLED
Start: 2024-05-16

## 2024-05-14 RX ORDER — DIPHENHYDRAMINE HYDROCHLORIDE 50 MG/ML
50 INJECTION INTRAMUSCULAR; INTRAVENOUS ONCE AS NEEDED
Status: CANCELLED | OUTPATIENT
Start: 2024-05-15

## 2024-05-14 RX ORDER — MEPERIDINE HYDROCHLORIDE 50 MG/ML
25 INJECTION INTRAMUSCULAR; INTRAVENOUS; SUBCUTANEOUS ONCE AS NEEDED
Status: CANCELLED | OUTPATIENT
Start: 2024-05-15

## 2024-05-14 RX ORDER — EPINEPHRINE 0.3 MG/.3ML
0.3 INJECTION SUBCUTANEOUS ONCE AS NEEDED
Status: CANCELLED | OUTPATIENT
Start: 2024-05-15

## 2024-05-14 RX ORDER — HEPARIN 100 UNIT/ML
500 SYRINGE INTRAVENOUS
Status: CANCELLED | OUTPATIENT
Start: 2024-05-15

## 2024-05-14 RX ORDER — SODIUM CHLORIDE 0.9 % (FLUSH) 0.9 %
10 SYRINGE (ML) INJECTION
Status: CANCELLED | OUTPATIENT
Start: 2024-05-15

## 2024-05-14 RX ORDER — PROCHLORPERAZINE EDISYLATE 5 MG/ML
5 INJECTION INTRAMUSCULAR; INTRAVENOUS ONCE AS NEEDED
Status: CANCELLED | OUTPATIENT
Start: 2024-05-15

## 2024-05-14 RX ORDER — HEPARIN 100 UNIT/ML
500 SYRINGE INTRAVENOUS
Status: CANCELLED
Start: 2024-05-16

## 2024-05-14 NOTE — TELEPHONE ENCOUNTER
Jerry I treated her with Carbo, taxotere, herceptin and Perjeta for 1st cycle.  She had Rx to Carbo.    For cycle #2, I changed her over to   Cytoxan, taxotere, Herceptin and Perjeta.    She c/o metallic taste x's 2 weeks.  She c/o diarrhea x's 2 weeks.  Better now.    Due for D1C3 tomorrow.    Can you guesstimate which of the drugs gave metallic taste?  Are there parameters for reducing the Perjeta here?    Alexys Boyer MD

## 2024-05-15 ENCOUNTER — OFFICE VISIT (OUTPATIENT)
Facility: CLINIC | Age: 74
End: 2024-05-15
Payer: MEDICARE

## 2024-05-15 ENCOUNTER — TELEPHONE (OUTPATIENT)
Facility: CLINIC | Age: 74
End: 2024-05-15

## 2024-05-15 ENCOUNTER — INFUSION (OUTPATIENT)
Dept: INFUSION THERAPY | Facility: HOSPITAL | Age: 74
End: 2024-05-15
Attending: INTERNAL MEDICINE
Payer: MEDICARE

## 2024-05-15 VITALS
HEIGHT: 65 IN | TEMPERATURE: 98 F | DIASTOLIC BLOOD PRESSURE: 82 MMHG | SYSTOLIC BLOOD PRESSURE: 151 MMHG | RESPIRATION RATE: 18 BRPM | HEART RATE: 100 BPM | BODY MASS INDEX: 23.64 KG/M2 | WEIGHT: 141.88 LBS | OXYGEN SATURATION: 99 %

## 2024-05-15 VITALS
HEIGHT: 65 IN | SYSTOLIC BLOOD PRESSURE: 151 MMHG | OXYGEN SATURATION: 99 % | TEMPERATURE: 98 F | RESPIRATION RATE: 18 BRPM | WEIGHT: 141.88 LBS | BODY MASS INDEX: 23.64 KG/M2 | DIASTOLIC BLOOD PRESSURE: 82 MMHG | HEART RATE: 100 BPM

## 2024-05-15 DIAGNOSIS — K52.1 DIARRHEA DUE TO DRUG: ICD-10-CM

## 2024-05-15 DIAGNOSIS — D70.1 CHEMOTHERAPY INDUCED NEUTROPENIA: ICD-10-CM

## 2024-05-15 DIAGNOSIS — T45.1X5A CHEMOTHERAPY INDUCED NEUTROPENIA: ICD-10-CM

## 2024-05-15 DIAGNOSIS — D50.9 IRON DEFICIENCY ANEMIA, UNSPECIFIED IRON DEFICIENCY ANEMIA TYPE: ICD-10-CM

## 2024-05-15 DIAGNOSIS — C50.911 INVASIVE DUCTAL CARCINOMA OF BREAST, FEMALE, RIGHT: Primary | ICD-10-CM

## 2024-05-15 DIAGNOSIS — D50.9 IRON DEFICIENCY ANEMIA, UNSPECIFIED IRON DEFICIENCY ANEMIA TYPE: Primary | ICD-10-CM

## 2024-05-15 PROCEDURE — 96375 TX/PRO/DX INJ NEW DRUG ADDON: CPT

## 2024-05-15 PROCEDURE — A4216 STERILE WATER/SALINE, 10 ML: HCPCS | Performed by: INTERNAL MEDICINE

## 2024-05-15 PROCEDURE — 63600175 PHARM REV CODE 636 W HCPCS: Mod: JZ,JG | Performed by: INTERNAL MEDICINE

## 2024-05-15 PROCEDURE — 3077F SYST BP >= 140 MM HG: CPT | Mod: CPTII,S$GLB,, | Performed by: INTERNAL MEDICINE

## 2024-05-15 PROCEDURE — 1126F AMNT PAIN NOTED NONE PRSNT: CPT | Mod: CPTII,S$GLB,, | Performed by: INTERNAL MEDICINE

## 2024-05-15 PROCEDURE — 99215 OFFICE O/P EST HI 40 MIN: CPT | Mod: S$GLB,,, | Performed by: INTERNAL MEDICINE

## 2024-05-15 PROCEDURE — 96413 CHEMO IV INFUSION 1 HR: CPT

## 2024-05-15 PROCEDURE — 99999 PR PBB SHADOW E&M-EST. PATIENT-LVL III: CPT | Mod: PBBFAC,,, | Performed by: INTERNAL MEDICINE

## 2024-05-15 PROCEDURE — 3079F DIAST BP 80-89 MM HG: CPT | Mod: CPTII,S$GLB,, | Performed by: INTERNAL MEDICINE

## 2024-05-15 PROCEDURE — 96367 TX/PROPH/DG ADDL SEQ IV INF: CPT

## 2024-05-15 PROCEDURE — 25000003 PHARM REV CODE 250: Performed by: INTERNAL MEDICINE

## 2024-05-15 PROCEDURE — 3008F BODY MASS INDEX DOCD: CPT | Mod: CPTII,S$GLB,, | Performed by: INTERNAL MEDICINE

## 2024-05-15 PROCEDURE — 96417 CHEMO IV INFUS EACH ADDL SEQ: CPT

## 2024-05-15 RX ORDER — HEPARIN 100 UNIT/ML
500 SYRINGE INTRAVENOUS
Status: DISCONTINUED | OUTPATIENT
Start: 2024-05-15 | End: 2024-05-15 | Stop reason: HOSPADM

## 2024-05-15 RX ORDER — HEPARIN 100 UNIT/ML
5 SYRINGE INTRAVENOUS
OUTPATIENT
Start: 2024-05-23

## 2024-05-15 RX ORDER — EPINEPHRINE 0.3 MG/.3ML
0.3 INJECTION SUBCUTANEOUS ONCE AS NEEDED
Status: DISCONTINUED | OUTPATIENT
Start: 2024-05-15 | End: 2024-05-15 | Stop reason: HOSPADM

## 2024-05-15 RX ORDER — MEPERIDINE HYDROCHLORIDE 25 MG/ML
25 INJECTION INTRAMUSCULAR; INTRAVENOUS; SUBCUTANEOUS ONCE AS NEEDED
Status: DISCONTINUED | OUTPATIENT
Start: 2024-05-15 | End: 2024-05-15 | Stop reason: HOSPADM

## 2024-05-15 RX ORDER — SODIUM CHLORIDE 0.9 % (FLUSH) 0.9 %
10 SYRINGE (ML) INJECTION
Status: DISCONTINUED | OUTPATIENT
Start: 2024-05-15 | End: 2024-05-15 | Stop reason: HOSPADM

## 2024-05-15 RX ORDER — PROCHLORPERAZINE EDISYLATE 5 MG/ML
5 INJECTION INTRAMUSCULAR; INTRAVENOUS ONCE AS NEEDED
Status: DISCONTINUED | OUTPATIENT
Start: 2024-05-15 | End: 2024-05-15 | Stop reason: HOSPADM

## 2024-05-15 RX ORDER — SODIUM CHLORIDE 9 MG/ML
INJECTION, SOLUTION INTRAVENOUS CONTINUOUS
OUTPATIENT
Start: 2024-05-23

## 2024-05-15 RX ORDER — EPINEPHRINE 0.3 MG/.3ML
0.3 INJECTION SUBCUTANEOUS ONCE AS NEEDED
OUTPATIENT
Start: 2024-05-23

## 2024-05-15 RX ORDER — DIPHENHYDRAMINE HYDROCHLORIDE 50 MG/ML
25 INJECTION INTRAMUSCULAR; INTRAVENOUS ONCE
OUTPATIENT
Start: 2024-05-23 | End: 2024-05-23

## 2024-05-15 RX ORDER — SODIUM CHLORIDE 0.9 % (FLUSH) 0.9 %
10 SYRINGE (ML) INJECTION
OUTPATIENT
Start: 2024-05-23

## 2024-05-15 RX ORDER — DIPHENHYDRAMINE HYDROCHLORIDE 50 MG/ML
50 INJECTION INTRAMUSCULAR; INTRAVENOUS ONCE AS NEEDED
Status: DISCONTINUED | OUTPATIENT
Start: 2024-05-15 | End: 2024-05-15 | Stop reason: HOSPADM

## 2024-05-15 RX ORDER — DIPHENHYDRAMINE HYDROCHLORIDE 50 MG/ML
25 INJECTION INTRAMUSCULAR; INTRAVENOUS ONCE
Start: 2024-05-23

## 2024-05-15 RX ADMIN — HEPARIN 500 UNITS: 100 SYRINGE at 01:05

## 2024-05-15 RX ADMIN — APREPITANT 130 MG: 130 INJECTION, EMULSION INTRAVENOUS at 11:05

## 2024-05-15 RX ADMIN — PALONOSETRON HYDROCHLORIDE 0.25 MG: 0.25 INJECTION INTRAVENOUS at 11:05

## 2024-05-15 RX ADMIN — SODIUM CHLORIDE 130 MG: 9 INJECTION, SOLUTION INTRAVENOUS at 12:05

## 2024-05-15 RX ADMIN — Medication 10 ML: at 01:05

## 2024-05-15 RX ADMIN — SODIUM CHLORIDE: 9 INJECTION, SOLUTION INTRAVENOUS at 09:05

## 2024-05-15 RX ADMIN — TRASTUZUMAB-ANNS 390 MG: 420 INJECTION, POWDER, LYOPHILIZED, FOR SOLUTION INTRAVENOUS at 10:05

## 2024-05-15 RX ADMIN — PERTUZUMAB 420 MG: 30 INJECTION, SOLUTION, CONCENTRATE INTRAVENOUS at 09:05

## 2024-05-15 RX ADMIN — CYCLOPHOSPHAMIDE 1040 MG: 200 INJECTION, SOLUTION INTRAVENOUS at 11:05

## 2024-05-15 NOTE — PLAN OF CARE
Problem: Fall Injury Risk  Goal: Absence of Fall and Fall-Related Injury  Outcome: Progressing  Intervention: Identify and Manage Contributors  Flowsheets (Taken 5/15/2024 0840)  Self-Care Promotion: independence encouraged  Medication Review/Management: medications reviewed  Intervention: Promote Injury-Free Environment  Flowsheets (Taken 5/15/2024 0840)  Safety Promotion/Fall Prevention: medications reviewed

## 2024-05-15 NOTE — PROGRESS NOTES
[facility] Subsequent  encounter note    5/15/24    Subjective:      Patient ID:   Dora Atwood  74 y.o. female  1950  Waleska Plasencia, Javier Villanueva MD's      Chief Complaint:   R breast Cancer    HPI:   74 y.o. female palpated a hard lump at the area of the tail of the right breast and the anterior right axilla.  The mass was hard, large, but movable and not fixed.  Dr. Meza, her PMD ordered a bilateral diagnostic mammogram and ultrasound.      She has inversion of the left and right nipple, she says this is a chronic finding and her breasts have always been with the inversion.  Pleomorphic microcalcifications extending from the upper central to the upper outer quadrant of the right breast are noted and cover an area of approximately 4 x 8 cm, these are new from prior studies.  The left breast shows no masses or microcalcifications.  There is a large mass at the right axilla measuring 4.5 cm in diameter.      Ultrasound of right breast and axilla show a 1 cm mass 3 cm from the nipple at the 9 o'clock position.  In the skin of the lateral and inferior right breast there are numerous small hypoechoic nodules on the order of 3-6 mm in size.  Ultrasound of the axilla shows a large mass with irregular borders at 3 x 4.8 cm.  Just lateral to this mass is a smaller mass at 3 x 2 cm.  The radiologist recommends biopsy of the nodule at 9:00 a.m. and the large right axillary mass presumed to be a metastatic lymph node.  He notes if the biopsy of the right breast nodule fails to demonstrate concordant histologic diagnosis, then stereotactic biopsy of the microcalcifications can be performed.    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  Focal ductal carcinoma insight 2, intermediate grade, solid pattern was seen.  ERP was negative, PRP was negative, HER2 Lisa was 3+  positive    Biopsy the right axilla lymph node returned positive for metastatic carcinoma.  Residual lymphoid architecture is not seen.    She had onset of menses at age 14.  She is a  2 para 2 miscarriage 0 individual.  She delivered her 1st child at the maternal age of 25 and the 2nd child at the maternal age of 30.  She took birth control pills for approximately 10 years.  She had bilateral tubal ligation at age 30.  She went through the menopause in her mid 40s and she did take hormone replacement therapy for approximately 10 years.      Mom had rectal cancer, dad had diabetes and heart disease.  A paternal aunt had breast cancer, maternal uncle  in his 20s of some type of cancer.  A brother has heart disease.  Another brother  of an overdose, and a 3rd brother  of unknown cause.  She has a sister that she believes may have type of cancer and another sister with diabetes.  Her son and daughter are alive and well.  There is no family history known to her of ovarian, prostate, pancreas, or melanoma cancers.      CAT of head negative for metastatic dx.  ECHO Ej Fx 65-68%  MRI of breast done 3/21/24.  I spoke with Radiology, they are awaiting mamm from , should have a report by 14 days, 24.  PET uptake in mass, R supraclavicular LN, infraclavicular LN, internal mammary LN, R axillary LN, and mild increase in FGD  with skin thickening and edema.    This week, tast is improved, BM NL.  Next Rx 5/15/24, adjusted dosages.  PN at L hand resolved.  Energy 8/10  Cataract surgery 2024.    Today May 15th she is much improved and in better spirits.  Taste has returned to normal at this time and appetite has recovered, she actually gets hungry.  Her BMI is formed.  Today is day 1 of her 3rd cycle of chemotherapy.  And she does expect that her symptoms will recur later this weekend.  She does have her scopolamine patch in place, without nausea today, she did not have to take Zofran or Phenergan today.  " She is going to take the Lomotil on Saturday x3 or 4 days preventively and then will take the Lomotil again if diarrhea symptoms recur.    Now on exam the anterior axillary lymph node is not palpable and the breast mass laterally is not palpable after 2 cycles of chemotherapy.  She did she is developing a small hernia at the site of her previous gallbladder surgery.  The hernia is nontender and easily reducible.    Her hemoglobin is 8.7 and her ferritin returned low at 21.  She could not tolerate oral iron because of GI symptoms.  She does agree to injected for [facility]  There is a 1 or 2% risk of reaction to iron infusion.  Will give her Benadryl as a premedication and will observe her for 1 hour after each infusion.  Iron replenishment here will facilitate red blood cell production and improve her hemoglobin and should improve her energy.    She is to see Isabell the nurse practitioner next week and I will see her in 2 weeks to see how she is doing.      ROS:   GEN: normal without any fever, night sweats or weight loss  HEENT: normal with no HA's, sore throat, stiff neck, changes in vision  CV: normal with no CP, SOB, PND, CARNEY or orthopnea  PULM: normal with no SOB, cough, hemoptysis, sputum or pleuritic pain  GI: normal with no abdominal pain, nausea, vomiting, constipation, diarrhea, melanotic stools, BRBPR, or hematemesis  : normal with no hematuria, dysuria  BREAST: See HPI  SKIN: See HPI     Hx BTL, cholecystectomy.    Review of patient's allergies indicates:   Allergen Reactions    Carboplatin Other (See Comments)     Chest tightness and flushing       No meds.    Objective:   Vitals:  Blood pressure (!) 151/82, pulse 100, temperature 97.8 °F (36.6 °C), resp. rate 18, height 5' 5" (1.651 m), weight 64.4 kg (141 lb 14.4 oz), SpO2 99%.    Physical Examination:   GEN: no apparent distress, comfortable  HEAD: atraumatic and " normocephalic  EYES: no pallor, no icterus  ENT: no pharyngeal erythema, external ears WNL; no nasal discharge  NECK: no masses, thyroid normal, trachea midline, no LAD/LN's, supple  CV: RRR with no murmur; normal pulse  CHEST: Normal respiratory effort; CTAB; normal breath sounds; no wheeze or crackles  ABDOM: nontender and nondistended; soft; normal bowel sounds; no rebound/guarding  MUSC/Skeletal: ROM normal; no crepitus; joints normal; no deformities or arthropathy  EXTREM: no clubbing, cyanosis, inflammation or swelling  SKIN: no rashes, lesions, ulcers, petechiae or subcutaneous nodules  : no cvat  NEURO: grossly intact; motor/sensory WNL; no tremors  PSYCH: normal mood, affect and behavior  LYMPH: normal cervical, supraclavicular, and groin LN's  BREASTS:  Left breast is nontender, without discharge, and without palpable mass, left axilla is without palpable mass or lymphadenopathy.  Right breast has edema noted as compared to the right.  She does not have peau de orange change, and skin is intact.  I do not palpate the 1 cm tumor at the 9 o'clock position of the right breast.  She has a golf ball sized mass at the tail of the breast and anterior axillary area that is hard and movable, not fixed.  I do not palpate the 2nd lymph node in the right axilla as referenced on the radiology studies.    CBC, CMP, breast tumor markers will be ordered.  Echocardiogram for ejection fraction will be ordered.  MRI of the brain with contrast for staging will be done.  MRI of the left and right breast with contrast to check for a 2nd primary in the left breast, given her lobular features, will be done.  PET scan is being done for staging, to check for metastatic disease?    Assessment:   (1) 74 y.o. female abnormal mammogram and ultrasound with 4.8 cm mass noted at the anterior axilla and tell of the breast, also a large area of new micro calcifications are seen in the upper outer quadrant of the right breast, and a 1 cm  nodule is noted at the 9 o'clock position of the right breast.    (2) biopsy of the 9:00 a.m. nodule and the anterior axillary mass returned showing invasive mixed ductal and lobular carcinoma, grade 2, ERP negative, PRP negative, HER2 Lisa 3+ positive.    (3) my impression at this time is that she is at least stage II.  Staging studies will include MRI with contrast of the brain, MRI with contrast of the left and right breast, PET scan will be done to check for metastatic disease.  CBC, CMP, breast tumor markers have been requested and echocardiogram for ejection fraction has been requested.    (4) because of the negative ERP and PRP studies, tamoxifen or Arimidex would not be expected to help adjuvantly in decreasing her long-term systemic recurrence risk.    (5) the strongly 3+ positive HER2 Lisa would support that neoadjuvant carboplatin, taxane, Herceptin, Perjeta q. 3 weeks times 4-6 cycle may achieve a CHRIS state at the breast and axillary area after treatment.    (6) she does have the large area microcalcifications at the right breast in addition to the 9:00 a.m. biopsy-proven mass at the right breast.  The initial opinion as discussed with Dr. Villanueva, would be that she would require a right mastectomy and probably a right axillary node dissection, after the completion of neoadjuvant CTHP    (7) as discussed with Dr. Villanueva, she will probably need chest irradiation and right axillary radiation, after her definitive surgery.    (8) will forward a copy of this note to Dr. Treviño, she sees him on Wednesday.  Will ask Dr. Treviño to place a carisa catheter to facilitate systemic neoadjuvant treatment.    For now the PET scan is scheduled March 6 at 3:45 p.m..  Echocardiogram is scheduled at March 8th at 2:30 p.m..  MRI of brain is scheduled at March 8th at 3:00 p.m.  MRI of the breast is scheduled for March 21st at 3:15 p.m.  Iram is going to work on trying to get the MRI of the breast sooner.    (9) knowledge of her  family history of cancer is incomplete.  A paternal aunt had breast cancer, a maternal uncle had cancer of unknown type, a sister gives a history of possible cancer.  I have requested BRCA 1 and BRCA 2 testing with an extended profile.    I am going to ask her to follow up with myself during the week of March 11th to review the study results.  I am trying to get her started on the neoadjuvant treatment towards the end of March 11th week after the port has been placed and the local site has healed sufficiently.    I have spent 1 hour interviewing and the patient and examining the patient and discussing my findings and recommendations as outlined above.  It has taken me approximately another hour to summarize the reports, discuss with Dr. Villanueva, schedule her studies with Iram and to place this note into [employer],  On the visit at the comprehensive clinic last time.    Today 3/15/24  call for Ej Fx, await MRI of breasts, port placement.    CTHP neoadjuvant Rx q 3 weeks x's 4-6 cycles.  D1C1 4/3/24.  At the end of Carboplatin infusion, she c/o chest pain.Isabell whitman NP saw her, medicated and sx resolved.  Suspected reaction to carboplatin, first infusion.    Cytoxan, Taxotere, Herceptin, Perjeta.  5/15/24.  Adjust dosages.  See Isabell around 5/22/24  See discussion in HPI above.    I think the hernia at the incision site where the gallbladder surgery was done can probably be repaired at a later time electively and does not appear to be emergency at this point.    She did have cataract surgery in January 2024 and is due to have some follow-up procedure done as part of that management.  She does have some blurriness of her vision.  I recommended that she defer the additional cataract procedure if feasible in the near future until we get her off of the chemotherapy and steroids and her other medications as these medicines may be contributing to her visual symptoms.    Hemoglobin is 8.7 and ferritin is 21.  She has iron  deficiency anemia.  She did not tolerate oral iron supplementation.  Have discussed with her iron infusion.  I would plan to give her injectafer weekly x2 weeks.  Will premedicate her with Benadryl to reduce the risk of reaction to iron infusion.  I would estimate a 1-2% risk of reaction to iron infusion.  The iron infusion will be given at the [facility] outpatient Department clinic.  Iron administration should facilitate red blood cell production here and improve her hemoglobin up to or close to the normal range and should improve her energy level.    She follows up with myself in 2 weeks.                            +

## 2024-05-15 NOTE — TELEPHONE ENCOUNTER
She has Fe deficiency anemia.    I placed order for injectafer x's 2 weeks.  Benadryl premed.  At [facility] out pt dept.    Get auth    Get date and time.  I was thinking around 5/23/24.

## 2024-05-16 ENCOUNTER — INFUSION (OUTPATIENT)
Dept: INFUSION THERAPY | Facility: HOSPITAL | Age: 74
End: 2024-05-16
Attending: INTERNAL MEDICINE
Payer: MEDICARE

## 2024-05-16 VITALS
WEIGHT: 144.63 LBS | DIASTOLIC BLOOD PRESSURE: 64 MMHG | SYSTOLIC BLOOD PRESSURE: 107 MMHG | BODY MASS INDEX: 24.1 KG/M2 | HEIGHT: 65 IN | HEART RATE: 83 BPM | TEMPERATURE: 98 F | RESPIRATION RATE: 18 BRPM

## 2024-05-16 DIAGNOSIS — D70.1 CHEMOTHERAPY INDUCED NEUTROPENIA: ICD-10-CM

## 2024-05-16 DIAGNOSIS — C50.911 INVASIVE DUCTAL CARCINOMA OF BREAST, FEMALE, RIGHT: Primary | ICD-10-CM

## 2024-05-16 DIAGNOSIS — T45.1X5A CHEMOTHERAPY INDUCED NEUTROPENIA: ICD-10-CM

## 2024-05-16 PROCEDURE — A4216 STERILE WATER/SALINE, 10 ML: HCPCS | Performed by: INTERNAL MEDICINE

## 2024-05-16 PROCEDURE — 63600175 PHARM REV CODE 636 W HCPCS: Mod: JZ,JG | Performed by: INTERNAL MEDICINE

## 2024-05-16 PROCEDURE — 96372 THER/PROPH/DIAG INJ SC/IM: CPT | Mod: 59

## 2024-05-16 PROCEDURE — 96365 THER/PROPH/DIAG IV INF INIT: CPT

## 2024-05-16 PROCEDURE — 25000003 PHARM REV CODE 250: Performed by: INTERNAL MEDICINE

## 2024-05-16 RX ORDER — SODIUM CHLORIDE 0.9 % (FLUSH) 0.9 %
10 SYRINGE (ML) INJECTION
Status: DISCONTINUED | OUTPATIENT
Start: 2024-05-16 | End: 2024-05-16 | Stop reason: HOSPADM

## 2024-05-16 RX ORDER — HEPARIN 100 UNIT/ML
500 SYRINGE INTRAVENOUS
Status: COMPLETED | OUTPATIENT
Start: 2024-05-16 | End: 2024-05-16

## 2024-05-16 RX ADMIN — PEGFILGRASTIM 6 MG: 6 INJECTION SUBCUTANEOUS at 01:05

## 2024-05-16 RX ADMIN — HEPARIN 500 UNITS: 100 SYRINGE at 02:05

## 2024-05-16 RX ADMIN — ONDANSETRON HYDROCHLORIDE 16 MG: 2 INJECTION, SOLUTION INTRAMUSCULAR; INTRAVENOUS at 01:05

## 2024-05-16 RX ADMIN — SODIUM CHLORIDE, PRESERVATIVE FREE 10 ML: 5 INJECTION INTRAVENOUS at 02:05

## 2024-05-16 NOTE — PLAN OF CARE
Problem: Nausea and Vomiting  Goal: Nausea and Vomiting Relief  5/16/2024 1343 by Gogo Olivas, RN  Outcome: Met  5/16/2024 1343 by Gogo Olivas, RN  Outcome: Progressing

## 2024-05-21 ENCOUNTER — TELEPHONE (OUTPATIENT)
Facility: CLINIC | Age: 74
End: 2024-05-21
Payer: MEDICARE

## 2024-05-21 RX ORDER — DIPHENHYDRAMINE HYDROCHLORIDE 50 MG/ML
25 INJECTION INTRAMUSCULAR; INTRAVENOUS
Start: 2024-05-21

## 2024-05-21 RX ORDER — EPINEPHRINE 0.3 MG/.3ML
0.3 INJECTION SUBCUTANEOUS ONCE AS NEEDED
OUTPATIENT
Start: 2024-05-21

## 2024-05-21 RX ORDER — SODIUM CHLORIDE 0.9 % (FLUSH) 0.9 %
10 SYRINGE (ML) INJECTION
OUTPATIENT
Start: 2024-05-21

## 2024-05-21 RX ORDER — HEPARIN 100 UNIT/ML
500 SYRINGE INTRAVENOUS
OUTPATIENT
Start: 2024-05-21

## 2024-05-21 RX ORDER — DIPHENHYDRAMINE HYDROCHLORIDE 50 MG/ML
50 INJECTION INTRAMUSCULAR; INTRAVENOUS ONCE AS NEEDED
OUTPATIENT
Start: 2024-05-21

## 2024-05-22 ENCOUNTER — OFFICE VISIT (OUTPATIENT)
Facility: CLINIC | Age: 74
End: 2024-05-22
Payer: MEDICARE

## 2024-05-22 VITALS
RESPIRATION RATE: 17 BRPM | WEIGHT: 137.19 LBS | BODY MASS INDEX: 22.86 KG/M2 | SYSTOLIC BLOOD PRESSURE: 118 MMHG | DIASTOLIC BLOOD PRESSURE: 79 MMHG | TEMPERATURE: 98 F | HEART RATE: 105 BPM | HEIGHT: 65 IN

## 2024-05-22 DIAGNOSIS — C50.911 INVASIVE DUCTAL CARCINOMA OF BREAST, FEMALE, RIGHT: Primary | ICD-10-CM

## 2024-05-22 DIAGNOSIS — T45.1X5A CHEMOTHERAPY INDUCED NEUTROPENIA: ICD-10-CM

## 2024-05-22 DIAGNOSIS — K52.1 DIARRHEA DUE TO DRUG: ICD-10-CM

## 2024-05-22 DIAGNOSIS — D50.8 IRON DEFICIENCY ANEMIA SECONDARY TO INADEQUATE DIETARY IRON INTAKE: ICD-10-CM

## 2024-05-22 DIAGNOSIS — D70.1 CHEMOTHERAPY INDUCED NEUTROPENIA: ICD-10-CM

## 2024-05-22 PROCEDURE — 1159F MED LIST DOCD IN RCRD: CPT | Mod: CPTII,S$GLB,, | Performed by: NURSE PRACTITIONER

## 2024-05-22 PROCEDURE — 3078F DIAST BP <80 MM HG: CPT | Mod: CPTII,S$GLB,, | Performed by: NURSE PRACTITIONER

## 2024-05-22 PROCEDURE — 1126F AMNT PAIN NOTED NONE PRSNT: CPT | Mod: CPTII,S$GLB,, | Performed by: NURSE PRACTITIONER

## 2024-05-22 PROCEDURE — 99215 OFFICE O/P EST HI 40 MIN: CPT | Mod: S$GLB,,, | Performed by: NURSE PRACTITIONER

## 2024-05-22 PROCEDURE — 3008F BODY MASS INDEX DOCD: CPT | Mod: CPTII,S$GLB,, | Performed by: NURSE PRACTITIONER

## 2024-05-22 PROCEDURE — 3074F SYST BP LT 130 MM HG: CPT | Mod: CPTII,S$GLB,, | Performed by: NURSE PRACTITIONER

## 2024-05-22 PROCEDURE — 3288F FALL RISK ASSESSMENT DOCD: CPT | Mod: CPTII,S$GLB,, | Performed by: NURSE PRACTITIONER

## 2024-05-22 PROCEDURE — 99999 PR PBB SHADOW E&M-EST. PATIENT-LVL III: CPT | Mod: PBBFAC,,, | Performed by: NURSE PRACTITIONER

## 2024-05-22 PROCEDURE — G2211 COMPLEX E/M VISIT ADD ON: HCPCS | Mod: S$GLB,,, | Performed by: NURSE PRACTITIONER

## 2024-05-22 PROCEDURE — 1101F PT FALLS ASSESS-DOCD LE1/YR: CPT | Mod: CPTII,S$GLB,, | Performed by: NURSE PRACTITIONER

## 2024-05-22 NOTE — PROGRESS NOTES
[facility] HEMATOLGY ONCOLOGY CONSULTATION    Subjective:       Patient ID: Dora Atwood is a 74 y.o. female returning today for a regularly scheduled follow-up visit.    Chief Complaint: Breast Cancer/ decreased appetite      HPI  The patient is here today with her .   She has been getting TCHP and has had 3 cycles of chemotherapy thus far. Her last treatment was week.   She has had a lot of side effects, but she states they are tolerable.     Dr. Brooks did adjust her dosages and she states that she does feel it will help.   Her diarrhea has been a bit more manageable with the lomotil.     She has some neuropathy on her left foot.         Past Medical History:   Diagnosis Date    Breast cancer 01/01/2024       Past Surgical History:   Procedure Laterality Date    CATARACT EXTRACTION Bilateral 01/2024    CHOLECYSTECTOMY  2022    INSERTION OF TUNNELED CENTRAL VENOUS CATHETER (CVC) WITH SUBCUTANEOUS PORT Right 3/19/2024    Procedure: INSERTION, PORT-A-CATH;  Surgeon: Waleska Treviño MD;  Location: [facility];  Service: General;  Laterality: Right;    TUBAL LIGATION  1981       Social History     Socioeconomic History    Marital status:    Tobacco Use    Smoking status: Never    Smokeless tobacco: Never   Substance and Sexual Activity    Alcohol use: Never    Drug use: Never     Social Determinants of Health     Financial Resource Strain: Low Risk  (3/21/2024)    Overall Financial Resource Strain (CARDIA)     Difficulty of Paying Living Expenses: Not hard at all   Food Insecurity: No Food Insecurity (3/21/2024)    Hunger Vital Sign     Worried About Running Out of Food in the Last Year: Never true     Ran Out of Food in the Last Year: Never true   Transportation Needs: No Transportation Needs (3/21/2024)    PRAPARE - Transportation     Lack of Transportation (Medical): No     Lack of Transportation (Non-Medical): No   Physical Activity: Sufficiently Active (3/21/2024)    Exercise Vital Sign     Days of  Exercise per Week: 6 days     Minutes of Exercise per Session: 30 min   Stress: Stress Concern Present (3/21/2024)    [employer] - Occupational Stress Questionnaire     Feeling of Stress : To some extent   Housing Stability: Low Risk  (3/21/2024)    Housing Stability Vital Sign     Unable to Pay for Housing in the Last Year: No     Number of Places Lived in the Last Year: 1     Unstable Housing in the Last Year: No       Family History   Problem Relation Name Age of Onset    Rectal cancer Mother      Breast cancer Maternal Aunt         Review of patient's allergies indicates:   Allergen Reactions    Carboplatin Other (See Comments)     Chest tightness and flushing       Current Outpatient Medications:     dexAMETHasone (DECADRON) 4 MG Tab, Take 8mg the AM and PM the day before chemo and 2 days after chemo, Disp: 90 tablet, Rfl: 1    diphenoxylate-atropine 2.5-0.025 mg (LOMOTIL) 2.5-0.025 mg per tablet, Take 1 tablet by mouth 4 (four) times daily as needed for Diarrhea., Disp: 30 tablet, Rfl: 1    duke's soln (benadryl 30 mL, mylanta 30 mL, LIDOcaine 30 mL, nystatin 30 mL) 120mL, Take 10 mLs by mouth 4 (four) times daily., Disp: 240 mL, Rfl: 1    EScitalopram oxalate (LEXAPRO) 5 MG Tab, Take 1 tablet (5 mg total) by mouth once daily., Disp: 90 tablet, Rfl: 3    ferrous gluconate (FERGON) 324 MG tablet, Take 324 mg by mouth daily with breakfast., Disp: , Rfl:     HYDROcodone-acetaminophen (NORCO) 5-325 mg per tablet, Take 1 tablet by mouth every 6 (six) hours as needed for Pain., Disp: 30 tablet, Rfl: 0    HYDROcodone-acetaminophen (NORCO) 5-325 mg per tablet, Take 1 tablet by mouth every 8 (eight) hours as needed for Pain., Disp: 12 tablet, Rfl: 0    HYDROcodone-acetaminophen (NORCO) 5-325 mg per tablet, Take 1 tablet by mouth every 8 (eight) hours as needed for Pain., Disp: 12 tablet, Rfl: 0    magnesium 30 mg Tab, Take 1 tablet by mouth once daily., Disp: , Rfl:     multivitamin  "(THERAGRAN) per tablet, Take 1 tablet by mouth once daily., Disp: , Rfl:     ondansetron (ZOFRAN) 8 MG tablet, Take 1 tablet (8 mg total) by mouth every 8 (eight) hours as needed for Nausea., Disp: 30 tablet, Rfl: 2    pot bicarb/potassium cit/ca (POTASSIUM BICARBONATE ORAL), Take 1 tablet by mouth once daily. OTC, Disp: , Rfl:     promethazine (PHENERGAN) 25 MG tablet, Take 1 tablet (25 mg total) by mouth every 4 to 6 hours as needed for Nausea., Disp: 30 tablet, Rfl: 2    scopolamine (TRANSDERM-SCOP) 1.3-1.5 mg (1 mg over 3 days), Place 1 patch onto the skin every 72 hours., Disp: 4 patch, Rfl: 4    All medications and past history have been reviewed.    Review of Systems   Constitutional:  Negative for appetite change and unexpected weight change.   HENT:  Negative for mouth sores.    Eyes:  Positive for visual disturbance.   Respiratory:  Negative for cough and shortness of breath.    Cardiovascular:  Negative for chest pain.   Gastrointestinal:  Negative for abdominal pain and diarrhea.   Genitourinary:  Negative for frequency.   Musculoskeletal:  Positive for back pain.   Skin:  Negative for rash.   Neurological:  Negative for headaches.   Hematological:  Negative for adenopathy.   Psychiatric/Behavioral:  The patient is not nervous/anxious.        Objective:        /79 (BP Location: Left arm)   Pulse 105   Temp 97.8 °F (36.6 °C)   Resp 17   Ht 5' 5" (1.651 m)   Wt 62.2 kg (137 lb 3.2 oz)   BMI 22.83 kg/m²     Physical Exam  Constitutional:       Appearance: Normal appearance.   HENT:      Head: Normocephalic and atraumatic.      Mouth/Throat:      Mouth: Mucous membranes are moist.   Cardiovascular:      Rate and Rhythm: Normal rate and regular rhythm.      Pulses: Normal pulses.      Heart sounds: Normal heart sounds.   Pulmonary:      Effort: Pulmonary effort is normal. No respiratory distress.      Breath sounds: Normal breath sounds. No wheezing.   Abdominal:      General: There is no " distension.      Palpations: Abdomen is soft. There is no mass.      Tenderness: There is no abdominal tenderness.   Musculoskeletal:         General: No swelling. Normal range of motion.      Right lower leg: No edema.      Left lower leg: No edema.   Skin:     General: Skin is warm and dry.      Capillary Refill: Capillary refill takes 2 to 3 seconds.      Findings: No bruising or rash.   Neurological:      Mental Status: She is alert and oriented to person, place, and time. Mental status is at baseline.      Motor: No weakness.   Psychiatric:         Mood and Affect: Mood normal.         Behavior: Behavior normal.           Lab:      MyChart Results Release    MyChart Status: Active  Results Release             suggestion  Result Information displayed in this report will not trend and may not trigger automated decision support.      Contains abnormal data CBC W/ AUTO DIFFERENTIAL  Order: [id number]   Ref Range & Units 2 d ago   WBC 4.8 - 10.8 iglesia/L 3.7 Low    RBC 4.50 - 5.50 tril/L 3.00 Low    Hemoglobin 12.0 - 15.0 g/dL 9.2 Low    Hematocrit 37.0 - 47.0 % 27.7 Low    MCV 81 - 97 fL 92   MCH 27 - 32 pg 31   MCHC 32 - 36 g/dL 33   RDW 11.5 - 14.5 % 15.0 High    Platelets 150 - 400 iglesia/L 168   MPV 7.4 - 10.4 fL 7.4   Granulocytes % 42.2 - 75.2 % 88.2 High    Lymphocytes % 20.5 - 51.1 % 8.8 Low    Monocytes % 1.7 - 9.3 % 1.4 Low    Eosinophils % 0.0 - <5.0 % 0.8   Basophils % 0.0 - <5.0 % 0.8   Granulocytes Absolute 1.4 - 6.5 K/UL 3.3   Lymphocytes, Absolute 1.2 - 3.4 K/uL 0.3 Low    Monocyte Absolute 0.11 - 0.59 K/uL 0.10 Low    Eosinophils, Absolute 0.00 - 0.70 K/UL 0.00   Basophils, Absolute 0.00 - 0.20 K/UL 0.00   ANC 1400 - 6500 /uL 3,300   nRBC% 0 - 0 /100 0   Resulting Agency  [facility]      Ref Range & Units 2 d ago   Sodium 136 - 145 mmol/L 136   Potassium 3.4 - 4.5 mmol/L 3.7   Chloride 98 - 107 mmol/L 103   CO2 21 - 31 mmol/L 28   BUN 7 - 25 mg/dL 10   Creatinine 0.60 - 1.30 mg/dL 0.59 Low    Glucose 74  - 109 mg/dL 108   Albumin 3.5 - 5.2 g/dL 3.4 Low    AST 13 - 39 U/L 10 Low    ALT 7 - 52 U/L 7   Alkaline Phosphatase 34 - 104 U/L 88   Total Bilirubin 0.3 - 1.0 mg/dL 0.7   Protein Total 6.4 - 8.9 g/dL 5.7 Low    Anion Gap 3 - 15 mmol/L 5   Albumin/Globulin Ratio 1.0 - 2.0 1.5   Globulin 2.0 - 4.0 g/dL 2.3   Corrected Calcium 8.6 - 10.3 mg/dL 8.9   Osmolality Calc 275 - 295 mOsmol/kg 272 Low    eGFR >=60 mL/min 95   Resulting Agency  [facility]     No results found for this or any previous visit (from the past 336 hour(s)).  CMP  Sodium   Date Value Ref Range Status   03/14/2024 142 136 - 145 mmol/L Final     Potassium   Date Value Ref Range Status   03/14/2024 3.8 3.5 - 5.1 mmol/L Final     Chloride   Date Value Ref Range Status   03/14/2024 105 95 - 110 mmol/L Final     CO2   Date Value Ref Range Status   03/14/2024 32 (H) 23 - 29 mmol/L Final     Glucose   Date Value Ref Range Status   03/14/2024 116 (H) 70 - 110 mg/dL Final     BUN   Date Value Ref Range Status   03/14/2024 16 8 - 23 mg/dL Final     Creatinine   Date Value Ref Range Status   03/15/2024 0.7 0.5 - 1.4 mg/dL Final   03/15/2024 0.7 0.5 - 1.4 mg/dL Final     Calcium   Date Value Ref Range Status   03/14/2024 9.0 8.7 - 10.5 mg/dL Final     Anion Gap   Date Value Ref Range Status   03/14/2024 5 (L) 8 - 16 mmol/L Final         Specimen (24h ago, onward)      None              Radiology/Diagnostic Studies:  Results for orders placed or performed during the hospital encounter of 03/06/24 (from the past 2160 hour(s))   NM PET CT FDG Skull Base to Mid Thigh    Impression    1. FDG avid right breast nodule compatible with patient's history of breast carcinoma.  2. FDG avid right supraclavicular, right infraclavicular, right internal mammary chain and right axillary metastatic lymphadenopathy.  3. No FDG avid malignancy below the diaphragm      Electronically signed by: Neo Lin MD  Date:    03/06/2024  Time:    16:37     Results for orders placed or  performed during the hospital encounter of 03/15/24 (from the past 2160 hour(s))   CT Head W Wo Contrast    Narrative    CMS MANDATED QUALITY DATA - CT RADIATION 436    All CT scans at this facility utilize dose modulation, iterative reconstruction, and/or weight based dosing when appropriate to reduce radiation dose to as low as reasonably achievable.    CT of the brain with and without contrast    HISTORY: Breast carcinoma.    The examination is performed before and following intravenous administration of 50 mL Omnipaque 350.    The brain parenchyma appears unremarkable. There are no findings of acute hemorrhage or infarction. There is no evidence of intra-axial mass or significant mass effect. There is no midline shift.    The ventricular system is appropriate in size and position. There are no extra-axial collections.    Calcified plaque formation is observed within the intracranial segments of the carotid and vertebral arteries    Following contrast administration, no pathologic enhancement is observed.    No osseous abnormality is identified. The visualized paranasal sinuses, mastoid air cells and middle ear cavities are appropriately aerated.    IMPRESSION:    No significant abnormality.    Electronically signed by:  Taylor Kenney MD  03/15/2024 01:45 PM CDT Workstation: [id number]         All lab results and imaging results have been reviewed and discussed with the patient.   Assessment/Plan:       1. Invasive ductal carcinoma of breast, female, right    2. Chemotherapy induced neutropenia    3. Iron deficiency anemia secondary to inadequate dietary iron intake    4. Diarrhea due to drug      Invasive ductal carcinoma of breast, female, right    Chemotherapy induced neutropenia    Iron deficiency anemia secondary to inadequate dietary iron intake    Diarrhea due to drug          PLAN:   Post cycle 3 of TCHP, proceed with cycle 4   Continue labs weekly   Continue anti diarrhea medications   Encouraged exercise  to help with fatigue   Continue IV iron plan to supplement       Follow up in about 2 weeks (around 6/5/2024) for me and 4 weeks with Dr. Brooks .     I have explained and the patient understands all of  the current recommendation(s). I have answered all of their questions to the best of my ability and to their complete satisfaction.   The patient is to continue with the current management plan.            Electronically signed by: Isabell Gayle, MSN, APRN, AGNP-C

## 2024-06-05 ENCOUNTER — INFUSION (OUTPATIENT)
Dept: INFUSION THERAPY | Facility: HOSPITAL | Age: 74
End: 2024-06-05
Attending: INTERNAL MEDICINE
Payer: MEDICARE

## 2024-06-05 ENCOUNTER — OFFICE VISIT (OUTPATIENT)
Facility: CLINIC | Age: 74
End: 2024-06-05
Payer: MEDICARE

## 2024-06-05 VITALS
TEMPERATURE: 98 F | RESPIRATION RATE: 16 BRPM | WEIGHT: 138.81 LBS | HEART RATE: 75 BPM | SYSTOLIC BLOOD PRESSURE: 129 MMHG | HEIGHT: 65 IN | BODY MASS INDEX: 23.13 KG/M2 | DIASTOLIC BLOOD PRESSURE: 77 MMHG

## 2024-06-05 VITALS
RESPIRATION RATE: 16 BRPM | WEIGHT: 138.5 LBS | SYSTOLIC BLOOD PRESSURE: 133 MMHG | DIASTOLIC BLOOD PRESSURE: 72 MMHG | HEIGHT: 65 IN | BODY MASS INDEX: 23.07 KG/M2 | HEART RATE: 102 BPM

## 2024-06-05 DIAGNOSIS — T45.1X5A CHEMOTHERAPY INDUCED NEUTROPENIA: ICD-10-CM

## 2024-06-05 DIAGNOSIS — C50.911 INVASIVE DUCTAL CARCINOMA OF BREAST, FEMALE, RIGHT: Primary | ICD-10-CM

## 2024-06-05 DIAGNOSIS — R63.0 DECREASED APPETITE: ICD-10-CM

## 2024-06-05 DIAGNOSIS — D70.1 CHEMOTHERAPY INDUCED NEUTROPENIA: ICD-10-CM

## 2024-06-05 DIAGNOSIS — D50.8 IRON DEFICIENCY ANEMIA SECONDARY TO INADEQUATE DIETARY IRON INTAKE: ICD-10-CM

## 2024-06-05 PROCEDURE — 96413 CHEMO IV INFUSION 1 HR: CPT

## 2024-06-05 PROCEDURE — 1101F PT FALLS ASSESS-DOCD LE1/YR: CPT | Mod: CPTII,S$GLB,, | Performed by: NURSE PRACTITIONER

## 2024-06-05 PROCEDURE — 96417 CHEMO IV INFUS EACH ADDL SEQ: CPT

## 2024-06-05 PROCEDURE — 63600175 PHARM REV CODE 636 W HCPCS: Performed by: INTERNAL MEDICINE

## 2024-06-05 PROCEDURE — A4216 STERILE WATER/SALINE, 10 ML: HCPCS | Performed by: INTERNAL MEDICINE

## 2024-06-05 PROCEDURE — 3078F DIAST BP <80 MM HG: CPT | Mod: CPTII,S$GLB,, | Performed by: NURSE PRACTITIONER

## 2024-06-05 PROCEDURE — G2211 COMPLEX E/M VISIT ADD ON: HCPCS | Mod: S$GLB,,, | Performed by: NURSE PRACTITIONER

## 2024-06-05 PROCEDURE — 96367 TX/PROPH/DG ADDL SEQ IV INF: CPT

## 2024-06-05 PROCEDURE — 3008F BODY MASS INDEX DOCD: CPT | Mod: CPTII,S$GLB,, | Performed by: NURSE PRACTITIONER

## 2024-06-05 PROCEDURE — 3075F SYST BP GE 130 - 139MM HG: CPT | Mod: CPTII,S$GLB,, | Performed by: NURSE PRACTITIONER

## 2024-06-05 PROCEDURE — 99999 PR PBB SHADOW E&M-EST. PATIENT-LVL III: CPT | Mod: PBBFAC,,, | Performed by: NURSE PRACTITIONER

## 2024-06-05 PROCEDURE — 3288F FALL RISK ASSESSMENT DOCD: CPT | Mod: CPTII,S$GLB,, | Performed by: NURSE PRACTITIONER

## 2024-06-05 PROCEDURE — 25000003 PHARM REV CODE 250: Performed by: INTERNAL MEDICINE

## 2024-06-05 PROCEDURE — 1159F MED LIST DOCD IN RCRD: CPT | Mod: CPTII,S$GLB,, | Performed by: NURSE PRACTITIONER

## 2024-06-05 PROCEDURE — 99215 OFFICE O/P EST HI 40 MIN: CPT | Mod: S$GLB,,, | Performed by: NURSE PRACTITIONER

## 2024-06-05 PROCEDURE — 1126F AMNT PAIN NOTED NONE PRSNT: CPT | Mod: CPTII,S$GLB,, | Performed by: NURSE PRACTITIONER

## 2024-06-05 PROCEDURE — 96375 TX/PRO/DX INJ NEW DRUG ADDON: CPT

## 2024-06-05 RX ORDER — PROCHLORPERAZINE EDISYLATE 5 MG/ML
5 INJECTION INTRAMUSCULAR; INTRAVENOUS ONCE AS NEEDED
Status: CANCELLED | OUTPATIENT
Start: 2024-06-05

## 2024-06-05 RX ORDER — DIPHENHYDRAMINE HYDROCHLORIDE 50 MG/ML
50 INJECTION INTRAMUSCULAR; INTRAVENOUS ONCE AS NEEDED
Status: CANCELLED | OUTPATIENT
Start: 2024-06-05

## 2024-06-05 RX ORDER — PROCHLORPERAZINE EDISYLATE 5 MG/ML
5 INJECTION INTRAMUSCULAR; INTRAVENOUS ONCE AS NEEDED
Status: DISCONTINUED | OUTPATIENT
Start: 2024-06-05 | End: 2024-06-05 | Stop reason: HOSPADM

## 2024-06-05 RX ORDER — DIPHENHYDRAMINE HYDROCHLORIDE 50 MG/ML
50 INJECTION INTRAMUSCULAR; INTRAVENOUS ONCE AS NEEDED
Status: DISCONTINUED | OUTPATIENT
Start: 2024-06-05 | End: 2024-06-05 | Stop reason: HOSPADM

## 2024-06-05 RX ORDER — MEPERIDINE HYDROCHLORIDE 25 MG/ML
25 INJECTION INTRAMUSCULAR; INTRAVENOUS; SUBCUTANEOUS ONCE AS NEEDED
Status: DISCONTINUED | OUTPATIENT
Start: 2024-06-05 | End: 2024-06-05 | Stop reason: HOSPADM

## 2024-06-05 RX ORDER — SODIUM CHLORIDE 0.9 % (FLUSH) 0.9 %
10 SYRINGE (ML) INJECTION
Status: CANCELLED | OUTPATIENT
Start: 2024-06-05

## 2024-06-05 RX ORDER — HEPARIN 100 UNIT/ML
500 SYRINGE INTRAVENOUS
Status: DISCONTINUED | OUTPATIENT
Start: 2024-06-05 | End: 2024-06-05 | Stop reason: HOSPADM

## 2024-06-05 RX ORDER — HEPARIN 100 UNIT/ML
500 SYRINGE INTRAVENOUS
Status: CANCELLED
Start: 2024-06-06

## 2024-06-05 RX ORDER — SODIUM CHLORIDE 0.9 % (FLUSH) 0.9 %
10 SYRINGE (ML) INJECTION
Status: CANCELLED
Start: 2024-06-06

## 2024-06-05 RX ORDER — SODIUM CHLORIDE 0.9 % (FLUSH) 0.9 %
10 SYRINGE (ML) INJECTION
Status: DISCONTINUED | OUTPATIENT
Start: 2024-06-05 | End: 2024-06-05 | Stop reason: HOSPADM

## 2024-06-05 RX ORDER — MEPERIDINE HYDROCHLORIDE 50 MG/ML
25 INJECTION INTRAMUSCULAR; INTRAVENOUS; SUBCUTANEOUS ONCE AS NEEDED
Status: CANCELLED | OUTPATIENT
Start: 2024-06-05

## 2024-06-05 RX ORDER — EPINEPHRINE 0.3 MG/.3ML
0.3 INJECTION SUBCUTANEOUS ONCE AS NEEDED
Status: DISCONTINUED | OUTPATIENT
Start: 2024-06-05 | End: 2024-06-05 | Stop reason: HOSPADM

## 2024-06-05 RX ORDER — EPINEPHRINE 0.3 MG/.3ML
0.3 INJECTION SUBCUTANEOUS ONCE AS NEEDED
Status: CANCELLED | OUTPATIENT
Start: 2024-06-05

## 2024-06-05 RX ORDER — HEPARIN 100 UNIT/ML
500 SYRINGE INTRAVENOUS
Status: CANCELLED | OUTPATIENT
Start: 2024-06-05

## 2024-06-05 RX ADMIN — CYCLOPHOSPHAMIDE 1040 MG: 2 INJECTION INTRAVENOUS at 12:06

## 2024-06-05 RX ADMIN — PALONOSETRON HYDROCHLORIDE 0.25 MG: 0.25 INJECTION INTRAVENOUS at 11:06

## 2024-06-05 RX ADMIN — SODIUM CHLORIDE, PRESERVATIVE FREE 10 ML: 5 INJECTION INTRAVENOUS at 02:06

## 2024-06-05 RX ADMIN — APREPITANT 130 MG: 130 INJECTION, EMULSION INTRAVENOUS at 11:06

## 2024-06-05 RX ADMIN — TRASTUZUMAB-ANNS 390 MG: 420 INJECTION, POWDER, LYOPHILIZED, FOR SOLUTION INTRAVENOUS at 11:06

## 2024-06-05 RX ADMIN — PERTUZUMAB 420 MG: 30 INJECTION, SOLUTION, CONCENTRATE INTRAVENOUS at 10:06

## 2024-06-05 RX ADMIN — SODIUM CHLORIDE 130 MG: 9 INJECTION, SOLUTION INTRAVENOUS at 01:06

## 2024-06-05 RX ADMIN — HEPARIN 500 UNITS: 100 SYRINGE at 02:06

## 2024-06-05 NOTE — PLAN OF CARE
Problem: Fatigue  Goal: Improved Activity Tolerance  Outcome: Progressing  Intervention: Promote Improved Energy  Flowsheets (Taken 6/5/2024 1037)  Fatigue Management: frequent rest breaks encouraged  Sleep/Rest Enhancement: regular sleep/rest pattern promoted  Environmental Support: environmental consistency promoted

## 2024-06-05 NOTE — PROGRESS NOTES
[facility] Hematology Oncology Subsequent  encounter note    5/15/24    Subjective:      Patient ID:   Dora Atwood  74 y.o. female  1950  Waleska Plasencia, Javier Villanueva MD's      Chief Complaint:   R breast Cancer    HPI:   74 y.o. female palpated a hard lump at the area of the tail of the right breast and the anterior right axilla.  The mass was hard, large, but movable and not fixed.  Dr. Meza, her PMD ordered a bilateral diagnostic mammogram and ultrasound.      She has inversion of the left and right nipple, she says this is a chronic finding and her breasts have always been with the inversion.  Pleomorphic microcalcifications extending from the upper central to the upper outer quadrant of the right breast are noted and cover an area of approximately 4 x 8 cm, these are new from prior studies.  The left breast shows no masses or microcalcifications.  There is a large mass at the right axilla measuring 4.5 cm in diameter.      Ultrasound of right breast and axilla show a 1 cm mass 3 cm from the nipple at the 9 o'clock position.  In the skin of the lateral and inferior right breast there are numerous small hypoechoic nodules on the order of 3-6 mm in size.  Ultrasound of the axilla shows a large mass with irregular borders at 3 x 4.8 cm.  Just lateral to this mass is a smaller mass at 3 x 2 cm.  The radiologist recommends biopsy of the nodule at 9:00 a.m. and the large right axillary mass presumed to be a metastatic lymph node.  He notes if the biopsy of the right breast nodule fails to demonstrate concordant histologic diagnosis, then stereotactic biopsy of the microcalcifications can be performed.    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  Focal ductal carcinoma insight 2, intermediate grade, solid pattern was seen.  ERP was negative, PRP was negative, HER2 Lisa was 3+  positive    Biopsy the right axilla lymph node returned positive for metastatic carcinoma.  Residual lymphoid architecture is not seen.    She had onset of menses at age 14.  She is a  2 para 2 miscarriage 0 individual.  She delivered her 1st child at the maternal age of 25 and the 2nd child at the maternal age of 30.  She took birth control pills for approximately 10 years.  She had bilateral tubal ligation at age 30.  She went through the menopause in her mid 40s and she did take hormone replacement therapy for approximately 10 years.      Mom had rectal cancer, dad had diabetes and heart disease.  A paternal aunt had breast cancer, maternal uncle  in his 20s of some type of cancer.  A brother has heart disease.  Another brother  of an overdose, and a 3rd brother  of unknown cause.  She has a sister that she believes may have type of cancer and another sister with diabetes.  Her son and daughter are alive and well.  There is no family history known to her of ovarian, prostate, pancreas, or melanoma cancers.      CAT of head negative for metastatic dx.  ECHO Ej Fx 65-68%  MRI of breast done 3/21/24.  I spoke with Radiology, they are awaiting mamm from , should have a report by 14 days, 24.  PET uptake in mass, R supraclavicular LN, infraclavicular LN, internal mammary LN, R axillary LN, and mild increase in FGD  with skin thickening and edema.    This week, tast is improved, BM NL.  Next Rx 5/15/24, adjusted dosages.  PN at L hand resolved.  Energy 8/10  Cataract surgery 2024.    Today May 15th she is much improved and in better spirits.  Taste has returned to normal at this time and appetite has recovered, she actually gets hungry.  Her BMI is formed.  Today is day 1 of her 3rd cycle of chemotherapy.  And she does expect that her symptoms will recur later this weekend.  She does have her scopolamine patch in place, without nausea today, she did not have to take Zofran or Phenergan today.   She is going to take the Lomotil on Saturday x3 or 4 days preventively and then will take the Lomotil again if diarrhea symptoms recur.    Now on exam the anterior axillary lymph node is not palpable and the breast mass laterally is not palpable after 2 cycles of chemotherapy.  She did she is developing a small hernia at the site of her previous gallbladder surgery.  The hernia is nontender and easily reducible.    Her hemoglobin is 8.7 and her ferritin returned low at 21.  She could not tolerate oral iron because of GI symptoms.  She does agree to injected for or iron infusion weekly at the [facility] outpatient [facility].  There is a 1 or 2% risk of reaction to iron infusion.  Will give her Benadryl as a premedication and will observe her for 1 hour after each infusion.  Iron replenishment here will facilitate red blood cell production and improve her hemoglobin and should improve her energy.    She is to see Isabell the nurse practitioner next week and I will see her in 2 weeks to see how she is doing.      ROS:   GEN: normal without any fever, night sweats or weight loss  HEENT: normal with no HA's, sore throat, stiff neck, changes in vision  CV: normal with no CP, SOB, PND, CARNEY or orthopnea  PULM: normal with no SOB, cough, hemoptysis, sputum or pleuritic pain  GI: normal with no abdominal pain, nausea, vomiting, constipation, diarrhea, melanotic stools, BRBPR, or hematemesis  : normal with no hematuria, dysuria  BREAST: See HPI  SKIN: See HPI     Hx BTL, cholecystectomy.    Review of patient's allergies indicates:   Allergen Reactions    Carboplatin Other (See Comments)     Chest tightness and flushing       No meds.    Objective:   Vitals:  There were no vitals taken for this visit.    Physical Examination:   GEN: no apparent distress, comfortable  HEAD: atraumatic and normocephalic  EYES: no pallor, no icterus  ENT: no pharyngeal erythema, external ears WNL; no nasal discharge  NECK: no  masses, thyroid normal, trachea midline, no LAD/LN's, supple  CV: RRR with no murmur; normal pulse  CHEST: Normal respiratory effort; CTAB; normal breath sounds; no wheeze or crackles  ABDOM: nontender and nondistended; soft; normal bowel sounds; no rebound/guarding  MUSC/Skeletal: ROM normal; no crepitus; joints normal; no deformities or arthropathy  EXTREM: no clubbing, cyanosis, inflammation or swelling  SKIN: no rashes, lesions, ulcers, petechiae or subcutaneous nodules  : no cvat  NEURO: grossly intact; motor/sensory WNL; no tremors  PSYCH: normal mood, affect and behavior  LYMPH: normal cervical, supraclavicular, and groin LN's  BREASTS:  Left breast is nontender, without discharge, and without palpable mass, left axilla is without palpable mass or lymphadenopathy.  Right breast has edema noted as compared to the right.  She does not have peau de orange change, and skin is intact.  I do not palpate the 1 cm tumor at the 9 o'clock position of the right breast.  She has a golf ball sized mass at the tail of the breast and anterior axillary area that is hard and movable, not fixed.  I do not palpate the 2nd lymph node in the right axilla as referenced on the radiology studies.    CBC, CMP, breast tumor markers will be ordered.  Echocardiogram for ejection fraction will be ordered.  MRI of the brain with contrast for staging will be done.  MRI of the left and right breast with contrast to check for a 2nd primary in the left breast, given her lobular features, will be done.  PET scan is being done for staging, to check for metastatic disease?    Assessment:   (1) 74 y.o. female abnormal mammogram and ultrasound with 4.8 cm mass noted at the anterior axilla and tell of the breast, also a large area of new micro calcifications are seen in the upper outer quadrant of the right breast, and a 1 cm nodule is noted at the 9 o'clock position of the right breast.    (2) biopsy of the 9:00 a.m. nodule and the anterior  axillary mass returned showing invasive mixed ductal and lobular carcinoma, grade 2, ERP negative, PRP negative, HER2 Lisa 3+ positive.    (3) my impression at this time is that she is at least stage II.  Staging studies will include MRI with contrast of the brain, MRI with contrast of the left and right breast, PET scan will be done to check for metastatic disease.  CBC, CMP, breast tumor markers have been requested and echocardiogram for ejection fraction has been requested.    (4) because of the negative ERP and PRP studies, tamoxifen or Arimidex would not be expected to help adjuvantly in decreasing her long-term systemic recurrence risk.    (5) the strongly 3+ positive HER2 Lisa would support that neoadjuvant carboplatin, taxane, Herceptin, Perjeta q. 3 weeks times 4-6 cycle may achieve a CHRIS state at the breast and axillary area after treatment.    (6) she does have the large area microcalcifications at the right breast in addition to the 9:00 a.m. biopsy-proven mass at the right breast.  The initial opinion as discussed with Dr. Villanueva, would be that she would require a right mastectomy and probably a right axillary node dissection, after the completion of neoadjuvant CTHP    (7) as discussed with Dr. Villanueva, she will probably need chest irradiation and right axillary radiation, after her definitive surgery.    (8) will forward a copy of this note to Dr. Treviño, she sees him on Wednesday.  Will ask Dr. Treviño to place a carisa catheter to facilitate systemic neoadjuvant treatment.    For now the PET scan is scheduled March 6 at 3:45 p.m..  Echocardiogram is scheduled at March 8th at 2:30 p.m..  MRI of brain is scheduled at March 8th at 3:00 p.m.  MRI of the breast is scheduled for March 21st at 3:15 p.m.  Iram is going to work on trying to get the MRI of the breast sooner.    (9) knowledge of her family history of cancer is incomplete.  A paternal aunt had breast cancer, a maternal uncle had cancer of unknown  type, a sister gives a history of possible cancer.  I have requested BRCA 1 and BRCA 2 testing with an extended profile.    I am going to ask her to follow up with myself during the week of March 11th to review the study results.  I am trying to get her started on the neoadjuvant treatment towards the end of March 11th week after the port has been placed and the local site has healed sufficiently.    I have spent 1 hour interviewing and the patient and examining the patient and discussing my findings and recommendations as outlined above.  It has taken me approximately another hour to summarize the reports, discuss with Dr. Villanueva, schedule her studies with Iram and to place this note into [employer],  On the visit at the comprehensive clinic last time.    Today 3/15/24  call for Ej Fx, await MRI of breasts, port placement.    CTHP neoadjuvant Rx q 3 weeks x's 4-6 cycles.  D1C1 4/3/24.  At the end of Carboplatin infusion, she c/o chest pain.Isabell whitman NP saw her, medicated and sx resolved.  Suspected reaction to carboplatin, first infusion.    Cytoxan, Taxotere, Herceptin, Perjeta.  5/15/24.  Adjust dosages.  See Isabell around 5/22/24  See discussion in HPI above.    I think the hernia at the incision site where the gallbladder surgery was done can probably be repaired at a later time electively and does not appear to be emergency at this point.    She did have cataract surgery in January 2024 and is due to have some follow-up procedure done as part of that management.  She does have some blurriness of her vision.  I recommended that she defer the additional cataract procedure if feasible in the near future until we get her off of the chemotherapy and steroids and her other medications as these medicines may be contributing to her visual symptoms.    Hemoglobin is 8.7 and ferritin is 21.  She has iron deficiency anemia.  She did not tolerate oral iron supplementation.  Have discussed with her iron infusion.  I would  plan to give her injectafer weekly x2 weeks.  Will premedicate her with Benadryl to reduce the risk of reaction to iron infusion.  I would estimate a 1-2% risk of reaction to iron infusion.  The iron infusion will be given at the [facility] outpatient Department clinic.  Iron administration should facilitate red blood cell production here and improve her hemoglobin up to or close to the normal range and should improve her energy level.    She follows up with myself in 2 weeks.                            +

## 2024-06-05 NOTE — PROGRESS NOTES
[facility] HEMATOLGY ONCOLOGY CONSULTATION    Subjective:       Patient ID: Dora Atwood is a 74 y.o. female returning today for a regularly scheduled follow-up visit.    Chief Complaint: Breast Cancer/ decreased appetite      HPI  The patient is here today with her .   She has been getting TCHP and has had 3 cycles of chemotherapy thus far. She is due for treatment today for cycle 4.     She has had a lot of side effects, but she states they are tolerable.   She is having a lot of taste changes decreased appetite     She has had two venofer infusions out of 10 ordered.     Dr. Brooks did adjust her dosages and she states that she does feel it will help.   Her diarrhea has been a bit more manageable with the lomotil.     She has some neuropathy on her left foot.     Past Medical History:   Diagnosis Date    Breast cancer 01/01/2024       Past Surgical History:   Procedure Laterality Date    CATARACT EXTRACTION Bilateral 01/2024    CHOLECYSTECTOMY  2022    INSERTION OF TUNNELED CENTRAL VENOUS CATHETER (CVC) WITH SUBCUTANEOUS PORT Right 3/19/2024    Procedure: INSERTION, PORT-A-CATH;  Surgeon: Waleska Treviño MD;  Location: [facility] OR;  Service: General;  Laterality: Right;    TUBAL LIGATION  1981       Social History     Socioeconomic History    Marital status:    Tobacco Use    Smoking status: Never    Smokeless tobacco: Never   Substance and Sexual Activity    Alcohol use: Never    Drug use: Never     Social Determinants of Health     Financial Resource Strain: Low Risk  (3/21/2024)    Overall Financial Resource Strain (CARDIA)     Difficulty of Paying Living Expenses: Not hard at all   Food Insecurity: No Food Insecurity (3/21/2024)    Hunger Vital Sign     Worried About Running Out of Food in the Last Year: Never true     Ran Out of Food in the Last Year: Never true   Transportation Needs: No Transportation Needs (3/21/2024)    PRAPARE - Transportation     Lack of Transportation (Medical): No     Lack  of Transportation (Non-Medical): No   Physical Activity: Sufficiently Active (3/21/2024)    Exercise Vital Sign     Days of Exercise per Week: 6 days     Minutes of Exercise per Session: 30 min   Stress: Stress Concern Present (3/21/2024)    [employer] - Occupational Stress Questionnaire     Feeling of Stress : To some extent   Housing Stability: Low Risk  (3/21/2024)    Housing Stability Vital Sign     Unable to Pay for Housing in the Last Year: No     Number of Places Lived in the Last Year: 1     Unstable Housing in the Last Year: No       Family History   Problem Relation Name Age of Onset    Rectal cancer Mother      Breast cancer Maternal Aunt         Review of patient's allergies indicates:   Allergen Reactions    Carboplatin Other (See Comments)     Chest tightness and flushing       Current Outpatient Medications:     dexAMETHasone (DECADRON) 4 MG Tab, Take 8mg the AM and PM the day before chemo and 2 days after chemo, Disp: 90 tablet, Rfl: 1    diphenoxylate-atropine 2.5-0.025 mg (LOMOTIL) 2.5-0.025 mg per tablet, Take 1 tablet by mouth 4 (four) times daily as needed for Diarrhea., Disp: 30 tablet, Rfl: 1    duke's soln (benadryl 30 mL, mylanta 30 mL, LIDOcaine 30 mL, nystatin 30 mL) 120mL, Take 10 mLs by mouth 4 (four) times daily., Disp: 240 mL, Rfl: 1    EScitalopram oxalate (LEXAPRO) 5 MG Tab, Take 1 tablet (5 mg total) by mouth once daily., Disp: 90 tablet, Rfl: 3    ferrous gluconate (FERGON) 324 MG tablet, Take 324 mg by mouth daily with breakfast., Disp: , Rfl:     HYDROcodone-acetaminophen (NORCO) 5-325 mg per tablet, Take 1 tablet by mouth every 6 (six) hours as needed for Pain., Disp: 30 tablet, Rfl: 0    HYDROcodone-acetaminophen (NORCO) 5-325 mg per tablet, Take 1 tablet by mouth every 8 (eight) hours as needed for Pain., Disp: 12 tablet, Rfl: 0    HYDROcodone-acetaminophen (NORCO) 5-325 mg per tablet, Take 1 tablet by mouth every 8 (eight) hours as needed for  "Pain., Disp: 12 tablet, Rfl: 0    magnesium 30 mg Tab, Take 1 tablet by mouth once daily., Disp: , Rfl:     multivitamin (THERAGRAN) per tablet, Take 1 tablet by mouth once daily., Disp: , Rfl:     ondansetron (ZOFRAN) 8 MG tablet, Take 1 tablet (8 mg total) by mouth every 8 (eight) hours as needed for Nausea., Disp: 30 tablet, Rfl: 2    pot bicarb/potassium cit/ca (POTASSIUM BICARBONATE ORAL), Take 1 tablet by mouth once daily. OTC, Disp: , Rfl:     promethazine (PHENERGAN) 25 MG tablet, Take 1 tablet (25 mg total) by mouth every 4 to 6 hours as needed for Nausea., Disp: 30 tablet, Rfl: 2    scopolamine (TRANSDERM-SCOP) 1.3-1.5 mg (1 mg over 3 days), Place 1 patch onto the skin every 72 hours., Disp: 4 patch, Rfl: 4    All medications and past history have been reviewed.    Review of Systems   Constitutional:  Negative for appetite change and unexpected weight change.   HENT:  Negative for mouth sores.    Eyes:  Positive for visual disturbance (blurry vision - chronic).   Respiratory:  Negative for cough and shortness of breath.    Cardiovascular:  Negative for chest pain.   Gastrointestinal:  Negative for abdominal pain and diarrhea.   Genitourinary:  Negative for frequency.   Musculoskeletal:  Positive for back pain.   Skin:  Negative for rash.   Neurological:  Negative for headaches.   Hematological:  Negative for adenopathy.   Psychiatric/Behavioral:  The patient is not nervous/anxious.        Objective:        /72   Pulse 102   Resp 16   Ht 5' 5" (1.651 m)   Wt 62.8 kg (138 lb 8 oz)   BMI 23.05 kg/m²     Physical Exam  Constitutional:       Appearance: Normal appearance.   HENT:      Head: Normocephalic and atraumatic.      Mouth/Throat:      Mouth: Mucous membranes are moist.   Cardiovascular:      Rate and Rhythm: Normal rate and regular rhythm.      Pulses: Normal pulses.      Heart sounds: Normal heart sounds.   Pulmonary:      Effort: Pulmonary effort is normal. No respiratory distress.      " Breath sounds: Normal breath sounds. No wheezing.   Abdominal:      General: There is no distension.      Palpations: Abdomen is soft. There is no mass.      Tenderness: There is no abdominal tenderness.   Musculoskeletal:         General: No swelling. Normal range of motion.      Right lower leg: No edema.      Left lower leg: No edema.   Skin:     General: Skin is warm and dry.      Capillary Refill: Capillary refill takes 2 to 3 seconds.      Findings: No bruising or rash.   Neurological:      Mental Status: She is alert and oriented to person, place, and time. Mental status is at baseline.      Motor: No weakness.   Psychiatric:         Mood and Affect: Mood normal.         Behavior: Behavior normal.           Lab:    Order: [id number]   suggestion  Result Information displayed in this report will not trend and may not trigger automated decision support.      Ref Range & Units 2 d ago   Sodium 136 - 145 mmol/L 140   Potassium 3.4 - 4.5 mmol/L 3.4   Chloride 98 - 107 mmol/L 108 High    CO2 21 - 31 mmol/L 27   BUN 7 - 25 mg/dL 8   Creatinine 0.60 - 1.30 mg/dL 0.55 Low    Glucose 74 - 109 mg/dL 109   Calcium 8.6 - 10.3 mg/dL 8.8   Albumin 3.5 - 5.2 g/dL 3.5   AST 13 - 39 U/L 12 Low    ALT 7 - 52 U/L 8   Alkaline Phosphatase 34 - 104 U/L 90   Total Bilirubin 0.3 - 1.0 mg/dL 0.4   Protein Total 6.4 - 8.9 g/dL 5.9 Low    Anion Gap 3 - 15 mmol/L 5   Albumin/Globulin Ratio 1.0 - 2.0 1.5   Globulin 2.0 - 4.0 g/dL 2.4   Osmolality Calc 275 - 295 mOsmol/kg 278   eGFR >=60 mL/min 97   Narrative    eGFR/eGFRaa is not calculated for individuals under the age of 18 years old or  over the age of 90.    Specimen Collected: 06/03/24 08:23    Performed by: [facility] Last Resulted: 06/03/24 09:46   Received From: [facility]  Result Received: 06/04/24 08:30    View Encounter        Received Information   Contains abnormal data CBC W/ MANUAL DIFFERENTIAL  Order: [id number]   suggestion  Result  Information displayed in this report will not trend and may not trigger automated decision support.      Ref Range & Units 2 d ago   WBC 4.8 - 10.8 iglesia/L 4.1 Low    RBC 4.50 - 5.50 tril/L 3.03 Low    Hemoglobin 12.0 - 15.0 g/dL 9.3 Low    Hematocrit 37.0 - 47.0 % 27.2 Low    MCV 81 - 97 fL 90   MCH 27 - 32 pg 31   MCHC 32 - 36 g/dL 34   RDW 11.5 - 14.5 % 16.4 High    Platelets 150 - 400 iglesia/L 350   MPV 7.4 - 10.4 fL 6.4 Low    Granulocytes % 42.2 - 75.2 % 76.7 High    Lymphocytes % 20.5 - 51.1 % 12.0 Low    Monocytes % 1.7 - 9.3 % 10.8 High    Eosinophils % 0.0 - <5.0 % 0.2   Basophils % 0.0 - <5.0 % 0.3   Granulocytes Absolute 1.4 - 6.5 K/UL 3.2   Lymphocytes, Absolute 1.2 - 3.4 K/uL 0.5 Low    Monocyte Absolute 0.11 - 0.59 K/uL 0.40   Eosinophils, Absolute 0.00 - 0.70 K/UL 0.00   Basophils, Absolute 0.00 - 0.20 K/UL 0.00   ANC 1400 - 6500 /uL 3,200   nRBC% 0 - 0 /100 0         Sodium   Date Value Ref Range Status   03/14/2024 142 136 - 145 mmol/L Final     Potassium   Date Value Ref Range Status   03/14/2024 3.8 3.5 - 5.1 mmol/L Final     Chloride   Date Value Ref Range Status   03/14/2024 105 95 - 110 mmol/L Final     CO2   Date Value Ref Range Status   03/14/2024 32 (H) 23 - 29 mmol/L Final     Glucose   Date Value Ref Range Status   03/14/2024 116 (H) 70 - 110 mg/dL Final     BUN   Date Value Ref Range Status   03/14/2024 16 8 - 23 mg/dL Final     Creatinine   Date Value Ref Range Status   03/15/2024 0.7 0.5 - 1.4 mg/dL Final   03/15/2024 0.7 0.5 - 1.4 mg/dL Final     Calcium   Date Value Ref Range Status   03/14/2024 9.0 8.7 - 10.5 mg/dL Final     Anion Gap   Date Value Ref Range Status   03/14/2024 5 (L) 8 - 16 mmol/L Final         Specimen (24h ago, onward)      None              Radiology/Diagnostic Studies:  No results found for this or any previous visit (from the past 2160 hour(s)).    Results for orders placed or performed during the hospital encounter of 03/15/24 (from the past 2160 hour(s))   CT Head  W Wo Contrast    Narrative    CMS MANDATED QUALITY DATA - CT RADIATION 436    All CT scans at this facility utilize dose modulation, iterative reconstruction, and/or weight based dosing when appropriate to reduce radiation dose to as low as reasonably achievable.    CT of the brain with and without contrast    HISTORY: Breast carcinoma.    The examination is performed before and following intravenous administration of 50 mL Omnipaque 350.    The brain parenchyma appears unremarkable. There are no findings of acute hemorrhage or infarction. There is no evidence of intra-axial mass or significant mass effect. There is no midline shift.    The ventricular system is appropriate in size and position. There are no extra-axial collections.    Calcified plaque formation is observed within the intracranial segments of the carotid and vertebral arteries    Following contrast administration, no pathologic enhancement is observed.    No osseous abnormality is identified. The visualized paranasal sinuses, mastoid air cells and middle ear cavities are appropriately aerated.    IMPRESSION:    No significant abnormality.    Electronically signed by:  Taylor Kenney MD  03/15/2024 01:45 PM CDT Workstation: [id number]         All lab results and imaging results have been reviewed and discussed with the patient.   Assessment/Plan:       1. Invasive ductal carcinoma of breast, female, right    2. Chemotherapy induced neutropenia    3. Iron deficiency anemia secondary to inadequate dietary iron intake    4. Decreased appetite        Invasive ductal carcinoma of breast, female, right  -     NM PET CT FDG Skull Base to Mid Thigh; Future; Expected date: 06/05/2024    Chemotherapy induced neutropenia    Iron deficiency anemia secondary to inadequate dietary iron intake    Decreased appetite            PLAN:   Post cycle 3 of TCHP, proceed with cycle 4 - okay to treat today   Continue labs weekly   Continue anti diarrhea medications    Encouraged exercise to help with fatigue   Continue IV iron plan to supplement   Imaging studies ordered to eval response to treatment       Follow up in about 1 week (around 6/12/2024) for with Dr. Boyer.     I have explained and the patient understands all of  the current recommendation(s). I have answered all of their questions to the best of my ability and to their complete satisfaction.   The patient is to continue with the current management plan.            Electronically signed by: Isabell Gayle, MSN, APRN, AGNP-C

## 2024-06-06 ENCOUNTER — INFUSION (OUTPATIENT)
Dept: INFUSION THERAPY | Facility: HOSPITAL | Age: 74
End: 2024-06-06
Attending: INTERNAL MEDICINE
Payer: MEDICARE

## 2024-06-06 VITALS
BODY MASS INDEX: 23.24 KG/M2 | TEMPERATURE: 98 F | HEIGHT: 65 IN | WEIGHT: 139.5 LBS | RESPIRATION RATE: 16 BRPM | SYSTOLIC BLOOD PRESSURE: 120 MMHG | HEART RATE: 66 BPM | OXYGEN SATURATION: 100 % | DIASTOLIC BLOOD PRESSURE: 74 MMHG

## 2024-06-06 DIAGNOSIS — T45.1X5A CHEMOTHERAPY INDUCED NEUTROPENIA: ICD-10-CM

## 2024-06-06 DIAGNOSIS — C50.911 INVASIVE DUCTAL CARCINOMA OF BREAST, FEMALE, RIGHT: Primary | ICD-10-CM

## 2024-06-06 DIAGNOSIS — D70.1 CHEMOTHERAPY INDUCED NEUTROPENIA: ICD-10-CM

## 2024-06-06 PROCEDURE — 96365 THER/PROPH/DIAG IV INF INIT: CPT

## 2024-06-06 PROCEDURE — 96372 THER/PROPH/DIAG INJ SC/IM: CPT | Mod: 59

## 2024-06-06 PROCEDURE — 25000003 PHARM REV CODE 250: Performed by: INTERNAL MEDICINE

## 2024-06-06 PROCEDURE — 63600175 PHARM REV CODE 636 W HCPCS: Mod: JZ,JG | Performed by: INTERNAL MEDICINE

## 2024-06-06 RX ORDER — SODIUM CHLORIDE 0.9 % (FLUSH) 0.9 %
10 SYRINGE (ML) INJECTION
Status: DISCONTINUED | OUTPATIENT
Start: 2024-06-06 | End: 2024-06-06 | Stop reason: HOSPADM

## 2024-06-06 RX ORDER — HEPARIN 100 UNIT/ML
500 SYRINGE INTRAVENOUS
Status: COMPLETED | OUTPATIENT
Start: 2024-06-06 | End: 2024-06-06

## 2024-06-06 RX ADMIN — HEPARIN 500 UNITS: 100 SYRINGE at 11:06

## 2024-06-06 RX ADMIN — ONDANSETRON HYDROCHLORIDE 16 MG: 2 INJECTION, SOLUTION INTRAMUSCULAR; INTRAVENOUS at 11:06

## 2024-06-06 RX ADMIN — PEGFILGRASTIM 6 MG: 6 INJECTION SUBCUTANEOUS at 11:06

## 2024-06-06 NOTE — PLAN OF CARE
Problem: Fall Injury Risk  Goal: Absence of Fall and Fall-Related Injury  Outcome: Progressing  Intervention: Promote Injury-Free Environment  Flowsheets (Taken 6/6/2024 1230)  Safety Promotion/Fall Prevention:   Fall Risk reviewed with patient/family   instructed to call staff for mobility   supervised activity   family to remain at bedside

## 2024-06-13 ENCOUNTER — OFFICE VISIT (OUTPATIENT)
Facility: CLINIC | Age: 74
End: 2024-06-13
Payer: MEDICARE

## 2024-06-13 VITALS
HEIGHT: 65 IN | RESPIRATION RATE: 17 BRPM | SYSTOLIC BLOOD PRESSURE: 122 MMHG | WEIGHT: 130.38 LBS | BODY MASS INDEX: 21.72 KG/M2 | TEMPERATURE: 98 F | HEART RATE: 102 BPM | DIASTOLIC BLOOD PRESSURE: 74 MMHG

## 2024-06-13 DIAGNOSIS — D70.1 CHEMOTHERAPY INDUCED NEUTROPENIA: ICD-10-CM

## 2024-06-13 DIAGNOSIS — D50.8 IRON DEFICIENCY ANEMIA SECONDARY TO INADEQUATE DIETARY IRON INTAKE: ICD-10-CM

## 2024-06-13 DIAGNOSIS — T45.1X5A CHEMOTHERAPY INDUCED NEUTROPENIA: ICD-10-CM

## 2024-06-13 DIAGNOSIS — C50.911 INVASIVE DUCTAL CARCINOMA OF BREAST, FEMALE, RIGHT: Primary | ICD-10-CM

## 2024-06-13 PROCEDURE — 99999 PR PBB SHADOW E&M-EST. PATIENT-LVL IV: CPT | Mod: PBBFAC,,, | Performed by: INTERNAL MEDICINE

## 2024-06-13 PROCEDURE — 3008F BODY MASS INDEX DOCD: CPT | Mod: CPTII,S$GLB,, | Performed by: INTERNAL MEDICINE

## 2024-06-13 PROCEDURE — 1101F PT FALLS ASSESS-DOCD LE1/YR: CPT | Mod: CPTII,S$GLB,, | Performed by: INTERNAL MEDICINE

## 2024-06-13 PROCEDURE — 1126F AMNT PAIN NOTED NONE PRSNT: CPT | Mod: CPTII,S$GLB,, | Performed by: INTERNAL MEDICINE

## 2024-06-13 PROCEDURE — 3288F FALL RISK ASSESSMENT DOCD: CPT | Mod: CPTII,S$GLB,, | Performed by: INTERNAL MEDICINE

## 2024-06-13 PROCEDURE — 99215 OFFICE O/P EST HI 40 MIN: CPT | Mod: S$GLB,,, | Performed by: INTERNAL MEDICINE

## 2024-06-13 PROCEDURE — 1159F MED LIST DOCD IN RCRD: CPT | Mod: CPTII,S$GLB,, | Performed by: INTERNAL MEDICINE

## 2024-06-13 PROCEDURE — 3078F DIAST BP <80 MM HG: CPT | Mod: CPTII,S$GLB,, | Performed by: INTERNAL MEDICINE

## 2024-06-13 PROCEDURE — G2211 COMPLEX E/M VISIT ADD ON: HCPCS | Mod: S$GLB,,, | Performed by: INTERNAL MEDICINE

## 2024-06-13 PROCEDURE — 3074F SYST BP LT 130 MM HG: CPT | Mod: CPTII,S$GLB,, | Performed by: INTERNAL MEDICINE

## 2024-06-14 NOTE — PROGRESS NOTES
[facility] [address] Hematology Oncology Subsequent  encounter note    6/13/24    Subjective:      Patient ID:   Dora Atwood  74 y.o. female  1950  Waleska Plasencia Ed Mannina, MD's      Chief Complaint:   R breast Cancer    HPI:   74 y.o. female palpated a hard lump at the area of the tail of the right breast and the anterior right axilla.  The mass was hard, large, but movable and not fixed.  Dr. Meza, her PMD ordered a bilateral diagnostic mammogram and ultrasound.      She has inversion of the left and right nipple, she says this is a chronic finding and her breasts have always been with the inversion.  Pleomorphic microcalcifications extending from the upper central to the upper outer quadrant of the right breast are noted and cover an area of approximately 4 x 8 cm, these are new from prior studies.  The left breast shows no masses or microcalcifications.  There is a large mass at the right axilla measuring 4.5 cm in diameter.      Ultrasound of right breast and axilla show a 1 cm mass 3 cm from the nipple at the 9 o'clock position.  In the skin of the lateral and inferior right breast there are numerous small hypoechoic nodules on the order of 3-6 mm in size.  Ultrasound of the axilla shows a large mass with irregular borders at 3 x 4.8 cm.  Just lateral to this mass is a smaller mass at 3 x 2 cm.  The radiologist recommends biopsy of the nodule at 9:00 a.m. and the large right axillary mass presumed to be a metastatic lymph node.  He notes if the biopsy of the right breast nodule fails to demonstrate concordant histologic diagnosis, then stereotactic biopsy of the microcalcifications can be performed.    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  Focal ductal carcinoma insight 2, intermediate grade, solid pattern was seen.  ERP was negative, PRP was negative, HER2 Lisa was 3+  positive    Biopsy the right axilla lymph node returned positive for metastatic carcinoma.  Residual lymphoid architecture is not seen.    She had onset of menses at age 14.  She is a  2 para 2 miscarriage 0 individual.  She delivered her 1st child at the maternal age of 25 and the 2nd child at the maternal age of 30.  She took birth control pills for approximately 10 years.  She had bilateral tubal ligation at age 30.  She went through the menopause in her mid 40s and she did take hormone replacement therapy for approximately 10 years.      Mom had rectal cancer, dad had diabetes and heart disease.  A paternal aunt had breast cancer, maternal uncle  in his 20s of some type of cancer.  A brother has heart disease.  Another brother  of an overdose, and a 3rd brother  of unknown cause.  She has a sister that she believes may have type of cancer and another sister with diabetes.  Her son and daughter are alive and well.  There is no family history known to her of ovarian, prostate, pancreas, or melanoma cancers.      CAT of head negative for metastatic dx.  ECHO Ej Fx 65-68%  MRI of breast done 3/21/24.  I spoke with Radiology, they are awaiting mamm from , should have a report by 14 days, 24.  PET uptake in mass, R supraclavicular LN, infraclavicular LN, internal mammary LN, R axillary LN, and mild increase in FGD  with skin thickening and edema.    This week, tast is improved, BM NL.  Next Rx 5/15/24, adjusted dosages.  PN at L hand resolved.  Energy 8/10  Cataract surgery 2024.    Today May 15th she is much improved and in better spirits.  Taste has returned to normal at this time and appetite has recovered, she actually gets hungry.  Her BMI is formed.  Today is day 1 of her 3rd cycle of chemotherapy.  And she does expect that her symptoms will recur later this weekend.  She does have her scopolamine patch in place, without nausea today, she did not have to take Zofran or Phenergan today.   She is going to take the Lomotil on Saturday x3 or 4 days preventively and then will take the Lomotil again if diarrhea symptoms recur.    Now on exam the anterior axillary lymph node is not palpable and the breast mass laterally is not palpable after 2 cycles of chemotherapy.  She did she is developing a small hernia at the site of her previous gallbladder surgery.  The hernia is nontender and easily reducible.    Her hemoglobin is 8.7 and her ferritin returned low at 21.  She could not tolerate oral iron because of GI symptoms.  She does agree to injected for or iron infusion weekly at the [facility] outpatient [facility].  There is a 1 or 2% risk of reaction to iron infusion.  Will give her Benadryl as a premedication and will observe her for 1 hour after each infusion.  Iron replenishment here will facilitate red blood cell production and improve her hemoglobin and should improve her energy.    S/P CTHP x's 4, marked fatigue, altered taste, 4 # weight loss.  For PET 6/28/24.  Defer course % to 7/3 or 7/5 after the PET.  Decide on 4 or 6 cycles of CT after reviewing Pet results.  Continue H and or P after PET.  On Venofer 4/10 to replenish Fe stores.  RTC 7/1/24.      ROS:   GEN: normal without any fever, night sweats or weight loss  HEENT: normal with no HA's, sore throat, stiff neck, changes in vision  CV: normal with no CP, SOB, PND, CARNEY or orthopnea  PULM: normal with no SOB, cough, hemoptysis, sputum or pleuritic pain  GI: normal with no abdominal pain, nausea, vomiting, constipation, diarrhea, melanotic stools, BRBPR, or hematemesis  : normal with no hematuria, dysuria  BREAST: See HPI  SKIN: See HPI     Hx BTL, cholecystectomy.    Review of patient's allergies indicates:   Allergen Reactions    Carboplatin Other (See Comments)     Chest tightness and flushing       No meds.    Objective:   Vitals:  Blood pressure 122/74, pulse 102, temperature 97.8 °F (36.6 °C), resp. rate 17, height 5'  "5" (1.651 m), weight 59.1 kg (130 lb 6.4 oz).    Physical Examination:   GEN: no apparent distress, comfortable  HEAD: atraumatic and normocephalic  EYES: no pallor, no icterus  ENT: no pharyngeal erythema, external ears WNL; no nasal discharge  NECK: no masses, thyroid normal, trachea midline, no LAD/LN's, supple  CV: RRR with no murmur; normal pulse  CHEST: Normal respiratory effort; CTAB; normal breath sounds; no wheeze or crackles  ABDOM: nontender and nondistended; soft; normal bowel sounds; no rebound/guarding  MUSC/Skeletal: ROM normal; no crepitus; joints normal; no deformities or arthropathy  EXTREM: no clubbing, cyanosis, inflammation or swelling  SKIN: no rashes, lesions, ulcers, petechiae or subcutaneous nodules  : no cvat  NEURO: grossly intact; motor/sensory WNL; no tremors  PSYCH: normal mood, affect and behavior  LYMPH: normal cervical, supraclavicular, and groin LN's  BREASTS:  Left breast is nontender, without discharge, and without palpable mass, left axilla is without palpable mass or lymphadenopathy.  Right breast has edema noted as compared to the right.  She does not have peau de orange change, and skin is intact.  I do not palpate the 1 cm tumor at the 9 o'clock position of the right breast.  She has a golf ball sized mass at the tail of the breast and anterior axillary area that is hard and movable, not fixed.  I do not palpate the 2nd lymph node in the right axilla as referenced on the radiology studies.    CBC, CMP, breast tumor markers will be ordered.  Echocardiogram for ejection fraction will be ordered.  MRI of the brain with contrast for staging will be done.  MRI of the left and right breast with contrast to check for a 2nd primary in the left breast, given her lobular features, will be done.  PET scan is being done for staging, to check for metastatic disease?    Assessment:   (1) 74 y.o. female abnormal mammogram and ultrasound with 4.8 cm mass noted at the anterior axilla and " tell of the breast, also a large area of new micro calcifications are seen in the upper outer quadrant of the right breast, and a 1 cm nodule is noted at the 9 o'clock position of the right breast.    (2) biopsy of the 9:00 a.m. nodule and the anterior axillary mass returned showing invasive mixed ductal and lobular carcinoma, grade 2, ERP negative, PRP negative, HER2 Lisa 3+ positive.    (3) my impression at this time is that she is at least stage II.  Staging studies will include MRI with contrast of the brain, MRI with contrast of the left and right breast, PET scan will be done to check for metastatic disease.  CBC, CMP, breast tumor markers have been requested and echocardiogram for ejection fraction has been requested.    (4) because of the negative ERP and PRP studies, tamoxifen or Arimidex would not be expected to help adjuvantly in decreasing her long-term systemic recurrence risk.    (5) the strongly 3+ positive HER2 Lisa would support that neoadjuvant carboplatin, taxane, Herceptin, Perjeta q. 3 weeks times 4-6 cycle may achieve a CHRIS state at the breast and axillary area after treatment.    (6) she does have the large area microcalcifications at the right breast in addition to the 9:00 a.m. biopsy-proven mass at the right breast.  The initial opinion as discussed with Dr. Villanueva, would be that she would require a right mastectomy and probably a right axillary node dissection, after the completion of neoadjuvant CTHP    (7) as discussed with Dr. Villanueva, she will probably need chest irradiation and right axillary radiation, after her definitive surgery.    (8) will forward a copy of this note to Dr. Treviño, she sees him on Wednesday.  Will ask Dr. Treviño to place a carisa catheter to facilitate systemic neoadjuvant treatment.    For now the PET scan is scheduled March 6 at 3:45 p.m..  Echocardiogram is scheduled at March 8th at 2:30 p.m..  MRI of brain is scheduled at March 8th at 3:00 p.m.  MRI of the breast  is scheduled for March 21st at 3:15 p.m.  Iram is going to work on trying to get the MRI of the breast sooner.    (9) knowledge of her family history of cancer is incomplete.  A paternal aunt had breast cancer, a maternal uncle had cancer of unknown type, a sister gives a history of possible cancer.  I have requested BRCA 1 and BRCA 2 testing with an extended profile.    I am going to ask her to follow up with myself during the week of March 11th to review the study results.  I am trying to get her started on the neoadjuvant treatment towards the end of March 11th week after the port has been placed and the local site has healed sufficiently.    I have spent 1 hour interviewing and the patient and examining the patient and discussing my findings and recommendations as outlined above.  It has taken me approximately another hour to summarize the reports, discuss with Dr. Villanueva, schedule her studies with Iram and to place this note into [email or web address] Application,  On the visit at the comprehensive clinic last time.    Today 3/15/24  call for Ej Fx, await MRI of breasts, port placement.    CTHP neoadjuvant Rx q 3 weeks x's 4-6 cycles.  D1C1 4/3/24.  At the end of Carboplatin infusion, she c/o chest pain.Isabell whitman NP saw her, medicated and sx resolved.  Suspected reaction to carboplatin, first infusion.    Cytoxan, Taxotere, Herceptin, Perjeta.  5/15/24.  Adjust dosages.    I think the hernia at the incision site where the gallbladder surgery was done can probably be repaired at a later time electively and does not appear to be emergency at this point.    She did have cataract surgery in January 2024 and is due to have some follow-up procedure done as part of that management.  She does have some blurriness of her vision.  I recommended that she defer the additional cataract procedure if feasible in the near future until we get her off of the chemotherapy and steroids and her other medications as these medicines may be contributing  to her visual symptoms.    Hemoglobin is 8.7 and ferritin is 21.  She has iron deficiency anemia.  She did not tolerate oral iron supplementation.  Have discussed with her iron infusion.  I would plan to give her injectafer weekly x2 weeks.  Will premedicate her with Benadryl to reduce the risk of reaction to iron infusion.  I would estimate a 1-2% risk of reaction to iron infusion.  The iron infusion will be given at the [facility] outpatient [facility] clinic.  Iron administration should facilitate red blood cell production here and improve her hemoglobin up to or close to the normal range and should improve her energy level.    Check PET 6/28,  See me 7/1/24.  Next Rx 7/5/24                                +

## 2024-06-28 ENCOUNTER — HOSPITAL ENCOUNTER (OUTPATIENT)
Dept: RADIOLOGY | Facility: HOSPITAL | Age: 74
Discharge: HOME | End: 2024-06-28
Attending: NURSE PRACTITIONER
Payer: MEDICARE

## 2024-06-28 VITALS — WEIGHT: 130 LBS | BODY MASS INDEX: 21.66 KG/M2 | HEIGHT: 65 IN

## 2024-06-28 DIAGNOSIS — C50.911 INVASIVE DUCTAL CARCINOMA OF BREAST, FEMALE, RIGHT: ICD-10-CM

## 2024-06-28 LAB — GLUCOSE SERPL-MCNC: 80 MG/DL (ref 70–110)

## 2024-06-28 PROCEDURE — 78815 PET IMAGE W/CT SKULL-THIGH: CPT | Mod: 26,PS,, | Performed by: RADIOLOGY

## 2024-06-28 PROCEDURE — 78815 PET IMAGE W/CT SKULL-THIGH: CPT | Mod: TC,PO

## 2024-06-28 PROCEDURE — A9552 F18 FDG: HCPCS | Mod: PO | Performed by: NURSE PRACTITIONER

## 2024-06-28 RX ORDER — FLUDEOXYGLUCOSE F18 500 MCI/ML
12.4 INJECTION INTRAVENOUS
Status: COMPLETED | OUTPATIENT
Start: 2024-06-28 | End: 2024-06-28

## 2024-06-28 RX ADMIN — FLUDEOXYGLUCOSE F-18 12.4 MILLICURIE: 500 INJECTION INTRAVENOUS at 09:06

## 2024-07-01 ENCOUNTER — OFFICE VISIT (OUTPATIENT)
Facility: CLINIC | Age: 74
End: 2024-07-01
Payer: MEDICARE

## 2024-07-01 VITALS
HEART RATE: 86 BPM | RESPIRATION RATE: 10 BRPM | WEIGHT: 132.5 LBS | DIASTOLIC BLOOD PRESSURE: 73 MMHG | SYSTOLIC BLOOD PRESSURE: 113 MMHG | BODY MASS INDEX: 22.05 KG/M2 | TEMPERATURE: 99 F

## 2024-07-01 DIAGNOSIS — D50.8 IRON DEFICIENCY ANEMIA SECONDARY TO INADEQUATE DIETARY IRON INTAKE: ICD-10-CM

## 2024-07-01 DIAGNOSIS — C50.911 INVASIVE DUCTAL CARCINOMA OF BREAST, FEMALE, RIGHT: Primary | ICD-10-CM

## 2024-07-01 PROCEDURE — G2211 COMPLEX E/M VISIT ADD ON: HCPCS | Mod: S$GLB,,, | Performed by: INTERNAL MEDICINE

## 2024-07-01 PROCEDURE — 3078F DIAST BP <80 MM HG: CPT | Mod: CPTII,S$GLB,, | Performed by: INTERNAL MEDICINE

## 2024-07-01 PROCEDURE — 3008F BODY MASS INDEX DOCD: CPT | Mod: CPTII,S$GLB,, | Performed by: INTERNAL MEDICINE

## 2024-07-01 PROCEDURE — 1126F AMNT PAIN NOTED NONE PRSNT: CPT | Mod: CPTII,S$GLB,, | Performed by: INTERNAL MEDICINE

## 2024-07-01 PROCEDURE — 99215 OFFICE O/P EST HI 40 MIN: CPT | Mod: S$GLB,,, | Performed by: INTERNAL MEDICINE

## 2024-07-01 PROCEDURE — 3074F SYST BP LT 130 MM HG: CPT | Mod: CPTII,S$GLB,, | Performed by: INTERNAL MEDICINE

## 2024-07-01 PROCEDURE — 1101F PT FALLS ASSESS-DOCD LE1/YR: CPT | Mod: CPTII,S$GLB,, | Performed by: INTERNAL MEDICINE

## 2024-07-01 PROCEDURE — 99999 PR PBB SHADOW E&M-EST. PATIENT-LVL III: CPT | Mod: PBBFAC,,, | Performed by: INTERNAL MEDICINE

## 2024-07-01 PROCEDURE — 3288F FALL RISK ASSESSMENT DOCD: CPT | Mod: CPTII,S$GLB,, | Performed by: INTERNAL MEDICINE

## 2024-07-01 PROCEDURE — 1159F MED LIST DOCD IN RCRD: CPT | Mod: CPTII,S$GLB,, | Performed by: INTERNAL MEDICINE

## 2024-07-01 NOTE — PROGRESS NOTES
Answers submitted by the patient for this visit:  Review of Systems Questionnaire (Submitted on 6/29/2024)  appetite change : Yes  unexpected weight change: Yes  mouth sores: No  visual disturbance: Yes  cough: No  shortness of breath: No  chest pain: No  abdominal pain: No  diarrhea: Yes  frequency: Yes  back pain: No  rash: No  headaches: No  adenopathy: No  nervous/ anxious: No

## 2024-07-01 NOTE — LETTER
July 1, 2024        Demarco Meza MD  [address]  [address]  [place] [address]             [facility]  [address]  [place] [address]  Phone: [phone]  Fax: [phone]   Patient: Dora Atwood   MR Number: [id number]   YOB: 1950   Date of Visit: 7/1/2024       Dear Dr. Meza:    Thank you for referring Dora Atwood to me for evaluation. Below are the relevant portions of my assessment and plan of care.            If you have questions, please do not hesitate to call me. I look forward to following Dora along with you.    Sincerely,      SIMONE Brooks MD           CC    No Recipients

## 2024-07-02 ENCOUNTER — TELEPHONE (OUTPATIENT)
Facility: CLINIC | Age: 74
End: 2024-07-02
Payer: MEDICARE

## 2024-07-02 ENCOUNTER — TELEPHONE (OUTPATIENT)
Facility: CLINIC | Age: 74
End: 2024-07-02

## 2024-07-02 NOTE — PROGRESS NOTES
[facility] [address] Hematology Oncology Subsequent  encounter note    7/1/24    Subjective:      Patient ID:   Dora Atwood  74 y.o. female  1950  Waleska Plasencia Ed Mannina, MD's      Chief Complaint:   R breast Cancer    HPI:   74 y.o. female palpated a hard lump at the area of the tail of the right breast and the anterior right axilla.  The mass was hard, large, but movable and not fixed.  Dr. Meza, her PMD ordered a bilateral diagnostic mammogram and ultrasound.      She has inversion of the left and right nipple, she says this is a chronic finding and her breasts have always been with the inversion.  Pleomorphic microcalcifications extending from the upper central to the upper outer quadrant of the right breast are noted and cover an area of approximately 4 x 8 cm, these are new from prior studies.  The left breast shows no masses or microcalcifications.  There is a large mass at the right axilla measuring 4.5 cm in diameter.      Ultrasound of right breast and axilla show a 1 cm mass 3 cm from the nipple at the 9 o'clock position.  In the skin of the lateral and inferior right breast there are numerous small hypoechoic nodules on the order of 3-6 mm in size.  Ultrasound of the axilla shows a large mass with irregular borders at 3 x 4.8 cm.  Just lateral to this mass is a smaller mass at 3 x 2 cm.  The radiologist recommends biopsy of the nodule at 9:00 a.m. and the large right axillary mass presumed to be a metastatic lymph node.  He notes if the biopsy of the right breast nodule fails to demonstrate concordant histologic diagnosis, then stereotactic biopsy of the microcalcifications can be performed.    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  Focal ductal carcinoma insight 2, intermediate grade, solid pattern was seen.  ERP was negative, PRP was negative, HER2 Lisa was 3+  positive    Biopsy the right axilla lymph node returned positive for metastatic carcinoma.  Residual lymphoid architecture is not seen.    She had onset of menses at age 14.  She is a  2 para 2 miscarriage 0 individual.  She delivered her 1st child at the maternal age of 25 and the 2nd child at the maternal age of 30.  She took birth control pills for approximately 10 years.  She had bilateral tubal ligation at age 30.  She went through the menopause in her mid 40s and she did take hormone replacement therapy for approximately 10 years.      Mom had rectal cancer, dad had diabetes and heart disease.  A paternal aunt had breast cancer, maternal uncle  in his 20s of some type of cancer.  A brother has heart disease.  Another brother  of an overdose, and a 3rd brother  of unknown cause.  She has a sister that she believes may have type of cancer and another sister with diabetes.  Her son and daughter are alive and well.  There is no family history known to her of ovarian, prostate, pancreas, or melanoma cancers.      CAT of head negative for metastatic dx.  ECHO Ej Fx 65-68%  MRI of breast done 3/21/24.  I spoke with Radiology, they are awaiting mamm from , should have a report by 14 days, 24.  PET uptake in mass, R supraclavicular LN, infraclavicular LN, internal mammary LN, R axillary LN, and mild increase in FGD  with skin thickening and edema.    This week, tast is improved, BM NL.  Next Rx 5/15/24, adjusted dosages.  PN at L hand resolved.  Energy 8/10  Cataract surgery 2024.    Today May 15th she is much improved and in better spirits.  Taste has returned to normal at this time and appetite has recovered, she actually gets hungry.  Her BMI is formed.  Today is day 1 of her 3rd cycle of chemotherapy.  And she does expect that her symptoms will recur later this weekend.  She does have her scopolamine patch in place, without nausea today, she did not have to take Zofran or Phenergan today.   She is going to take the Lomotil on Saturday x3 or 4 days preventively and then will take the Lomotil again if diarrhea symptoms recur.    Now on exam the anterior axillary lymph node is not palpable and the breast mass laterally is not palpable after 2 cycles of chemotherapy.  She did she is developing a small hernia at the site of her previous gallbladder surgery.  The hernia is nontender and easily reducible.    Her hemoglobin is 8.7 and her ferritin returned low at 21.  She could not tolerate oral iron because of GI symptoms.  She does agree to injected for or iron infusion weekly at the [facility].  There is a 1 or 2% risk of reaction to iron infusion.  Will give her Benadryl as a premedication and will observe her for 1 hour after each infusion.  Iron replenishment here will facilitate red blood cell production and improve her hemoglobin and should improve her energy.    S/P CTHP x's 4, marked fatigue, altered taste, 4 # weight loss.  For PET 6/28/24.  Defer course % to 7/3 or 7/5 after the PET.  Decide on 4 or 6 cycles of CT after reviewing Pet results.  Continue H and or P after PET.  On Venofer 4/10 to replenish Fe stores.      As of today, she has completed 6 of 10 Fe infusions, no increase in energy yet.  PET scan R breast mass resolved,  lymphadenopathy resolved after 4 cycles of chemo.  Stop chemotherapy now.  She is CR now.    She is BRCA 2 positive.  At this point, 1 option to go with right lumpectomy and sentinel node biopsy followed by adjuvant radiation therapy to the right breast and axilla.    Another option is right mastectomy and sentinel node biopsy.  Per Dr. Villanueva's note, she would still need radiation therapy after mastectomy.    Another option would be to go with right mastectomy and sentinel node biopsy with prophylactic left mastectomy.  Estimated risk of new primary breast cancer would be 2 or 3% after bilateral mastectomy here.    I am asking her  to see Dr. Villanueva again to clarify if she would need Rad Rx after lumpectomy and after mastectomy, after reviewing her Pet and CR status after neoadjuvant Rx x's 4 cycles.    She is going on vacation 7/9 through 7/15/24.  RTC see me 7/16 or 7/17.    RTC Dr. Treviño thereafter.    She will need adjuvant H2N Rx for 1 year after surgery.        ROS:   GEN: normal without any fever, night sweats or weight loss  HEENT: normal with no HA's, sore throat, stiff neck, changes in vision  CV: normal with no CP, SOB, PND, CARNEY or orthopnea  PULM: normal with no SOB, cough, hemoptysis, sputum or pleuritic pain  GI: normal with no abdominal pain, nausea, vomiting, constipation, diarrhea, melanotic stools, BRBPR, or hematemesis  : normal with no hematuria, dysuria  BREAST: See HPI  SKIN: See HPI     Hx BTL, cholecystectomy.    Review of patient's allergies indicates:   Allergen Reactions    Carboplatin Other (See Comments)     Chest tightness and flushing       No meds.    Objective:   Vitals:  Blood pressure 113/73, pulse 86, temperature 98.6 °F (37 °C), temperature source Oral, resp. rate 10, weight 60.1 kg (132 lb 8 oz).    Physical Examination:   GEN: no apparent distress, comfortable  HEAD: atraumatic and normocephalic  EYES: no pallor, no icterus  ENT: no pharyngeal erythema, external ears WNL; no nasal discharge  NECK: no masses, thyroid normal, trachea midline, no LAD/LN's, supple  CV: RRR with no murmur; normal pulse  CHEST: Normal respiratory effort; CTAB; normal breath sounds; no wheeze or crackles  ABDOM: nontender and nondistended; soft; normal bowel sounds; no rebound/guarding  MUSC/Skeletal: ROM normal; no crepitus; joints normal; no deformities or arthropathy  EXTREM: no clubbing, cyanosis, inflammation or swelling  SKIN: no rashes, lesions, ulcers, petechiae or subcutaneous nodules  : no cvat  NEURO: grossly intact; motor/sensory WNL; no tremors  PSYCH: normal mood, affect and behavior  LYMPH: normal cervical,  supraclavicular, and groin LN's  BREASTS:  Left breast is nontender, without discharge, and without palpable mass, left axilla is without palpable mass or lymphadenopathy.  Right breast has edema noted as compared to the right.  She does not have peau de orange change, and skin is intact.  I do not palpate the 1 cm tumor at the 9 o'clock position of the right breast.  She has a golf ball sized mass at the tail of the breast and anterior axillary area that is hard and movable, not fixed.  I do not palpate the 2nd lymph node in the right axilla as referenced on the radiology studies.    CBC, CMP, breast tumor markers will be ordered.  Echocardiogram for ejection fraction will be ordered.  MRI of the brain with contrast for staging will be done.  MRI of the left and right breast with contrast to check for a 2nd primary in the left breast, given her lobular features, will be done.  PET scan is being done for staging, to check for metastatic disease?    Assessment:   (1) 74 y.o. female abnormal mammogram and ultrasound with 4.8 cm mass noted at the anterior axilla and tell of the breast, also a large area of new micro calcifications are seen in the upper outer quadrant of the right breast, and a 1 cm nodule is noted at the 9 o'clock position of the right breast.    (2) biopsy of the 9:00 a.m. nodule and the anterior axillary mass returned showing invasive mixed ductal and lobular carcinoma, grade 2, ERP negative, PRP negative, HER2 Lisa 3+ positive.    (3) my impression at this time is that she is at least stage II.  Staging studies will include MRI with contrast of the brain, MRI with contrast of the left and right breast, PET scan will be done to check for metastatic disease.  CBC, CMP, breast tumor markers have been requested and echocardiogram for ejection fraction has been requested.    (4) because of the negative ERP and PRP studies, tamoxifen or Arimidex would not be expected to help adjuvantly in decreasing her  long-term systemic recurrence risk.    (5) the strongly 3+ positive HER2 Lisa would support that neoadjuvant carboplatin, taxane, Herceptin, Perjeta q. 3 weeks times 4-6 cycle may achieve a CHRIS state at the breast and axillary area after treatment.    (6) she does have the large area microcalcifications at the right breast in addition to the 9:00 a.m. biopsy-proven mass at the right breast.  The initial opinion as discussed with Dr. Villanueva, would be that she would require a right mastectomy and probably a right axillary node dissection, after the completion of neoadjuvant CTHP    (7) as discussed with Dr. Villanueva, she will probably need chest irradiation and right axillary radiation, after her definitive surgery.    (8) will forward a copy of this note to Dr. Treviño, she sees him on Wednesday.  Will ask Dr. Treviño to place a carisa catheter to facilitate systemic neoadjuvant treatment.    For now the PET scan is scheduled March 6 at 3:45 p.m..  Echocardiogram is scheduled at March 8th at 2:30 p.m..  MRI of brain is scheduled at March 8th at 3:00 p.m.  MRI of the breast is scheduled for March 21st at 3:15 p.m.  Iram is going to work on trying to get the MRI of the breast sooner.    (9) knowledge of her family history of cancer is incomplete.  A paternal aunt had breast cancer, a maternal uncle had cancer of unknown type, a sister gives a history of possible cancer.  I have requested BRCA 1 and BRCA 2 testing with an extended profile.    I am going to ask her to follow up with myself during the week of March 11th to review the study results.  I am trying to get her started on the neoadjuvant treatment towards the end of March 11th week after the port has been placed and the local site has healed sufficiently.    I have spent 1 hour interviewing and the patient and examining the patient and discussing my findings and recommendations as outlined above.  It has taken me approximately another hour to summarize the reports,  discuss with Dr. Villanueva, schedule her studies with Iram and to place this note into [facility],  On the visit at the comprehensive clinic last time.    Today 3/15/24  call for Ej Fx, await MRI of breasts, port placement.    CTHP neoadjuvant Rx q 3 weeks x's 4-6 cycles.  D1C1 4/3/24.  At the end of Carboplatin infusion, she c/o chest pain.antonette, YOVANY Whyte saw her, medicated and sx resolved.  Suspected reaction to carboplatin, first infusion.    Cytoxan, Taxotere, Herceptin, Perjeta.  5/15/24.  Adjust dosages.    I think the hernia at the incision site where the gallbladder surgery was done can probably be repaired at a later time electively and does not appear to be emergency at this point.    She did have cataract surgery in January 2024 and is due to have some follow-up procedure done as part of that management.  She does have some blurriness of her vision.  I recommended that she defer the additional cataract procedure if feasible in the near future until we get her off of the chemotherapy and steroids and her other medications as these medicines may be contributing to her visual symptoms.    Hemoglobin is 8.7 and ferritin is 21.  She has iron deficiency anemia.  She did not tolerate oral iron supplementation.  Have discussed with her iron infusion.  I would plan to give her injectafer weekly x2 weeks.  Will premedicate her with Benadryl to reduce the risk of reaction to iron infusion.  I would estimate a 1-2% risk of reaction to iron infusion.  The iron infusion will be given at the [facility] outpatient [facility] clinic.  Iron administration should facilitate red blood cell production here and improve her hemoglobin up to or close to the normal range and should improve her energy level.    Check PET CHRIS now.  Stop chemotherapy for now after 4 cycles.  Refer to Dr. Villanueva for re eval.  RTC see me 2-3 weeks.  Refer to Dr. Treviño thereafter.  Will need adjuvant H2N Rx x's 1  year.                                +

## 2024-07-03 ENCOUNTER — OFFICE VISIT (OUTPATIENT)
Dept: RADIATION ONCOLOGY | Facility: CLINIC | Age: 74
End: 2024-07-03
Payer: MEDICARE

## 2024-07-03 VITALS — BODY MASS INDEX: 22.15 KG/M2 | WEIGHT: 133.13 LBS

## 2024-07-03 DIAGNOSIS — C50.911 INVASIVE DUCTAL CARCINOMA OF BREAST, FEMALE, RIGHT: Primary | ICD-10-CM

## 2024-07-03 NOTE — PROGRESS NOTES
Dora Atwood  [id number]  1950  7/3/2024  No referring provider defined for this encounter.    REASON FOR CONSULTATION: IIIB, [id number] g2 IDC/ILC of UOQ R breast, ER(-)/IN(-)/HER2(3+)    TREATMENT GOAL: adjuvant    HISTORY OF PRESENT ILLNESS:   Dora Atwood is a 74 y.o. post-menopausal female with (+; paternal aunt) family history of breast cancer presented with palpable mass in the right axilla with diagnostic mammogram noting pleomorphic microcalcifications throughout the upper outer quadrant of the right breast covering 4 x 8 cm region of the large right axillary mass measuring 4.5 cm.  Ultrasound confirmed a 1 cm spiculated hypoechoic nodule in the right breast at 9:00, numerous skin based small hypoechoic nodules, 4.8 cm right axillary lymph node with an adjacent 2 x 3 cm lymph node.  Core needle biopsy returned g2 (6-7/9) invasive carcinoma with ductal and lobular features with associated grade 2 DCIS.  Biopsy from the right axilla returned metastatic carcinoma.  Tumor was ER negative, IN negative, HER2 3+.    She presented to [facility] at [facility] to meet with Dr. Brooks (Medical Oncology) and myself (Radiation Oncology) to formulate treatment plan, 03/04/2024.  She sees Dr. Treviño of General surgery.  Consensus recommendation was to proceed with BRCA testing, MRI breast, PET.     BRCA2(+), MIKY(+).    MRI breast measured the right breast mass with minimal enhancement at 0.9cm with 7.1 cm bulky right axillary necrotic lymphadenopathy.  There was no multifocal or contralateral disease.  PET confirmed uptake within the right breast nodule, right supraclavicular, right infraclavicular, right internal mammary and right axillary lymph node chains with no other sites of disease.  Contrasted CT of the head was negative.    She completed TCHP x4/6 cycles planned.  PET is now negative for uptake.  Patient not planning further chemotherapy and will follow up with Dr. Treviño to  discuss surgery.    Today patient reports significant fatigue and illness associated with chemotherapy that is resolving.  She still has a poor appetite and dysgeusia.  She is denying pain or discomfort within the right breast and reports good range of motion without swelling of right upper extremity.  She presents with her  who is offering excellent support.    Review of systems otherwise negative unless indicated in HPI.    Past Medical History:   Diagnosis Date    Breast cancer 01/01/2024     Past Surgical History:   Procedure Laterality Date    CATARACT EXTRACTION Bilateral 01/2024    CHOLECYSTECTOMY  2022    INSERTION OF TUNNELED CENTRAL VENOUS CATHETER (CVC) WITH SUBCUTANEOUS PORT Right 3/19/2024    Procedure: INSERTION, PORT-A-CATH;  Surgeon: Waleska Treviño MD;  Location: [facility];  Service: General;  Laterality: Right;    TUBAL LIGATION  1981     Social History     Socioeconomic History    Marital status:    Tobacco Use    Smoking status: Never    Smokeless tobacco: Never   Substance and Sexual Activity    Alcohol use: Never    Drug use: Never     Social Determinants of Health     Financial Resource Strain: Low Risk  (3/21/2024)    Overall Financial Resource Strain (CARDIA)     Difficulty of Paying Living Expenses: Not hard at all   Food Insecurity: No Food Insecurity (3/21/2024)    Hunger Vital Sign     Worried About Running Out of Food in the Last Year: Never true     Ran Out of Food in the Last Year: Never true   Transportation Needs: No Transportation Needs (3/21/2024)    PRAPARE - Transportation     Lack of Transportation (Medical): No     Lack of Transportation (Non-Medical): No   Physical Activity: Sufficiently Active (3/21/2024)    Exercise Vital Sign     Days of Exercise per Week: 6 days     Minutes of Exercise per Session: 30 min   Stress: Stress Concern Present (3/21/2024)    [employer] - Occupational Stress Questionnaire     Feeling of Stress : To some  extent   Housing Stability: Low Risk  (3/21/2024)    Housing Stability Vital Sign     Unable to Pay for Housing in the Last Year: No     Number of Places Lived in the Last Year: 1     Unstable Housing in the Last Year: No     Family History   Problem Relation Name Age of Onset    Rectal cancer Mother      Breast cancer Maternal Aunt         PRIOR HISTORY OF CHEMOTHERAPY OR RADIOTHERAPY: Please see HPI for patients prior oncologic history.    Medication List with Changes/Refills   Current Medications    DEXAMETHASONE (DECADRON) 4 MG TAB    Take 8mg the AM and PM the day before chemo and 2 days after chemo    DIPHENOXYLATE-ATROPINE 2.5-0.025 MG (LOMOTIL) 2.5-0.025 MG PER TABLET    Take 1 tablet by mouth 4 (four) times daily as needed for Diarrhea.    DUKE'S SOLN (BENADRYL 30 ML, MYLANTA 30 ML, LIDOCAINE 30 ML, NYSTATIN 30 ML) 120ML    Take 10 mLs by mouth 4 (four) times daily.    ESCITALOPRAM OXALATE (LEXAPRO) 5 MG TAB    Take 1 tablet (5 mg total) by mouth once daily.    FERROUS GLUCONATE (FERGON) 324 MG TABLET    Take 324 mg by mouth daily with breakfast.    HYDROCODONE-ACETAMINOPHEN (NORCO) 5-325 MG PER TABLET    Take 1 tablet by mouth every 6 (six) hours as needed for Pain.    HYDROCODONE-ACETAMINOPHEN (NORCO) 5-325 MG PER TABLET    Take 1 tablet by mouth every 8 (eight) hours as needed for Pain.    HYDROCODONE-ACETAMINOPHEN (NORCO) 5-325 MG PER TABLET    Take 1 tablet by mouth every 8 (eight) hours as needed for Pain.    MAGNESIUM 30 MG TAB    Take 1 tablet by mouth once daily.    MULTIVITAMIN (THERAGRAN) PER TABLET    Take 1 tablet by mouth once daily.    ONDANSETRON (ZOFRAN) 8 MG TABLET    Take 1 tablet (8 mg total) by mouth every 8 (eight) hours as needed for Nausea.    POT BICARB/POTASSIUM CIT/CA (POTASSIUM BICARBONATE ORAL)    Take 1 tablet by mouth once daily. OTC    PROMETHAZINE (PHENERGAN) 25 MG TABLET    Take 1 tablet (25 mg total) by mouth every 4 to 6 hours as needed for Nausea.    SCOPOLAMINE  (TRANSDERM-SCOP) 1.3-1.5 MG (1 MG OVER 3 DAYS)    Place 1 patch onto the skin every 72 hours.     Review of patient's allergies indicates:   Allergen Reactions    Carboplatin Other (See Comments)     Chest tightness and flushing       QUALITY OF LIFE: 80%- Normal Activity with Effort: Some Symptoms of Disease    Vitals:    07/03/24 1420   Weight: 60.4 kg (133 lb 1.6 oz)     Body mass index is 22.15 kg/m².    PHYSICAL EXAM:   GENERAL: alert; in no apparent distress.   HEAD: normocephalic, atraumatic. Alopecia  EYES: pupils are equal, round, reactive to light and accommodation. Sclera anicteric. Conjunctiva not injected.   NOSE/THROAT: no nasal erythema or rhinorrhea. Oropharynx pink, without erythema, ulcerations or thrush.   NECK: no cervical motion rigidity; supple with no masses.    CHEST: Patient is speaking comfortably on room air with normal work of breathing without using accessory muscles of respiration.  CARDIOVASCULAR: regular rate and rhythm  ABDOMEN: soft, nontender, nondistended.   MUSCULOSKELETAL: no tenderness to palpation along the spine or scapulae. Normal range of motion.  NEUROLOGIC: cranial nerves II-XII intact bilaterally. Strength 5/5 in bilateral upper and lower extremities. No sensory deficits appreciated. Normal gait.  EXTREMITIES: no clubbing, cyanosis, edema.  SKIN: no erythema, rashes, ulcerations noted.     REVIEW OF IMAGING/PATHOLOGY/LABS: Please see HPI. All images reviewed personally by dictating physician.     ASSESSMENT: Dora Atwood is a 74 y.o. female with stage IIIB, [id number] g2 IDC/ILC of UOQ R breast, ER(-)/NH(-)/HER2(3+)  PLAN:  Dora Atwood presented with palpable right axillary mass with mammogram confirming a 1 cm spiculated right breast mass, biopsy confirming a grade 2 mixed invasive ductal/lobular carcinoma also positive from the right axilla, ER/NH negative, HER2 3+. She is BRCA2(+) and MIKY(+); presently no contraindication for adjuvant radiotherapy for MIKY(+).  MRI  breast confirmed small primary site within the breast with 7 cm bulky right axillary necrotic adenopathy with PET appreciating avidity in the right supraclavicular, right infraclavicular, right internal mammary and right axillary lymph node chain.  She completed TCHP x4 cycles with poor tolerance no further therapy planned and has post treatment PET that was negative for uptake.  She will meet with Dr. Treviño to discuss surgical options and today is adamant that she wants to proceed with lumpectomy.  At presentation she had significant lymphadenopathy involving all stations in today I explained several of these will not be amenable to surgical resection.    Following surgery, I advise adjuvant radiotherapy to the right breast/chest wall and draining lymphatics to include axilla 1-3, supraclavicular fossa, internal mammary lymph node chain to 5040 cGy using 3D conformal techniques and deep inspiratory breath protocol to spare the underlying lung and heart.  We will consider additional boosting to lumpectomy site/mastectomy scar pending pathology and/or any gross residual lymph nodes if visible at CT Sim.  Diagrams provided today illustrating the 3D conformal approach.  Treatment can be done concurrent with HER2 medications.    I carefully explained the process of simulation and treatment delivery with weekly physician visits. Patient wishes to proceed.     We discussed the risks and benefits of the above treatment and have gone over in detail the acute and late toxicities of radiation therapy to the R breast/CW + RNI.     Consent deferred.  Will follow up the patient 4 weeks postoperatively.     The patient has our contact information and understands that they are free to contact us at any time with questions or concerns regarding radiation therapy.     DISPOSITION: RTC FOR CT SIM 4wks post-op     I have personally seen and evaluated this patient with a high complexity diagnosis.      60 minutes were dedicated to  reviewing/interpreting pertinent laboratory/imaging/pathology as well as prior consultations; reviewing and performing history and physical; counseling patient on oncologic recommendations; documentation in the electronic medical record including ordering of additional tests and/or radiation treatment protocol; and coordination of care with physicians with referrals placed as appropriate.    PHYSICIAN: Meño Villanueva Jr, MD    Thank you for the opportunity to meet and consult with Dora Atwood.   Please feel free to contact me to discuss the above recommendation further.

## 2024-07-18 ENCOUNTER — OFFICE VISIT (OUTPATIENT)
Facility: CLINIC | Age: 74
End: 2024-07-18
Payer: MEDICARE

## 2024-07-18 VITALS
HEIGHT: 65 IN | HEART RATE: 88 BPM | DIASTOLIC BLOOD PRESSURE: 78 MMHG | BODY MASS INDEX: 22.22 KG/M2 | SYSTOLIC BLOOD PRESSURE: 144 MMHG | WEIGHT: 133.38 LBS | TEMPERATURE: 97 F

## 2024-07-18 DIAGNOSIS — D50.8 IRON DEFICIENCY ANEMIA SECONDARY TO INADEQUATE DIETARY IRON INTAKE: ICD-10-CM

## 2024-07-18 DIAGNOSIS — C50.911 INVASIVE DUCTAL CARCINOMA OF BREAST, FEMALE, RIGHT: Primary | ICD-10-CM

## 2024-07-18 PROCEDURE — 3078F DIAST BP <80 MM HG: CPT | Mod: CPTII,S$GLB,, | Performed by: INTERNAL MEDICINE

## 2024-07-18 PROCEDURE — 99999 PR PBB SHADOW E&M-EST. PATIENT-LVL IV: CPT | Mod: PBBFAC,,, | Performed by: INTERNAL MEDICINE

## 2024-07-18 PROCEDURE — 3008F BODY MASS INDEX DOCD: CPT | Mod: CPTII,S$GLB,, | Performed by: INTERNAL MEDICINE

## 2024-07-18 PROCEDURE — 1126F AMNT PAIN NOTED NONE PRSNT: CPT | Mod: CPTII,S$GLB,, | Performed by: INTERNAL MEDICINE

## 2024-07-18 PROCEDURE — 1159F MED LIST DOCD IN RCRD: CPT | Mod: CPTII,S$GLB,, | Performed by: INTERNAL MEDICINE

## 2024-07-18 PROCEDURE — 99215 OFFICE O/P EST HI 40 MIN: CPT | Mod: S$GLB,,, | Performed by: INTERNAL MEDICINE

## 2024-07-18 PROCEDURE — G2211 COMPLEX E/M VISIT ADD ON: HCPCS | Mod: S$GLB,,, | Performed by: INTERNAL MEDICINE

## 2024-07-18 PROCEDURE — 3077F SYST BP >= 140 MM HG: CPT | Mod: CPTII,S$GLB,, | Performed by: INTERNAL MEDICINE

## 2024-07-18 PROCEDURE — 3288F FALL RISK ASSESSMENT DOCD: CPT | Mod: CPTII,S$GLB,, | Performed by: INTERNAL MEDICINE

## 2024-07-18 PROCEDURE — 1101F PT FALLS ASSESS-DOCD LE1/YR: CPT | Mod: CPTII,S$GLB,, | Performed by: INTERNAL MEDICINE

## 2024-07-21 NOTE — PROGRESS NOTES
[facility] [address] Hematology Oncology Subsequent  encounter note    7/18/24    Subjective:      Patient ID:   Dora Atwood  74 y.o. female  1950  Waleska Plasencia Ed Mannina, MD's      Chief Complaint:   R breast Cancer    HPI:   74 y.o. female palpated a hard lump at the area of the tail of the right breast and the anterior right axilla.  The mass was hard, large, but movable and not fixed.  Dr. Meza, her PMD ordered a bilateral diagnostic mammogram and ultrasound.      She has inversion of the left and right nipple, she says this is a chronic finding and her breasts have always been with the inversion.  Pleomorphic microcalcifications extending from the upper central to the upper outer quadrant of the right breast are noted and cover an area of approximately 4 x 8 cm, these are new from prior studies.  The left breast shows no masses or microcalcifications.  There is a large mass at the right axilla measuring 4.5 cm in diameter.      Ultrasound of right breast and axilla show a 1 cm mass 3 cm from the nipple at the 9 o'clock position.  In the skin of the lateral and inferior right breast there are numerous small hypoechoic nodules on the order of 3-6 mm in size.  Ultrasound of the axilla shows a large mass with irregular borders at 3 x 4.8 cm.  Just lateral to this mass is a smaller mass at 3 x 2 cm.  The radiologist recommends biopsy of the nodule at 9:00 a.m. and the large right axillary mass presumed to be a metastatic lymph node.  He notes if the biopsy of the right breast nodule fails to demonstrate concordant histologic diagnosis, then stereotactic biopsy of the microcalcifications can be performed.    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  Focal ductal carcinoma insight 2, intermediate grade, solid pattern was seen.  ERP was negative, PRP was negative, HER2 Lisa was 3+  positive    Biopsy the right axilla lymph node returned positive for metastatic carcinoma.  Residual lymphoid architecture is not seen.    She had onset of menses at age 14.  She is a  2 para 2 miscarriage 0 individual.  She delivered her 1st child at the maternal age of 25 and the 2nd child at the maternal age of 30.  She took birth control pills for approximately 10 years.  She had bilateral tubal ligation at age 30.  She went through the menopause in her mid 40s and she did take hormone replacement therapy for approximately 10 years.      Mom had rectal cancer, dad had diabetes and heart disease.  A paternal aunt had breast cancer, maternal uncle  in his 20s of some type of cancer.  A brother has heart disease.  Another brother  of an overdose, and a 3rd brother  of unknown cause.  She has a sister that she believes may have type of cancer and another sister with diabetes.  Her son and daughter are alive and well.  There is no family history known to her of ovarian, prostate, pancreas, or melanoma cancers.      CAT of head negative for metastatic dx.  ECHO Ej Fx 65-68%  MRI of breast done 3/21/24.  I spoke with Radiology, they are awaiting mamm from , should have a report by 14 days, 24.  PET uptake in mass, R supraclavicular LN, infraclavicular LN, internal mammary LN, R axillary LN, and mild increase in FGD  with skin thickening and edema.    This week, tast is improved, BM NL.  Next Rx 5/15/24, adjusted dosages.  PN at L hand resolved.  Energy 8/10  Cataract surgery 2024.     May 15th she was much improved and in better spirits.  Taste has returned to normal at this time and appetite has recovered, she actually gets hungry.  Her BMI is formed.  Today is day 1 of her 3rd cycle of chemotherapy.  And she does expect that her symptoms will recur later this weekend.  She does have her scopolamine patch in place, without nausea today, she did not have to take Zofran or Phenergan today.   She is going to take the Lomotil on Saturday x3 or 4 days preventively and then will take the Lomotil again if diarrhea symptoms recur.    Now on exam the anterior axillary lymph node is not palpable and the breast mass laterally is not palpable after 2 cycles of chemotherapy.  She did she is developing a small hernia at the site of her previous gallbladder surgery.  The hernia is nontender and easily reducible.    Her hemoglobin is 8.7 and her ferritin returned low at 21.  She could not tolerate oral iron because of GI symptoms.  She does agree to injected for or iron infusion weekly at the [facility].  There is a 1 or 2% risk of reaction to iron infusion.  Will give her Benadryl as a premedication and will observe her for 1 hour after each infusion.  Iron replenishment here will facilitate red blood cell production and improve her hemoglobin and should improve her energy.    S/P CTHP x's 4, marked fatigue, altered taste, 4 # weight loss.  For PET 6/28/24.  Defer course % to 7/3 or 7/5 after the PET.  Decide on 4 or 6 cycles of CT after reviewing Pet results.  Continue H and or P after PET.  On Venofer 4/10 to replenish Fe stores.      As of today, she has completed 6 of 10 Fe infusions, no increase in energy yet.  PET scan R breast mass resolved,  lymphadenopathy resolved after 4 cycles of chemo.  Stop chemotherapy now.  She is CR now.    She is BRCA 2 positive.  At this point, 1 option to go with right lumpectomy and sentinel node biopsy followed by adjuvant radiation therapy to the right breast and axilla and other LN areas as per Dr. Villanueva.    Another option is right mastectomy and sentinel node biopsy.  Per Dr. Villanueva's note, she would still need radiation therapy after mastectomy.    Another option would be to go with right mastectomy and sentinel node biopsy with prophylactic left mastectomy.  Estimated risk of new primary breast cancer would be 2 or 3% after  "bilateral mastectomy here.    She has decided on R partial mastectomy.  Sentinal node bx,  I believe this is a reasonable option for her.  She follows up with Dr. Treviño 5/24/24 to decide on surgery date.  She will need Rad Rx to breast, axilla, other lissette areas as per Dr. Villanueva.    If residual Dx is found, then Xeloda x's 6 months will be discussed.    She will need adjuvant H2N Rx for 1 year after surgery.      ROS:   GEN: normal without any fever, night sweats or weight loss  HEENT: normal with no HA's, sore throat, stiff neck, changes in vision  CV: normal with no CP, SOB, PND, CARNEY or orthopnea  PULM: normal with no SOB, cough, hemoptysis, sputum or pleuritic pain  GI: normal with no abdominal pain, nausea, vomiting, constipation, diarrhea, melanotic stools, BRBPR, or hematemesis  : normal with no hematuria, dysuria  BREAST: See HPI  SKIN: See HPI     Hx BTL, cholecystectomy.    Review of patient's allergies indicates:   Allergen Reactions    Carboplatin Other (See Comments)     Chest tightness and flushing       No meds.    Objective:   Vitals:  Blood pressure (!) 144/78, pulse 88, temperature 97.2 °F (36.2 °C), temperature source Temporal, height 5' 5" (1.651 m), weight 60.5 kg (133 lb 6.4 oz).    Physical Examination:   GEN: no apparent distress, comfortable  HEAD: atraumatic and normocephalic  EYES: no pallor, no icterus  ENT: no pharyngeal erythema, external ears WNL; no nasal discharge  NECK: no masses, thyroid normal, trachea midline, no LAD/LN's, supple  CV: RRR with no murmur; normal pulse  CHEST: Normal respiratory effort; CTAB; normal breath sounds; no wheeze or crackles  ABDOM: nontender and nondistended; soft; normal bowel sounds; no rebound/guarding  MUSC/Skeletal: ROM normal; no crepitus; joints normal; no deformities or arthropathy  EXTREM: no clubbing, cyanosis, inflammation or swelling  SKIN: no rashes, lesions, ulcers, petechiae or subcutaneous nodules  : no cvat  NEURO: grossly intact; " motor/sensory WNL; no tremors  PSYCH: normal mood, affect and behavior  LYMPH: normal cervical, supraclavicular, and groin LN's  BREASTS:  Left breast is nontender, without discharge, and without palpable mass, left axilla is without palpable mass or lymphadenopathy.  Right breast has edema noted as compared to the right.  She does not have peau de orange change, and skin is intact.  I do not palpate the 1 cm tumor at the 9 o'clock position of the right breast.  She has a golf ball sized mass at the tail of the breast and anterior axillary area that is hard and movable, not fixed.  I do not palpate the 2nd lymph node in the right axilla as referenced on the radiology studies.    CBC, CMP, breast tumor markers will be ordered.  Echocardiogram for ejection fraction will be ordered.  MRI of the brain with contrast for staging will be done.  MRI of the left and right breast with contrast to check for a 2nd primary in the left breast, given her lobular features, will be done.  PET scan is being done for staging, to check for metastatic disease?    Assessment:   (1) 74 y.o. female abnormal mammogram and ultrasound with 4.8 cm mass noted at the anterior axilla and tell of the breast, also a large area of new micro calcifications are seen in the upper outer quadrant of the right breast, and a 1 cm nodule is noted at the 9 o'clock position of the right breast.    (2) biopsy of the 9:00 a.m. nodule and the anterior axillary mass returned showing invasive mixed ductal and lobular carcinoma, grade 2, ERP negative, PRP negative, HER2 Lisa 3+ positive.    (3) my impression at this time is that she is at least stage II.  Staging studies will include MRI with contrast of the brain, MRI with contrast of the left and right breast, PET scan will be done to check for metastatic disease.  CBC, CMP, breast tumor markers have been requested and echocardiogram for ejection fraction has been requested.    (4) because of the negative ERP and  PRP studies, tamoxifen or Arimidex would not be expected to help adjuvantly in decreasing her long-term systemic recurrence risk.    (5) the strongly 3+ positive HER2 Lisa would support that neoadjuvant carboplatin, taxane, Herceptin, Perjeta q. 3 weeks times 4-6 cycle may achieve a CHRIS state at the breast and axillary area after treatment.    (6) she does have the large area microcalcifications at the right breast in addition to the 9:00 a.m. biopsy-proven mass at the right breast.  The initial opinion as discussed with Dr. Villanueva, would be that she would require a right mastectomy and probably a right axillary node dissection, after the completion of neoadjuvant CTHP    (7) as discussed with Dr. Villanueva, she will probably need chest irradiation and right axillary radiation, after her definitive surgery.    (8) will forward a copy of this note to Dr. Treviño, she sees him on Wednesday.  Will ask Dr. Treviño to place a carisa catheter to facilitate systemic neoadjuvant treatment.    For now the PET scan is scheduled March 6 at 3:45 p.m..  Echocardiogram is scheduled at March 8th at 2:30 p.m..  MRI of brain is scheduled at March 8th at 3:00 p.m.  MRI of the breast is scheduled for March 21st at 3:15 p.m.  Iram is going to work on trying to get the MRI of the breast sooner.    (9) knowledge of her family history of cancer is incomplete.  A paternal aunt had breast cancer, a maternal uncle had cancer of unknown type, a sister gives a history of possible cancer.  I have requested BRCA 1 and BRCA 2 testing with an extended profile.    I am going to ask her to follow up with myself during the week of March 11th to review the study results.  I am trying to get her started on the neoadjuvant treatment towards the end of March 11th week after the port has been placed and the local site has healed sufficiently.    I have spent 1 hour interviewing and the patient and examining the patient and discussing my findings and  recommendations as outlined above.  It has taken me approximately another hour to summarize the reports, discuss with Dr. Villanueva, schedule her studies with Iram and to place this note into [facility],  On the visit at the comprehensive clinic last time.    Today 3/15/24  call for Adriano Fx, await MRI of breasts, port placement.    CTHP neoadjuvant Rx q 3 weeks x's 4-6 cycles.  D1C1 4/3/24.  At the end of Carboplatin infusion, she c/o chest pain.Isabell whitman NP saw her, medicated and sx resolved.  Suspected reaction to carboplatin, first infusion.    Cytoxan, Taxotere, Herceptin, Perjeta.  5/15/24.  Adjust dosages.    I think the hernia at the incision site where the gallbladder surgery was done can probably be repaired at a later time electively and does not appear to be emergency at this point.    She did have cataract surgery in January 2024 and is due to have some follow-up procedure done as part of that management.  She does have some blurriness of her vision.  I recommended that she defer the additional cataract procedure if feasible in the near future until we get her off of the chemotherapy and steroids and her other medications as these medicines may be contributing to her visual symptoms.    Hemoglobin is 8.7 and ferritin is 21.  She has iron deficiency anemia.  She did not tolerate oral iron supplementation.  Have discussed with her iron infusion.  I would plan to give her injectafer weekly x2 weeks.  Will premedicate her with Benadryl to reduce the risk of reaction to iron infusion.  I would estimate a 1-2% risk of reaction to iron infusion.  The iron infusion will be given at the [facility] outpatient Department clinic.  Iron administration should facilitate red blood cell production here and improve her hemoglobin up to or close to the normal range and should improve her energy level.     PET CHRIS now.  Stop chemotherapy for now after 4 cycles.  See HPI discussion as above.    Will need  adjuvant H2N Rx x's 1 year.  RTC 5 weeks.                                +

## 2024-07-24 DIAGNOSIS — C50.911 MALIGNANT NEOPLASM OF RIGHT BREAST: Primary | ICD-10-CM

## 2024-07-25 ENCOUNTER — HOSPITAL ENCOUNTER (OUTPATIENT)
Dept: RADIOLOGY | Facility: HOSPITAL | Age: 74
Discharge: HOME | End: 2024-07-25
Attending: SURGERY
Payer: MEDICARE

## 2024-07-25 DIAGNOSIS — C50.911 MALIGNANT NEOPLASM OF RIGHT BREAST: ICD-10-CM

## 2024-07-25 DIAGNOSIS — R92.8 FOLLOW-UP EXAMINATION OF ABNORMAL MAMMOGRAM: ICD-10-CM

## 2024-07-25 DIAGNOSIS — C50.912 BILATERAL MALIGNANT NEOPLASM OF BREAST IN FEMALE, UNSPECIFIED ESTROGEN RECEPTOR STATUS, UNSPECIFIED SITE OF BREAST: ICD-10-CM

## 2024-07-25 DIAGNOSIS — C50.911 BILATERAL MALIGNANT NEOPLASM OF BREAST IN FEMALE, UNSPECIFIED ESTROGEN RECEPTOR STATUS, UNSPECIFIED SITE OF BREAST: ICD-10-CM

## 2024-07-25 PROCEDURE — 25000003 PHARM REV CODE 250: Performed by: SURGERY

## 2024-07-25 PROCEDURE — 77061 BREAST TOMOSYNTHESIS UNI: CPT | Mod: TC,RT

## 2024-07-25 PROCEDURE — 76882 US LMTD JT/FCL EVL NVASC XTR: CPT | Mod: TC,RT

## 2024-07-25 PROCEDURE — 77065 DX MAMMO INCL CAD UNI: CPT | Mod: TC,RT

## 2024-07-25 PROCEDURE — A4648 IMPLANTABLE TISSUE MARKER: HCPCS

## 2024-07-25 RX ORDER — LIDOCAINE HYDROCHLORIDE 10 MG/ML
20 INJECTION INFILTRATION; PERINEURAL ONCE
Status: COMPLETED | OUTPATIENT
Start: 2024-07-25 | End: 2024-07-25

## 2024-07-25 RX ADMIN — LIDOCAINE HYDROCHLORIDE 20 ML: 10 INJECTION, SOLUTION INFILTRATION; PERINEURAL at 03:07

## 2024-07-26 ENCOUNTER — HOSPITAL ENCOUNTER (OUTPATIENT)
Dept: RADIOLOGY | Facility: HOSPITAL | Age: 74
Discharge: HOME | End: 2024-07-26
Payer: MEDICARE

## 2024-07-26 ENCOUNTER — HOSPITAL ENCOUNTER (OUTPATIENT)
Dept: PREADMISSION TESTING | Facility: HOSPITAL | Age: 74
Discharge: HOME | End: 2024-07-26
Attending: SURGERY
Payer: MEDICARE

## 2024-07-26 VITALS
HEART RATE: 70 BPM | BODY MASS INDEX: 22.16 KG/M2 | OXYGEN SATURATION: 100 % | DIASTOLIC BLOOD PRESSURE: 80 MMHG | SYSTOLIC BLOOD PRESSURE: 118 MMHG | WEIGHT: 133 LBS | TEMPERATURE: 98 F | HEIGHT: 65 IN | RESPIRATION RATE: 18 BRPM

## 2024-07-26 DIAGNOSIS — Z01.818 PRE-OP TESTING: ICD-10-CM

## 2024-07-26 DIAGNOSIS — Z01.818 PRE-OP TESTING: Primary | ICD-10-CM

## 2024-07-26 DIAGNOSIS — Z41.9 SURGERY, ELECTIVE: ICD-10-CM

## 2024-07-26 PROCEDURE — 71046 X-RAY EXAM CHEST 2 VIEWS: CPT | Mod: TC

## 2024-07-26 RX ORDER — CEFAZOLIN SODIUM 2 G/50ML
2 SOLUTION INTRAVENOUS ONCE
OUTPATIENT
Start: 2024-07-30

## 2024-07-26 NOTE — PRE-PROCEDURE INSTRUCTIONS
Preadmit assessment complete. Review of pt health history and home medications.  Preop education per AVS. Pt voiced understanding

## 2024-07-26 NOTE — DISCHARGE INSTRUCTIONS
To confirm, Your doctor has instructed you that surgery is scheduled for: July 30     Pre-Op will call the afternoon prior to surgery between 4:00 and 6:00 PM with the final arrival time.       1 Person can come with you the day of surgery.    Please park in the Garage Parking and come through front entrance.      GO TO REGISTRATION     After registration, proceed past gift shop and through glass door ( [facility] [place]) Check in at the nurses station to the left.   Do not eat or drink anything after midnight the night before your surgery - THIS INCLUDES  WATER, GUM, MINTS AND CANDY.  YOU MAY BRUSH YOUR TEETH BUT DO NOT SWALLOW     TAKE ONLY THESE MEDICATIONS WITH A SMALL SIP OF WATER THE MORNING OF YOUR PROCEDURE: NONE        PLEASE NOTE:  The surgery schedule has many variables which may affect the time of your surgery case.  Family members should be available if your surgery time changes.  Plan to be here the day of your procedure between 4-6 hours.      DO NOT TAKE THESE MEDICATIONS 5-7 DAYS PRIOR to your procedure or per your surgeon's request: ASPIRIN, ALEVE, ADVIL, IBUPROFEN,  ANA SELTZER, BC , FISH OIL , VITAMIN E, HERBALS  (May take Tylenol)                                                          IMPORTANT INSTRUCTIONS      Do not smoke, vape or drink alcoholic beverages 24 hours prior to your procedure.  Shower the night before AND the morning of your procedure with a Chlorhexidine wash such as Hibiclens or Dial antibacterial soap from the neck down.   No lotions, powder or oils on your skin after you shower. DO NOT WEAR DEODORANT   Do not get it on your face or in your eyes.  You may use your own shampoo and face wash. This helps your skin to be as bacteria free as possible.    DO NOT remove hair from the surgery site.  Do not shave the incision site unless you are given specific instructions to do so.    Sleep in a bed with clean sheets.    Do not sleep with a pet in the bed.   If you wear contact  lenses, dentures, hearing aids or glasses, bring a container to put them in during surgery and give to a family member for safe keeping.    Please leave all jewelry, piercing's and valuables at home.   Wear rubber soled shoes (no flip flops).    If your doctor has scheduled you for an overnight stay, bring a small overnight bag with any personal items you need.    Make arrangements in advance for transportation home by a responsible adult.      You must make arrangements for transportation, TAXI'S, UBER'S OR LYFTS ARE NOT ALLOWED.        If you have any questions about these instructions, call (Monday - Friday) Pre-Op Admit  Nursing  at [phone] or the Pre-Op Day Surgery Unit at [phone].

## 2024-07-30 ENCOUNTER — HOSPITAL ENCOUNTER (OUTPATIENT)
Dept: RADIOLOGY | Facility: HOSPITAL | Age: 74
Discharge: HOME | End: 2024-07-30
Admitting: SURGERY
Payer: MEDICARE

## 2024-07-30 ENCOUNTER — ANESTHESIA (OUTPATIENT)
Dept: SURGERY | Facility: HOSPITAL | Age: 74
End: 2024-07-30
Payer: MEDICARE

## 2024-07-30 ENCOUNTER — ANESTHESIA EVENT (OUTPATIENT)
Dept: SURGERY | Facility: HOSPITAL | Age: 74
End: 2024-07-30
Payer: MEDICARE

## 2024-07-30 ENCOUNTER — HOSPITAL ENCOUNTER (OUTPATIENT)
Facility: HOSPITAL | Age: 74
Discharge: HOME | End: 2024-07-30
Attending: SURGERY | Admitting: SURGERY
Payer: MEDICARE

## 2024-07-30 VITALS
HEIGHT: 65 IN | BODY MASS INDEX: 22.15 KG/M2 | WEIGHT: 132.94 LBS | OXYGEN SATURATION: 96 % | DIASTOLIC BLOOD PRESSURE: 75 MMHG | SYSTOLIC BLOOD PRESSURE: 146 MMHG | TEMPERATURE: 98 F | HEART RATE: 63 BPM | RESPIRATION RATE: 16 BRPM

## 2024-07-30 DIAGNOSIS — Z01.811 PRE-OP CHEST EXAM: ICD-10-CM

## 2024-07-30 DIAGNOSIS — C50.911 INVASIVE DUCTAL CARCINOMA OF BREAST, FEMALE, RIGHT: Primary | ICD-10-CM

## 2024-07-30 DIAGNOSIS — C50.911 MALIGNANT NEOPLASM OF RIGHT BREAST: ICD-10-CM

## 2024-07-30 DIAGNOSIS — Z01.818 PRE-OP TESTING: ICD-10-CM

## 2024-07-30 LAB
OHS QRS DURATION: 118 MS
OHS QTC CALCULATION: 472 MS

## 2024-07-30 PROCEDURE — 63600175 PHARM REV CODE 636 W HCPCS: Performed by: NURSE ANESTHETIST, CERTIFIED REGISTERED

## 2024-07-30 PROCEDURE — 71000015 HC POSTOP RECOV 1ST HR: Performed by: SURGERY

## 2024-07-30 PROCEDURE — 37000009 HC ANESTHESIA EA ADD 15 MINS: Performed by: SURGERY

## 2024-07-30 PROCEDURE — 25000003 PHARM REV CODE 250: Performed by: NURSE ANESTHETIST, CERTIFIED REGISTERED

## 2024-07-30 PROCEDURE — 63600175 PHARM REV CODE 636 W HCPCS: Performed by: ANESTHESIOLOGY

## 2024-07-30 PROCEDURE — 36000706: Performed by: SURGERY

## 2024-07-30 PROCEDURE — 37000008 HC ANESTHESIA 1ST 15 MINUTES: Performed by: SURGERY

## 2024-07-30 PROCEDURE — 27201423 OPTIME MED/SURG SUP & DEVICES STERILE SUPPLY: Performed by: SURGERY

## 2024-07-30 PROCEDURE — A4648 IMPLANTABLE TISSUE MARKER: HCPCS | Performed by: SURGERY

## 2024-07-30 PROCEDURE — 38792 RA TRACER ID OF SENTINL NODE: CPT | Mod: TC

## 2024-07-30 PROCEDURE — 71000033 HC RECOVERY, INTIAL HOUR: Performed by: SURGERY

## 2024-07-30 PROCEDURE — C9290 INJ, BUPIVACAINE LIPOSOME: HCPCS | Performed by: SURGERY

## 2024-07-30 PROCEDURE — 71000039 HC RECOVERY, EACH ADD'L HOUR: Performed by: SURGERY

## 2024-07-30 PROCEDURE — 25000003 PHARM REV CODE 250: Performed by: ANESTHESIOLOGY

## 2024-07-30 PROCEDURE — 36000707: Performed by: SURGERY

## 2024-07-30 PROCEDURE — A9541 TC99M SULFUR COLLOID: HCPCS

## 2024-07-30 PROCEDURE — 63600175 PHARM REV CODE 636 W HCPCS: Performed by: SURGERY

## 2024-07-30 PROCEDURE — 25000003 PHARM REV CODE 250: Performed by: SURGERY

## 2024-07-30 PROCEDURE — 88307 TISSUE EXAM BY PATHOLOGIST: CPT | Mod: TC,59 | Performed by: PATHOLOGY

## 2024-07-30 DEVICE — IMPLANTABLE DEVICE: Type: IMPLANTABLE DEVICE | Site: BREAST | Status: FUNCTIONAL

## 2024-07-30 RX ORDER — SODIUM CHLORIDE, SODIUM LACTATE, POTASSIUM CHLORIDE, CALCIUM CHLORIDE 600; 310; 30; 20 MG/100ML; MG/100ML; MG/100ML; MG/100ML
INJECTION, SOLUTION INTRAVENOUS CONTINUOUS PRN
Status: DISCONTINUED | OUTPATIENT
Start: 2024-07-30 | End: 2024-07-30

## 2024-07-30 RX ORDER — SUCCINYLCHOLINE CHLORIDE 20 MG/ML
INJECTION INTRAMUSCULAR; INTRAVENOUS
Status: DISCONTINUED | OUTPATIENT
Start: 2024-07-30 | End: 2024-07-30

## 2024-07-30 RX ORDER — DEXAMETHASONE SODIUM PHOSPHATE 4 MG/ML
INJECTION, SOLUTION INTRA-ARTICULAR; INTRALESIONAL; INTRAMUSCULAR; INTRAVENOUS; SOFT TISSUE
Status: DISCONTINUED | OUTPATIENT
Start: 2024-07-30 | End: 2024-07-30

## 2024-07-30 RX ORDER — MEPERIDINE HYDROCHLORIDE 50 MG/ML
12.5 INJECTION INTRAMUSCULAR; INTRAVENOUS; SUBCUTANEOUS EVERY 10 MIN PRN
Status: DISCONTINUED | OUTPATIENT
Start: 2024-07-30 | End: 2024-07-30 | Stop reason: HOSPADM

## 2024-07-30 RX ORDER — GLUCAGON 1 MG
1 KIT INJECTION
Status: DISCONTINUED | OUTPATIENT
Start: 2024-07-30 | End: 2024-07-30 | Stop reason: HOSPADM

## 2024-07-30 RX ORDER — PROPOFOL 10 MG/ML
VIAL (ML) INTRAVENOUS
Status: DISCONTINUED | OUTPATIENT
Start: 2024-07-30 | End: 2024-07-30

## 2024-07-30 RX ORDER — ROCURONIUM BROMIDE 10 MG/ML
INJECTION, SOLUTION INTRAVENOUS
Status: DISCONTINUED | OUTPATIENT
Start: 2024-07-30 | End: 2024-07-30

## 2024-07-30 RX ORDER — ACETAMINOPHEN 325 MG/1
650 TABLET ORAL EVERY 4 HOURS PRN
Status: DISCONTINUED | OUTPATIENT
Start: 2024-07-30 | End: 2024-07-30 | Stop reason: HOSPADM

## 2024-07-30 RX ORDER — DIPHENHYDRAMINE HYDROCHLORIDE 50 MG/ML
12.5 INJECTION INTRAMUSCULAR; INTRAVENOUS
Status: DISCONTINUED | OUTPATIENT
Start: 2024-07-30 | End: 2024-07-30 | Stop reason: HOSPADM

## 2024-07-30 RX ORDER — HYDROCODONE BITARTRATE AND ACETAMINOPHEN 5; 325 MG/1; MG/1
1 TABLET ORAL EVERY 8 HOURS PRN
Qty: 15 TABLET | Refills: 0 | Status: SHIPPED | OUTPATIENT
Start: 2024-07-30

## 2024-07-30 RX ORDER — DEXMEDETOMIDINE HYDROCHLORIDE 100 UG/ML
INJECTION, SOLUTION INTRAVENOUS
Status: DISCONTINUED | OUTPATIENT
Start: 2024-07-30 | End: 2024-07-30

## 2024-07-30 RX ORDER — PHENYLEPHRINE HYDROCHLORIDE 10 MG/ML
INJECTION INTRAVENOUS
Status: DISCONTINUED | OUTPATIENT
Start: 2024-07-30 | End: 2024-07-30

## 2024-07-30 RX ORDER — FAMOTIDINE 10 MG/ML
INJECTION INTRAVENOUS
Status: DISCONTINUED | OUTPATIENT
Start: 2024-07-30 | End: 2024-07-30

## 2024-07-30 RX ORDER — ONDANSETRON HYDROCHLORIDE 2 MG/ML
4 INJECTION, SOLUTION INTRAVENOUS DAILY PRN
Status: DISCONTINUED | OUTPATIENT
Start: 2024-07-30 | End: 2024-07-30 | Stop reason: HOSPADM

## 2024-07-30 RX ORDER — BUPIVACAINE HYDROCHLORIDE AND EPINEPHRINE 5; 5 MG/ML; UG/ML
INJECTION, SOLUTION EPIDURAL; INTRACAUDAL; PERINEURAL
Status: DISCONTINUED | OUTPATIENT
Start: 2024-07-30 | End: 2024-07-30 | Stop reason: HOSPADM

## 2024-07-30 RX ORDER — ONDANSETRON HYDROCHLORIDE 2 MG/ML
4 INJECTION, SOLUTION INTRAVENOUS EVERY 12 HOURS PRN
Status: DISCONTINUED | OUTPATIENT
Start: 2024-07-30 | End: 2024-07-30 | Stop reason: HOSPADM

## 2024-07-30 RX ORDER — LIDOCAINE HYDROCHLORIDE 10 MG/ML
INJECTION, SOLUTION EPIDURAL; INFILTRATION; INTRACAUDAL; PERINEURAL
Status: DISCONTINUED | OUTPATIENT
Start: 2024-07-30 | End: 2024-07-30

## 2024-07-30 RX ORDER — TECHNETIUM TC 99M SULFUR COLLOID 2 MG
1 KIT MISCELLANEOUS
Status: COMPLETED | OUTPATIENT
Start: 2024-07-30 | End: 2024-07-30

## 2024-07-30 RX ORDER — OXYCODONE HYDROCHLORIDE 5 MG/1
5 TABLET ORAL
Status: DISCONTINUED | OUTPATIENT
Start: 2024-07-30 | End: 2024-07-30 | Stop reason: HOSPADM

## 2024-07-30 RX ORDER — CEFAZOLIN SODIUM 2 G/50ML
2 SOLUTION INTRAVENOUS ONCE
Status: COMPLETED | OUTPATIENT
Start: 2024-07-30 | End: 2024-07-30

## 2024-07-30 RX ORDER — ACETAMINOPHEN 10 MG/ML
1000 INJECTION, SOLUTION INTRAVENOUS ONCE
Status: CANCELLED | OUTPATIENT
Start: 2024-07-30 | End: 2024-07-30

## 2024-07-30 RX ORDER — ACETAMINOPHEN 10 MG/ML
INJECTION, SOLUTION INTRAVENOUS
Status: DISCONTINUED | OUTPATIENT
Start: 2024-07-30 | End: 2024-07-30

## 2024-07-30 RX ORDER — ONDANSETRON HYDROCHLORIDE 2 MG/ML
INJECTION, SOLUTION INTRAMUSCULAR; INTRAVENOUS
Status: DISCONTINUED | OUTPATIENT
Start: 2024-07-30 | End: 2024-07-30

## 2024-07-30 RX ORDER — HYDROMORPHONE HYDROCHLORIDE 1 MG/ML
0.2 INJECTION, SOLUTION INTRAMUSCULAR; INTRAVENOUS; SUBCUTANEOUS EVERY 5 MIN PRN
Status: DISCONTINUED | OUTPATIENT
Start: 2024-07-30 | End: 2024-07-30 | Stop reason: HOSPADM

## 2024-07-30 RX ORDER — FENTANYL CITRATE 50 UG/ML
INJECTION, SOLUTION INTRAMUSCULAR; INTRAVENOUS
Status: DISCONTINUED | OUTPATIENT
Start: 2024-07-30 | End: 2024-07-30

## 2024-07-30 RX ORDER — HYDROCODONE BITARTRATE AND ACETAMINOPHEN 5; 325 MG/1; MG/1
1 TABLET ORAL EVERY 6 HOURS PRN
Status: DISCONTINUED | OUTPATIENT
Start: 2024-07-30 | End: 2024-07-30 | Stop reason: HOSPADM

## 2024-07-30 RX ADMIN — ACETAMINOPHEN 1000 MG: 10 INJECTION, SOLUTION INTRAVENOUS at 09:07

## 2024-07-30 RX ADMIN — PROPOFOL 150 MG: 10 INJECTION, EMULSION INTRAVENOUS at 09:07

## 2024-07-30 RX ADMIN — FAMOTIDINE 20 MG: 10 INJECTION, SOLUTION INTRAVENOUS at 09:07

## 2024-07-30 RX ADMIN — DEXMEDETOMIDINE HYDROCHLORIDE 10 MCG: 100 INJECTION, SOLUTION INTRAVENOUS at 09:07

## 2024-07-30 RX ADMIN — Medication 120 MG: at 09:07

## 2024-07-30 RX ADMIN — HYDROMORPHONE HYDROCHLORIDE 0.2 MG: 1 INJECTION, SOLUTION INTRAMUSCULAR; INTRAVENOUS; SUBCUTANEOUS at 12:07

## 2024-07-30 RX ADMIN — LIDOCAINE HYDROCHLORIDE 50 MG: 10 INJECTION, SOLUTION EPIDURAL; INFILTRATION; INTRACAUDAL; PERINEURAL at 09:07

## 2024-07-30 RX ADMIN — ONDANSETRON 4 MG: 2 INJECTION INTRAMUSCULAR; INTRAVENOUS at 09:07

## 2024-07-30 RX ADMIN — ROCURONIUM BROMIDE 5 MG: 10 INJECTION, SOLUTION INTRAVENOUS at 09:07

## 2024-07-30 RX ADMIN — DEXAMETHASONE SODIUM PHOSPHATE 8 MG: 4 INJECTION, SOLUTION INTRAMUSCULAR; INTRAVENOUS at 09:07

## 2024-07-30 RX ADMIN — CEFAZOLIN SODIUM 2 G: 2 SOLUTION INTRAVENOUS at 09:07

## 2024-07-30 RX ADMIN — OXYCODONE HYDROCHLORIDE 5 MG: 5 TABLET ORAL at 12:07

## 2024-07-30 RX ADMIN — SODIUM CHLORIDE, SODIUM LACTATE, POTASSIUM CHLORIDE, AND CALCIUM CHLORIDE: .6; .31; .03; .02 INJECTION, SOLUTION INTRAVENOUS at 09:07

## 2024-07-30 RX ADMIN — SODIUM CHLORIDE, SODIUM LACTATE, POTASSIUM CHLORIDE, AND CALCIUM CHLORIDE: .6; .31; .03; .02 INJECTION, SOLUTION INTRAVENOUS at 11:07

## 2024-07-30 RX ADMIN — SUGAMMADEX 200 MG: 100 INJECTION, SOLUTION INTRAVENOUS at 11:07

## 2024-07-30 RX ADMIN — TECHNETIUM TC 99M SULFUR COLLOID KIT 1 MILLICURIE: KIT at 08:07

## 2024-07-30 RX ADMIN — PHENYLEPHRINE HYDROCHLORIDE 100 MCG: 10 INJECTION INTRAVENOUS at 10:07

## 2024-07-30 RX ADMIN — FENTANYL CITRATE 100 MCG: 50 INJECTION, SOLUTION INTRAMUSCULAR; INTRAVENOUS at 09:07

## 2024-07-30 RX ADMIN — ROCURONIUM BROMIDE 35 MG: 10 INJECTION, SOLUTION INTRAVENOUS at 09:07

## 2024-07-30 NOTE — ANESTHESIA PROCEDURE NOTES
Intubation    Date/Time: 7/30/2024 9:41 AM    Performed by: Rhona Rodriguez CRNA  Authorized by: Duong Estrada MD    Intubation:     Induction:  Intravenous    Intubated:  Postinduction    Mask Ventilation:  Easy mask    Attempted By:  CRNA    Method of Intubation:  Video laryngoscopy    Blade:  Ladd 3    Laryngeal View Grade: Grade I - full view of cords      Difficult Airway Encountered?: No      Complications:  None    Airway Device:  Oral endotracheal tube    Airway Device Size:  7.5    Style/Cuff Inflation:  Cuffed (inflated to minimal occlusive pressure)    Tube secured:  22    Secured at:  The lips    Placement Verified By:  Capnometry    Complicating Factors:  None    Findings Post-Intubation:  BS equal bilateral and atraumatic/condition of teeth unchanged

## 2024-07-30 NOTE — PLAN OF CARE
Patient taken to day surgery via stretcher at this time. Awake and alert not distressful. Radha RN at bedside to assume care of patient

## 2024-07-30 NOTE — OP NOTE
[facility]  Surgery Department  Operative Note        Date of Procedure: 7/30/2024     Procedure: Procedure(s) (LRB):  MASTECTOMY, PARTIAL (Right)  INJECTION, FOR SENTINEL NODE IDENTIFICATION (Right)     Surgeons and Role:     * Waleska Treviño MD - Primary    Assisting Surgeon: None    Pre-Operative Diagnosis: Malignant neoplasm of right female breast, unspecified estrogen receptor status, unspecified site of breast [C50.911]    Post-Operative Diagnosis: Post-Op Diagnosis Codes:     * Malignant neoplasm of right female breast, unspecified estrogen receptor status, unspecified site of breast [C50.911]    Anesthesia: General    Description of the Procedure:  Patient was placed supine on operating table under satisfactory general anesthesia.  The right arm axilla breast and chest were prepped and draped in sterile fashion.  Using the SYDNIE  the patient's area of maximum intensity was at the areolar margin 11 o'clock position.  I made a circumareolar incision from 9-3.  Developed a skin flap between this subcutaneous fat and breast tissue circumferentially.  Patient had an inverted nipple chronically.  The subareolar tissue was dissected as well.  Once I had developed good plane circumferentially for several cm I placed a clip on the area of maximum intensity and then a generous excision of breast tissue down to the chest wall taking wide margins.  The specimen was oriented with colors /clips and sent to x-ray which confirmed the clip was within the specimen.  I felt that I had adequate margins.  BioZorb was sewn in above the chest wall with interrupted Vicryl sutures.  The breast tissue was then reapproximated over the BioZorb interrupted 2-0 Vicryl sutures.  I then closed the subQ with 4-0 Vicryl and the skin with 4-0 Stratafix.  All layers were infiltrated with Exparel and Marcaine.  Then made a transverse incision in the axilla.  The sentinel node injection did not work well as is commonly seen in  patients with neoadjuvant chemotherapy.  I excised the lower axillary contents including the area increased background counts with the gamma counter.  The specimen was excised with the cautery.  Once  specimen was removed it had elevated counts on the back table and was consistent with sentinel nodes.  There was no palpable axillary adenopathy.  With minimal to no counts the remaining in the axilla no other tissue was excised.  Kalyan-Bradford drain was placed through an inferior stab wound.  The axillary tissue was closed in layers with Vicryl in 2 layers and then 4-0 Stratafix on the skin.  Patient tolerated procedure well indication    Complications: None    Estimated Blood Loss (EBL): * No values recorded between 7/30/2024  9:57 AM and 7/30/2024 11:21 AM *           Implants: * No implants in log *    Specimens:   Specimen (24h ago, onward)       Start     Ordered    07/30/24 1103  Specimen to Pathology - Surgery  Once        Comments: Pre-op Diagnosis: Malignant neoplasm of right female breast, unspecified estrogen receptor status, unspecified site of breast [C50.911]Procedure(s):MASTECTOMY, PARTIALINJECTION, FOR SENTINEL NODE IDENTIFICATION Number of specimens: 2Name of specimens: 1- right breast cancer - fresh-first to radiology,   2- right axillary sentinel nodes- in formalin     Question:  Release to patient  Answer:  Immediate    07/30/24 1107                            Condition: Good    Disposition: PACU    Attestation: I was present and scrubbed in for the entire procedure.                          Discharge Summary      Discharged Condition: Good    Discharge Diagnosis: Malignant neoplasm of right female breast, unspecified estrogen receptor status, unspecified site of breast [C50.911]    Treatments/Procedures: Procedure(s) (LRB):  MASTECTOMY, PARTIAL (Right)  INJECTION, FOR SENTINEL NODE IDENTIFICATION (Right)    Hospital Course: Uneventful; Discharged home from Recovery    Significant Diagnostic  Studies: none    Disposition: Home or Self Care testing testing    Diet: Resume pre procedure diet    Follow up: Office 7-10 days    Activity: No heavy lifting untill seen in office.    Patient Instructions:   Call MD For   Persistent nausea and vomiting;   Severe uncontrolled pain;   Difficulty breathing;   Redness, tenderness or signs of infection at the surgical site (pain, redness, swelling, odor, green/yellow discharge around incision)        Waleska Treviño MD  Date of Service: 07/30/2024  11:21 AM

## 2024-07-30 NOTE — ANESTHESIA PREPROCEDURE EVALUATION
07/30/2024  Dora Atwood is a 74 y.o., female.    Patient Active Problem List   Diagnosis    Invasive ductal carcinoma of breast, female, right    Chemotherapy induced neutropenia    Iron deficiency anemia, unspecified       Past Surgical History:   Procedure Laterality Date    CATARACT EXTRACTION Bilateral 01/2024    CHOLECYSTECTOMY  2022    INSERTION OF TUNNELED CENTRAL VENOUS CATHETER (CVC) WITH SUBCUTANEOUS PORT Right 3/19/2024    Procedure: INSERTION, PORT-A-CATH;  Surgeon: Waleska Treviño MD;  Location: [facility];  Service: General;  Laterality: Right;    TUBAL LIGATION  1981        Tobacco Use:  The patient  reports that she has never smoked. She has never used smokeless tobacco.     Results for orders placed or performed during the hospital encounter of 03/14/24   EKG 12-lead    Collection Time: 03/14/24  3:24 PM   Result Value Ref Range    QRS Duration 116 ms    OHS QTC Calculation 468 ms    Narrative    Test Reason : Z01.818,    Vent. Rate : 076 BPM     Atrial Rate : 076 BPM     P-R Int : 148 ms          QRS Dur : 116 ms      QT Int : 416 ms       P-R-T Axes : 067 -05 125 degrees     QTc Int : 468 ms    Normal sinus rhythm Incomplete left bundle branch block  Possible Anterior infarct ,age undetermined  ST and T wave abnormality, consider lateral ischemia  Abnormal ECG  No previous ECGs available  Confirmed by Román HATFIELD, Escobar MARTIN ([id number]) on 3/31/2024 3:33:51 PM    Referred By:             Confirmed By:Escobar France MD             Lab Results   Component Value Date    WBC 4.19 07/26/2024    HGB 12.3 07/26/2024    HCT 39.2 07/26/2024    MCV 94 07/26/2024     07/26/2024     BMP  Lab Results   Component Value Date     07/26/2024    K 4.0 07/26/2024     07/26/2024    CO2 27 07/26/2024    BUN 13 07/26/2024    CREATININE 0.6 07/26/2024    CALCIUM 9.4 07/26/2024    ANIONGAP 9 07/26/2024     GLU 85 07/26/2024     (H) 03/14/2024       Results for orders placed in visit on 03/08/24    Echo    Interpretation Summary    Left Ventricle: The left ventricle is normal in size. Mildly increased wall thickness. There is mild concentric hypertrophy. There is normal systolic function. There is normal diastolic function.    Right Ventricle: Normal right ventricular cavity size. Wall thickness is normal. Right ventricle wall motion  is normal. Systolic function is normal.    Tricuspid Valve: There is mild regurgitation with a centrally directed jet.    IVC/SVC: Intermediate venous pressure at 8 mmHg.            Pre-op Assessment    I have reviewed the Patient Summary Reports.     I have reviewed the Nursing Notes. I have reviewed the NPO Status.   I have reviewed the Medications.     Review of Systems  Anesthesia Hx:  No problems with previous Anesthesia             Denies Family Hx of Anesthesia complications.    Denies Personal Hx of Anesthesia complications.                    Social:  Non-Smoker       Hematology/Oncology:       -- Anemia:                  Current/Recent Cancer. (Right)  -- :      Oncology: right. Breast    right   chemotherapy       EENT/Dental:  EENT/Dental Normal           Cardiovascular:  Cardiovascular Normal                                            Pulmonary:  Pulmonary Normal                       Renal/:  Renal/ Normal                 Hepatic/GI:    Hiatal Hernia,              Musculoskeletal:  Musculoskeletal Normal                Neurological:  Neurology Normal                                      Endocrine:  Endocrine Normal            Psych:  Psychiatric Normal                    Physical Exam  General: Well nourished    Airway:  Mallampati: II   Mouth Opening: Normal  TM Distance: Normal  Tongue: Normal  Neck ROM: Normal ROM    Dental:  Intact    Chest/Lungs:  Clear to auscultation, Normal Respiratory Rate    Heart:  Rate: Normal  Rhythm: Regular  Rhythm        Anesthesia Plan  Type of Anesthesia, risks & benefits discussed:    Anesthesia Type: Gen ETT  Intra-op Monitoring Plan: Standard ASA Monitors  Post Op Pain Control Plan: IV/PO Opioids PRN and multimodal analgesia  Induction:  IV  Airway Plan: Video  Informed Consent: Informed consent signed with the Patient and all parties understand the risks and agree with anesthesia plan.  All questions answered.   ASA Score: 2  Anesthesia Plan Notes:   GETA  IV tylenol  Zofran Decadron Pepcid    Ready For Surgery From Anesthesia Perspective.     .

## 2024-07-30 NOTE — TRANSFER OF CARE
"Anesthesia Transfer of Care Note    Patient: Dora Atwood    Procedure(s) Performed: Procedure(s) (LRB):  MASTECTOMY, PARTIAL (Right)  INJECTION, FOR SENTINEL NODE IDENTIFICATION (Right)    Patient location: PACU    Anesthesia Type: general    Transport from OR: Transported from OR on room air with adequate spontaneous ventilation    Post pain: adequate analgesia    Post assessment: no apparent anesthetic complications and tolerated procedure well    Post vital signs: stable    Level of consciousness: responds to stimulation    Nausea/Vomiting: no nausea/vomiting    Complications: none    Transfer of care protocol was followed      Last vitals: Visit Vitals  /79 (BP Location: Right arm, Patient Position: Lying)   Pulse 70   Temp 36.5 °C (97.7 °F) (Oral)   Resp 16   Ht 5' 5" (1.651 m)   Wt 60.3 kg (132 lb 15 oz)   SpO2 97%   Breastfeeding No   BMI 22.12 kg/m²     "

## 2024-07-30 NOTE — ANESTHESIA POSTPROCEDURE EVALUATION
Anesthesia Post Evaluation    Patient: Dora Atwood    Procedure(s) Performed: Procedure(s) (LRB):  MASTECTOMY, PARTIAL (Right)  INJECTION, FOR SENTINEL NODE IDENTIFICATION (Right)    Final Anesthesia Type: general      Patient location during evaluation: PACU  Patient participation: Yes- Able to Participate  Level of consciousness: awake and alert  Post-procedure vital signs: reviewed and stable  Pain management: adequate  Airway patency: patent    PONV status at discharge: No PONV  Anesthetic complications: no      Cardiovascular status: stable  Respiratory status: unassisted and spontaneous ventilation  Hydration status: euvolemic  Follow-up not needed.              Vitals Value Taken Time   /73 07/30/24 1245   Temp 36.1 °C (97 °F) 07/30/24 1200   Pulse 69 07/30/24 1247   Resp 15 07/30/24 1247   SpO2 96 % 07/30/24 1247   Vitals shown include unfiled device data.      No case tracking events are documented in the log.      Pain/Isaac Score: Pain Rating Prior to Med Admin: 6 (7/30/2024 12:25 PM)  Isaac Score: 10 (7/30/2024 12:45 PM)

## 2024-08-07 DIAGNOSIS — R92.8 ABNORMAL MAMMOGRAM: Primary | ICD-10-CM

## 2024-08-13 ENCOUNTER — HOSPITAL ENCOUNTER (OUTPATIENT)
Dept: RADIOLOGY | Facility: HOSPITAL | Age: 74
Discharge: HOME | End: 2024-08-13
Attending: SURGERY
Payer: MEDICARE

## 2024-08-13 DIAGNOSIS — R92.8 ABNORMAL MAMMOGRAM: ICD-10-CM

## 2024-08-13 PROCEDURE — 19081 BX BREAST 1ST LESION STRTCTC: CPT | Mod: RT,,, | Performed by: RADIOLOGY

## 2024-08-13 PROCEDURE — 27202663 MAMMO BREAST STEREOTACTIC BREAST BIOPSY RIGHT

## 2024-08-13 PROCEDURE — 76098 X-RAY EXAM SURGICAL SPECIMEN: CPT | Mod: TC

## 2024-08-13 PROCEDURE — 25000003 PHARM REV CODE 250

## 2024-08-13 PROCEDURE — 19081 BX BREAST 1ST LESION STRTCTC: CPT | Mod: RT

## 2024-08-13 PROCEDURE — 76098 X-RAY EXAM SURGICAL SPECIMEN: CPT | Mod: 26,,, | Performed by: RADIOLOGY

## 2024-08-13 RX ADMIN — LIDOCAINE HYDROCHLORIDE: 10; .005 INJECTION, SOLUTION EPIDURAL; INFILTRATION; INTRACAUDAL; PERINEURAL at 01:08

## 2024-08-22 ENCOUNTER — PATIENT MESSAGE (OUTPATIENT)
Dept: RADIATION ONCOLOGY | Facility: CLINIC | Age: 74
End: 2024-08-22

## 2024-08-29 ENCOUNTER — OFFICE VISIT (OUTPATIENT)
Facility: CLINIC | Age: 74
End: 2024-08-29
Payer: MEDICARE

## 2024-08-29 VITALS
WEIGHT: 135 LBS | BODY MASS INDEX: 22.47 KG/M2 | SYSTOLIC BLOOD PRESSURE: 148 MMHG | RESPIRATION RATE: 16 BRPM | DIASTOLIC BLOOD PRESSURE: 82 MMHG | HEART RATE: 84 BPM | TEMPERATURE: 98 F

## 2024-08-29 DIAGNOSIS — C50.911 INVASIVE DUCTAL CARCINOMA OF BREAST, FEMALE, RIGHT: Primary | ICD-10-CM

## 2024-08-29 PROCEDURE — 1159F MED LIST DOCD IN RCRD: CPT | Mod: CPTII,S$GLB,, | Performed by: INTERNAL MEDICINE

## 2024-08-29 PROCEDURE — 3079F DIAST BP 80-89 MM HG: CPT | Mod: CPTII,S$GLB,, | Performed by: INTERNAL MEDICINE

## 2024-08-29 PROCEDURE — 3008F BODY MASS INDEX DOCD: CPT | Mod: CPTII,S$GLB,, | Performed by: INTERNAL MEDICINE

## 2024-08-29 PROCEDURE — 1126F AMNT PAIN NOTED NONE PRSNT: CPT | Mod: CPTII,S$GLB,, | Performed by: INTERNAL MEDICINE

## 2024-08-29 PROCEDURE — 3077F SYST BP >= 140 MM HG: CPT | Mod: CPTII,S$GLB,, | Performed by: INTERNAL MEDICINE

## 2024-08-29 PROCEDURE — G2211 COMPLEX E/M VISIT ADD ON: HCPCS | Mod: S$GLB,,, | Performed by: INTERNAL MEDICINE

## 2024-08-29 PROCEDURE — 99999 PR PBB SHADOW E&M-EST. PATIENT-LVL III: CPT | Mod: PBBFAC,,, | Performed by: INTERNAL MEDICINE

## 2024-08-29 PROCEDURE — 3288F FALL RISK ASSESSMENT DOCD: CPT | Mod: CPTII,S$GLB,, | Performed by: INTERNAL MEDICINE

## 2024-08-29 PROCEDURE — 99215 OFFICE O/P EST HI 40 MIN: CPT | Mod: S$GLB,,, | Performed by: INTERNAL MEDICINE

## 2024-08-29 PROCEDURE — 1101F PT FALLS ASSESS-DOCD LE1/YR: CPT | Mod: CPTII,S$GLB,, | Performed by: INTERNAL MEDICINE

## 2024-08-29 NOTE — LETTER
September 2, 2024        Demarco Meza MD  [address]  [address]  [place] [address]             [facility]  [address]  [facility] [address]  Phone: [phone]  Fax: [phone]   Patient: Dora Atwood   MR Number: [id number]   YOB: 1950   Date of Visit: 8/29/2024       Dear Dr. Meza:    Thank you for referring Dora Atwood to me for evaluation. Below are the relevant portions of my assessment and plan of care.            If you have questions, please do not hesitate to call me. I look forward to following Dora along with you.    Sincerely,      SIMONE Brooks MD           CC    No Recipients

## 2024-09-03 NOTE — PROGRESS NOTES
[facility] [address] Hematology Oncology Subsequent  encounter note    8/29/24    Subjective:      Patient ID:   Dora Atwood  74 y.o. female  1950  Waleska Plasencia Ed Mannina, MD's      Chief Complaint:   R breast Cancer    HPI:   74 y.o. female palpated a hard lump at the area of the tail of the right breast and the anterior right axilla.  The mass was hard, large, but movable and not fixed.  Dr. Meza, her PMD ordered a bilateral diagnostic mammogram and ultrasound.      She has inversion of the left and right nipple, she says this is a chronic finding and her breasts have always been with the inversion.  Pleomorphic microcalcifications extending from the upper central to the upper outer quadrant of the right breast are noted and cover an area of approximately 4 x 8 cm, these are new from prior studies.  The left breast shows no masses or microcalcifications.  There is a large mass at the right axilla measuring 4.5 cm in diameter.      Ultrasound of right breast and axilla show a 1 cm mass 3 cm from the nipple at the 9 o'clock position.  In the skin of the lateral and inferior right breast there are numerous small hypoechoic nodules on the order of 3-6 mm in size.  Ultrasound of the axilla shows a large mass with irregular borders at 3 x 4.8 cm.  Just lateral to this mass is a smaller mass at 3 x 2 cm.  The radiologist recommends biopsy of the nodule at 9:00 a.m. and the large right axillary mass presumed to be a metastatic lymph node.  He notes if the biopsy of the right breast nodule fails to demonstrate concordant histologic diagnosis, then stereotactic biopsy of the microcalcifications can be performed.    Right breast biopsy at 9:00 o'clock  showed invasive carcinoma with mixed ductal and lobular features, Maidens grade 2, measuring 8 mm on biopsy.  Focal ductal carcinoma insight 2, intermediate grade, solid pattern was seen.  ERP was negative, PRP was negative, HER2 Lisa was 3+  positive    Biopsy the right axilla lymph node returned positive for metastatic carcinoma.  Residual lymphoid architecture is not seen.    She had onset of menses at age 14.  She is a  2 para 2 miscarriage 0 individual.  She delivered her 1st child at the maternal age of 25 and the 2nd child at the maternal age of 30.  She took birth control pills for approximately 10 years.  She had bilateral tubal ligation at age 30.  She went through the menopause in her mid 40s and she did take hormone replacement therapy for approximately 10 years.      Mom had rectal cancer, dad had diabetes and heart disease.  A paternal aunt had breast cancer, maternal uncle  in his 20s of some type of cancer.  A brother has heart disease.  Another brother  of an overdose, and a 3rd brother  of unknown cause.  She has a sister that she believes may have type of cancer and another sister with diabetes.  Her son and daughter are alive and well.  There is no family history known to her of ovarian, prostate, pancreas, or melanoma cancers.      CAT of head negative for metastatic dx.  ECHO Ej Fx 65-68%  MRI of breast done 3/21/24.  I spoke with Radiology, they are awaiting mamm from , should have a report by 14 days, 24.  PET uptake in mass, R supraclavicular LN, infraclavicular LN, internal mammary LN, R axillary LN, and mild increase in FGD  with skin thickening and edema.    This week, tast is improved, BM NL.  Next Rx 5/15/24, adjusted dosages.  PN at L hand resolved.  Energy 8/10  Cataract surgery 2024.     May 15th she was much improved and in better spirits.  Taste has returned to normal at this time and appetite has recovered, she actually gets hungry.  Her BMI is formed.  Today is day 1 of her 3rd cycle of chemotherapy.  And she does expect that her symptoms will recur later this weekend.  She does have her scopolamine patch in place, without nausea today, she did not have to take Zofran or Phenergan today.   She is going to take the Lomotil on Saturday x3 or 4 days preventively and then will take the Lomotil again if diarrhea symptoms recur.    Now on exam the anterior axillary lymph node is not palpable and the breast mass laterally is not palpable after 2 cycles of chemotherapy.  She did she is developing a small hernia at the site of her previous gallbladder surgery.  The hernia is nontender and easily reducible.    Her hemoglobin is 8.7 and her ferritin returned low at 21.  She could not tolerate oral iron because of GI symptoms.  She does agree to injected for or iron infusion weekly at the [facility] outpatient [facility].  There is a 1 or 2% risk of reaction to iron infusion.  Will give her Benadryl as a premedication and will observe her for 1 hour after each infusion.  Iron replenishment here will facilitate red blood cell production and improve her hemoglobin and should improve her energy.    S/P CTHP x's 4, marked fatigue, altered taste, 4 # weight loss.  For PET 6/28/24.  Defer course % to 7/3 or 7/5 after the PET.  Decide on 4 or 6 cycles of CT after reviewing Pet results.  Continue H and or P after PET.  On Venofer 4/10 to replenish Fe stores.      As of today, she has completed 6 of 10 Fe infusions, no increase in energy yet.  PET scan R breast mass resolved,  lymphadenopathy resolved after 4 cycles of chemo.  Stop chemotherapy now.  She is CR now.    She is BRCA 2 positive.  At this point, 1 option to go with right lumpectomy and sentinel node biopsy followed by adjuvant radiation therapy to the right breast and axilla and other LN areas as per Dr. Villanueva.    Another option is right mastectomy and sentinel node biopsy.  Per Dr. Villanueva's note, she would still need radiation therapy after mastectomy.    Another option would be to go with right mastectomy and sentinel node biopsy with prophylactic left mastectomy.  Estimated risk of new primary breast cancer would be 2 or 3% after  bilateral mastectomy here.    She has decided on R partial mastectomy.  Sentinal node bx,  I believe this is a reasonable option for her.  She follows up with Dr. Treviño 5/24/24 to decide on surgery date.  She will need Rad Rx to breast, axilla, other lissette areas as per Dr. Villanueva.    If residual Dx is found, then Xeloda x's 6 months will be discussed.    She had R partial mastectomy, SNBx and Bx of area of microcalcifications.  Path reports 7/30/24 and 8/13/24 No Residual Dx.  She is ERP and PRP negative and H2N positive.    She will need adjuvant H2N Rx for 1 year, H/P, after Rad Rx completed.  She sees Dr. Villanueva 2 pm today.  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  RTC 6 weeks.    ROS:   GEN: normal without any fever, night sweats or weight loss  HEENT: normal with no HA's, sore throat, stiff neck, changes in vision  CV: normal with no CP, SOB, PND, CARNEY or orthopnea  PULM: normal with no SOB, cough, hemoptysis, sputum or pleuritic pain  GI: normal with no abdominal pain, nausea, vomiting, constipation, diarrhea, melanotic stools, BRBPR, or hematemesis  : normal with no hematuria, dysuria  BREAST: See HPI  SKIN: See HPI     Hx BTL, cholecystectomy.    Review of patient's allergies indicates:   Allergen Reactions    Carboplatin Other (See Comments)     Chest tightness and flushing       No meds.    Objective:   Vitals:  Blood pressure (!) 148/82, pulse 84, temperature 97.6 °F (36.4 °C), resp. rate 16, weight 61.2 kg (135 lb).    Physical Examination:   GEN: no apparent distress, comfortable  HEAD: atraumatic and normocephalic  EYES: no pallor, no icterus  ENT: no pharyngeal erythema, external ears WNL; no nasal discharge  NECK: no masses, thyroid normal, trachea midline, no LAD/LN's, supple  CV: RRR with no murmur; normal pulse  CHEST: Normal respiratory effort; CTAB; normal breath sounds; no wheeze or crackles  ABDOM: nontender and nondistended; soft; normal bowel sounds; no  rebound/guarding  MUSC/Skeletal: ROM normal; no crepitus; joints normal; no deformities or arthropathy  EXTREM: no clubbing, cyanosis, inflammation or swelling  SKIN: no rashes, lesions, ulcers, petechiae or subcutaneous nodules  : no cvat  NEURO: grossly intact; motor/sensory WNL; no tremors  PSYCH: normal mood, affect and behavior  LYMPH: normal cervical, supraclavicular, and groin LN's  BREASTS:  Left breast is nontender, without discharge, and without palpable mass, left axilla is without palpable mass or lymphadenopathy.  R breast NT, incisions closed and healing, no palpable mass or lymphadenopathy.    Echocardiogram for ejection fraction will be ordered.  MRI of the brain with contrast for staging will be done.  MRI of the left and right breast with contrast to check for a 2nd primary in the left breast, given her lobular features, will be done.  PET scan is being done for staging, to check for metastatic disease?    Assessment:   (1) 74 y.o. female abnormal mammogram and ultrasound with 4.8 cm mass noted at the anterior axilla and tell of the breast, also a large area of new micro calcifications are seen in the upper outer quadrant of the right breast, and a 1 cm nodule is noted at the 9 o'clock position of the right breast.    (2) biopsy of the 9:00 a.m. nodule and the anterior axillary mass returned showing invasive mixed ductal and lobular carcinoma, grade 2, ERP negative, PRP negative, HER2 Lisa 3+ positive.    (3) my impression at this time is that she is at least stage II.  Staging studies will include MRI with contrast of the brain, MRI with contrast of the left and right breast, PET scan will be done to check for metastatic disease.  CBC, CMP, breast tumor markers have been requested and echocardiogram for ejection fraction has been requested.    (4) because of the negative ERP and PRP studies, tamoxifen or Arimidex would not be expected to help adjuvantly in decreasing her long-term systemic  recurrence risk.    (5) the strongly 3+ positive HER2 Lisa would support that neoadjuvant carboplatin, taxane, Herceptin, Perjeta q. 3 weeks times 4-6 cycle may achieve a CHRIS state at the breast and axillary area after treatment.    (6) she does have the large area microcalcifications at the right breast in addition to the 9:00 a.m. biopsy-proven mass at the right breast.  The initial opinion as discussed with Dr. Villanueva, would be that she would require a right mastectomy and probably a right axillary node dissection, after the completion of neoadjuvant CTHP    (7) as discussed with Dr. Villanueva, she will probably need chest irradiation and right axillary radiation, after her definitive surgery.    (8) will forward a copy of this note to Dr. Treviño, she sees him on Wednesday.  Will ask Dr. Treviño to place a carisa catheter to facilitate systemic neoadjuvant treatment.    For now the PET scan is scheduled March 6 at 3:45 p.m..  Echocardiogram is scheduled at March 8th at 2:30 p.m..  MRI of brain is scheduled at March 8th at 3:00 p.m.  MRI of the breast is scheduled for March 21st at 3:15 p.m.  Iram is going to work on trying to get the MRI of the breast sooner.    (9) knowledge of her family history of cancer is incomplete.  A paternal aunt had breast cancer, a maternal uncle had cancer of unknown type, a sister gives a history of possible cancer.  I have requested BRCA 1 and BRCA 2 testing with an extended profile.    I am going to ask her to follow up with myself during the week of March 11th to review the study results.  I am trying to get her started on the neoadjuvant treatment towards the end of March 11th week after the port has been placed and the local site has healed sufficiently.    I have spent 1 hour interviewing and the patient and examining the patient and discussing my findings and recommendations as outlined above.  It has taken me approximately another hour to summarize the reports, discuss with   Kay, schedule her studies with Iram and to place this note into [facility],  On the visit at the comprehensive clinic last time.    Today 3/15/24  call for Ej Fx, await MRI of breasts, port placement.    CTHP neoadjuvant Rx q 3 weeks x's 4-6 cycles.  D1C1 4/3/24.  At the end of Carboplatin infusion, she c/o chest pain.antonette, YOVANY Whyte saw her, medicated and sx resolved.  Suspected reaction to carboplatin, first infusion.    Cytoxan, Taxotere, Herceptin, Perjeta.  5/15/24.  Adjust dosages.    I think the hernia at the incision site where the gallbladder surgery was done can probably be repaired at a later time electively and does not appear to be emergency at this point.    She did have cataract surgery in January 2024 and is due to have some follow-up procedure done as part of that management.  She does have some blurriness of her vision.  I recommended that she defer the additional cataract procedure if feasible in the near future until we get her off of the chemotherapy and steroids and her other medications as these medicines may be contributing to her visual symptoms.    Hemoglobin is 8.7 and ferritin is 21.  She has iron deficiency anemia.  She did not tolerate oral iron supplementation.  Have discussed with her iron infusion.  I would plan to give her injectafer weekly x2 weeks.  Will premedicate her with Benadryl to reduce the risk of reaction to iron infusion.  I would estimate a 1-2% risk of reaction to iron infusion.  The iron infusion will be given at the [facility] outpatient Department clinic.  Iron administration should facilitate red blood cell production here and improve her hemoglobin up to or close to the normal range and should improve her energy level.     PET CHRIS now.  Stop chemotherapy for now after 4 cycles.  See HPI discussion as above.    R partial mastectomy, SNB 7/30/24, microcalcification bx 8/13/24 CHRIS, no residual disease.    To see Dr. Villanueva today to plan for Rad  Rx locally.  RTC 6 weeks.    Will need adjuvant H2N Rx x's 1 year.  Herceptin w/wo Perjeta  RTC 6 weeks.                                +

## 2024-09-10 ENCOUNTER — TREATMENT (OUTPATIENT)
Dept: RADIATION ONCOLOGY | Facility: CLINIC | Age: 74
End: 2024-09-10
Payer: MEDICARE

## 2024-09-10 PROCEDURE — G6017 INTRAFRACTION TRACK MOTION: HCPCS | Mod: S$GLB,,, | Performed by: RADIOLOGY

## 2024-09-10 PROCEDURE — 77336 RADIATION PHYSICS CONSULT: CPT | Mod: S$GLB,,, | Performed by: RADIOLOGY

## 2024-09-10 PROCEDURE — G6013 RADIATION TREATMENT DELIVERY: HCPCS | Mod: S$GLB,,, | Performed by: RADIOLOGY

## 2024-09-17 ENCOUNTER — TREATMENT (OUTPATIENT)
Dept: RADIATION ONCOLOGY | Facility: CLINIC | Age: 74
End: 2024-09-17
Payer: MEDICARE

## 2024-09-17 PROCEDURE — G6017 INTRAFRACTION TRACK MOTION: HCPCS | Mod: S$GLB,,, | Performed by: RADIOLOGY

## 2024-09-17 PROCEDURE — G6013 RADIATION TREATMENT DELIVERY: HCPCS | Mod: S$GLB,,, | Performed by: RADIOLOGY

## 2024-09-24 ENCOUNTER — TREATMENT (OUTPATIENT)
Dept: RADIATION ONCOLOGY | Facility: CLINIC | Age: 74
End: 2024-09-24
Payer: MEDICARE

## 2024-09-24 PROCEDURE — G6013 RADIATION TREATMENT DELIVERY: HCPCS | Mod: S$GLB,,, | Performed by: RADIOLOGY

## 2024-09-24 PROCEDURE — G6017 INTRAFRACTION TRACK MOTION: HCPCS | Mod: S$GLB,,, | Performed by: RADIOLOGY

## 2024-10-01 ENCOUNTER — TREATMENT (OUTPATIENT)
Dept: RADIATION ONCOLOGY | Facility: CLINIC | Age: 74
End: 2024-10-01
Payer: MEDICARE

## 2024-10-01 PROCEDURE — G6017 INTRAFRACTION TRACK MOTION: HCPCS | Mod: S$GLB,,, | Performed by: RADIOLOGY

## 2024-10-01 PROCEDURE — G6013 RADIATION TREATMENT DELIVERY: HCPCS | Mod: S$GLB,,, | Performed by: RADIOLOGY

## 2024-10-08 ENCOUNTER — TREATMENT (OUTPATIENT)
Dept: RADIATION ONCOLOGY | Facility: CLINIC | Age: 74
End: 2024-10-08
Payer: MEDICARE

## 2024-10-08 PROCEDURE — 77334 RADIATION TREATMENT AID(S): CPT | Mod: S$GLB,,, | Performed by: RADIOLOGY

## 2024-10-08 PROCEDURE — G6017 INTRAFRACTION TRACK MOTION: HCPCS | Mod: S$GLB,,, | Performed by: RADIOLOGY

## 2024-10-08 PROCEDURE — 77290 THER RAD SIMULAJ FIELD CPLX: CPT | Mod: S$GLB,,, | Performed by: RADIOLOGY

## 2024-10-08 PROCEDURE — 77307 TELETHX ISODOSE PLAN CPLX: CPT | Mod: S$GLB,,, | Performed by: RADIOLOGY

## 2024-10-08 PROCEDURE — G6013 RADIATION TREATMENT DELIVERY: HCPCS | Mod: S$GLB,,, | Performed by: RADIOLOGY

## 2024-10-15 ENCOUNTER — TREATMENT (OUTPATIENT)
Dept: RADIATION ONCOLOGY | Facility: CLINIC | Age: 74
End: 2024-10-15
Payer: MEDICARE

## 2024-10-15 PROCEDURE — G6012 RADIATION TREATMENT DELIVERY: HCPCS | Mod: S$GLB,,, | Performed by: RADIOLOGY

## 2024-10-15 PROCEDURE — 77280 THER RAD SIMULAJ FIELD SMPL: CPT | Mod: S$GLB,,, | Performed by: RADIOLOGY

## 2024-10-15 PROCEDURE — G6017 INTRAFRACTION TRACK MOTION: HCPCS | Mod: S$GLB,,, | Performed by: RADIOLOGY

## 2024-10-18 ENCOUNTER — TREATMENT (OUTPATIENT)
Dept: RADIATION ONCOLOGY | Facility: CLINIC | Age: 74
End: 2024-10-18
Payer: MEDICARE

## 2024-10-18 PROCEDURE — G6012 RADIATION TREATMENT DELIVERY: HCPCS | Mod: S$GLB,,, | Performed by: RADIOLOGY

## 2024-10-18 PROCEDURE — G6017 INTRAFRACTION TRACK MOTION: HCPCS | Mod: S$GLB,,, | Performed by: RADIOLOGY

## 2024-10-21 ENCOUNTER — OFFICE VISIT (OUTPATIENT)
Facility: CLINIC | Age: 74
End: 2024-10-21
Payer: MEDICARE

## 2024-10-21 ENCOUNTER — TREATMENT (OUTPATIENT)
Dept: RADIATION ONCOLOGY | Facility: CLINIC | Age: 74
End: 2024-10-21
Payer: MEDICARE

## 2024-10-21 VITALS
SYSTOLIC BLOOD PRESSURE: 134 MMHG | HEART RATE: 73 BPM | HEIGHT: 65 IN | BODY MASS INDEX: 22.82 KG/M2 | TEMPERATURE: 97 F | WEIGHT: 137 LBS | DIASTOLIC BLOOD PRESSURE: 70 MMHG | RESPIRATION RATE: 17 BRPM

## 2024-10-21 DIAGNOSIS — C50.911 INVASIVE DUCTAL CARCINOMA OF BREAST, FEMALE, RIGHT: Primary | ICD-10-CM

## 2024-10-21 DIAGNOSIS — D50.0 IRON DEFICIENCY ANEMIA DUE TO CHRONIC BLOOD LOSS: ICD-10-CM

## 2024-10-21 PROCEDURE — 1159F MED LIST DOCD IN RCRD: CPT | Mod: CPTII,S$GLB,, | Performed by: INTERNAL MEDICINE

## 2024-10-21 PROCEDURE — 3288F FALL RISK ASSESSMENT DOCD: CPT | Mod: CPTII,S$GLB,, | Performed by: INTERNAL MEDICINE

## 2024-10-21 PROCEDURE — 1126F AMNT PAIN NOTED NONE PRSNT: CPT | Mod: CPTII,S$GLB,, | Performed by: INTERNAL MEDICINE

## 2024-10-21 PROCEDURE — 3008F BODY MASS INDEX DOCD: CPT | Mod: CPTII,S$GLB,, | Performed by: INTERNAL MEDICINE

## 2024-10-21 PROCEDURE — G6017 INTRAFRACTION TRACK MOTION: HCPCS | Mod: S$GLB,,, | Performed by: RADIOLOGY

## 2024-10-21 PROCEDURE — 1101F PT FALLS ASSESS-DOCD LE1/YR: CPT | Mod: CPTII,S$GLB,, | Performed by: INTERNAL MEDICINE

## 2024-10-21 PROCEDURE — 3075F SYST BP GE 130 - 139MM HG: CPT | Mod: CPTII,S$GLB,, | Performed by: INTERNAL MEDICINE

## 2024-10-21 PROCEDURE — G2211 COMPLEX E/M VISIT ADD ON: HCPCS | Mod: S$GLB,,, | Performed by: INTERNAL MEDICINE

## 2024-10-21 PROCEDURE — 99999 PR PBB SHADOW E&M-EST. PATIENT-LVL III: CPT | Mod: PBBFAC,,, | Performed by: INTERNAL MEDICINE

## 2024-10-21 PROCEDURE — 3078F DIAST BP <80 MM HG: CPT | Mod: CPTII,S$GLB,, | Performed by: INTERNAL MEDICINE

## 2024-10-21 PROCEDURE — G6012 RADIATION TREATMENT DELIVERY: HCPCS | Mod: S$GLB,,, | Performed by: RADIOLOGY

## 2024-10-21 PROCEDURE — 99215 OFFICE O/P EST HI 40 MIN: CPT | Mod: S$GLB,,, | Performed by: INTERNAL MEDICINE

## 2024-10-23 ENCOUNTER — TELEPHONE (OUTPATIENT)
Facility: CLINIC | Age: 74
End: 2024-10-23
Payer: MEDICARE

## 2024-10-23 NOTE — TELEPHONE ENCOUNTER
----- Message from Nella sent at 10/23/2024  9:19 AM CDT -----  Pt would like a call back with the name of the chemo that she will start, will her hair fall out again?     [phone]

## 2024-10-23 NOTE — TELEPHONE ENCOUNTER
Spoke with Dr Brooks, patient will be on herceptin and her hair should not fall out. Patient made aware of above and verbalized understanding.

## 2024-10-25 ENCOUNTER — TELEPHONE (OUTPATIENT)
Facility: CLINIC | Age: 74
End: 2024-10-25
Payer: MEDICARE

## 2024-10-25 DIAGNOSIS — C50.911 INVASIVE DUCTAL CARCINOMA OF BREAST, FEMALE, RIGHT: Primary | ICD-10-CM

## 2024-10-25 RX ORDER — EPINEPHRINE 0.3 MG/.3ML
0.3 INJECTION SUBCUTANEOUS ONCE AS NEEDED
OUTPATIENT
Start: 2024-11-15

## 2024-10-25 RX ORDER — SODIUM CHLORIDE 0.9 % (FLUSH) 0.9 %
10 SYRINGE (ML) INJECTION
OUTPATIENT
Start: 2024-11-15

## 2024-10-25 RX ORDER — HEPARIN 100 UNIT/ML
500 SYRINGE INTRAVENOUS
OUTPATIENT
Start: 2024-11-15

## 2024-10-25 RX ORDER — DIPHENHYDRAMINE HYDROCHLORIDE 50 MG/ML
50 INJECTION INTRAMUSCULAR; INTRAVENOUS ONCE AS NEEDED
OUTPATIENT
Start: 2024-11-15

## 2024-10-25 NOTE — TELEPHONE ENCOUNTER
Ask her, was she wanting to begin herceptin q 3 weeks x's 1 year on 11/15/24 or 12/6/24?    She will need cbc cmp breast cancer tumor markers and echo before we start herceptin.    Does she want this at [facility] or ?   waiting for official MBS reading. Decreased mastication and decrease bolus movement. Thyroglossal hypertrophy.      No other GI work up needed. If pt is tolerating diet, no need for EGD or PEG.may D/C to home/rehab waiting for official MBS reading. Decreased mastication and decrease bolus movement. Thyroglossal/cricopharygeal  hypertrophy.  consider ENT consult to evaulate this .     GERD- give BiD PPI.  Though dysphagia appears to be proximal, pt has symptoms of GERD as well. Will do UGI series

## 2024-10-25 NOTE — TELEPHONE ENCOUNTER
I placed orders in epic for hercepting q 3 weeks x's 1 year.    Start 11/15/24 or 12/6/24, ask pt which she prefers.

## 2024-11-08 ENCOUNTER — TELEPHONE (OUTPATIENT)
Facility: CLINIC | Age: 74
End: 2024-11-08
Payer: MEDICARE

## 2024-11-08 NOTE — TELEPHONE ENCOUNTER
I spoke with pt and reminded her to have labs done prior to appt on 11/15/24 pt verbalized understanding.

## 2024-11-12 ENCOUNTER — CLINICAL SUPPORT (OUTPATIENT)
Dept: RADIATION ONCOLOGY | Facility: CLINIC | Age: 74
End: 2024-11-12
Payer: MEDICARE

## 2024-11-12 DIAGNOSIS — C50.911 INVASIVE DUCTAL CARCINOMA OF BREAST, FEMALE, RIGHT: Primary | ICD-10-CM

## 2024-11-12 PROCEDURE — 99499 UNLISTED E&M SERVICE: CPT | Mod: 95,,, | Performed by: RADIOLOGY

## 2024-11-12 NOTE — PROGRESS NOTES
DIAGNOSIS: IIIB, [id number] g2 IDC/ILC of UOQ R breast, ER(-)/AZ(-)/HER2(3+); [id number]    TREATMENT  Completed adjuvant radiotherapy to the right breast and draining lymphatics, 5040 cGy followed by 1000 cGy boost to lumpectomy cavity ending October 21, 2024.  Treatment well tolerated with mild fatigue and expected radiation dermatitis.    Contacted patient today for 3 week checkup.  Denies fever, chills, chest pain, shortness of breath, cough, pain or difficulty with swallowing.  Patient reports skin is healing.  She is not using a moisturizer.  Mild residual fatigue.    A/P  1.  Good tolerance and recovery from adjuvant radiotherapy.  Recommend transition to moisturizer containing vitamin E with nightly massages of the right breast and range of motion exercises for right upper extremity.  2.  Follow-up with Dr. Brooks, Dr. Treviño; herceptin planned  3.  Return to clinic 6mos.

## 2024-11-15 ENCOUNTER — INFUSION (OUTPATIENT)
Dept: INFUSION THERAPY | Facility: HOSPITAL | Age: 74
End: 2024-11-15
Attending: INTERNAL MEDICINE
Payer: MEDICARE

## 2024-11-15 ENCOUNTER — OFFICE VISIT (OUTPATIENT)
Facility: CLINIC | Age: 74
End: 2024-11-15
Payer: MEDICARE

## 2024-11-15 VITALS
DIASTOLIC BLOOD PRESSURE: 94 MMHG | HEIGHT: 65 IN | TEMPERATURE: 98 F | WEIGHT: 138 LBS | BODY MASS INDEX: 22.99 KG/M2 | HEART RATE: 72 BPM | RESPIRATION RATE: 16 BRPM | SYSTOLIC BLOOD PRESSURE: 133 MMHG

## 2024-11-15 VITALS
HEIGHT: 65 IN | DIASTOLIC BLOOD PRESSURE: 79 MMHG | HEART RATE: 75 BPM | TEMPERATURE: 97 F | SYSTOLIC BLOOD PRESSURE: 129 MMHG | WEIGHT: 138 LBS | RESPIRATION RATE: 16 BRPM | BODY MASS INDEX: 22.99 KG/M2

## 2024-11-15 DIAGNOSIS — T45.1X5A CHEMOTHERAPY INDUCED NEUTROPENIA: Primary | ICD-10-CM

## 2024-11-15 DIAGNOSIS — K52.1 CHEMOTHERAPY INDUCED DIARRHEA: ICD-10-CM

## 2024-11-15 DIAGNOSIS — T45.1X5A CHEMOTHERAPY INDUCED DIARRHEA: ICD-10-CM

## 2024-11-15 DIAGNOSIS — C50.911 INVASIVE DUCTAL CARCINOMA OF BREAST, FEMALE, RIGHT: ICD-10-CM

## 2024-11-15 DIAGNOSIS — D70.1 CHEMOTHERAPY INDUCED NEUTROPENIA: Primary | ICD-10-CM

## 2024-11-15 PROCEDURE — 25000003 PHARM REV CODE 250: Performed by: INTERNAL MEDICINE

## 2024-11-15 PROCEDURE — A4216 STERILE WATER/SALINE, 10 ML: HCPCS | Performed by: INTERNAL MEDICINE

## 2024-11-15 PROCEDURE — 63600175 PHARM REV CODE 636 W HCPCS: Performed by: INTERNAL MEDICINE

## 2024-11-15 PROCEDURE — 99999 PR PBB SHADOW E&M-EST. PATIENT-LVL III: CPT | Mod: PBBFAC,,, | Performed by: INTERNAL MEDICINE

## 2024-11-15 PROCEDURE — 96415 CHEMO IV INFUSION ADDL HR: CPT

## 2024-11-15 PROCEDURE — 96413 CHEMO IV INFUSION 1 HR: CPT

## 2024-11-15 RX ORDER — DIPHENOXYLATE HYDROCHLORIDE AND ATROPINE SULFATE 2.5; .025 MG/1; MG/1
1 TABLET ORAL 4 TIMES DAILY PRN
Qty: 30 TABLET | Refills: 1 | Status: SHIPPED | OUTPATIENT
Start: 2024-11-15 | End: 2025-11-15

## 2024-11-15 RX ORDER — DIPHENHYDRAMINE HYDROCHLORIDE 50 MG/ML
50 INJECTION INTRAMUSCULAR; INTRAVENOUS ONCE AS NEEDED
Status: DISCONTINUED | OUTPATIENT
Start: 2024-11-15 | End: 2024-11-15 | Stop reason: HOSPADM

## 2024-11-15 RX ORDER — EPINEPHRINE 0.3 MG/.3ML
0.3 INJECTION SUBCUTANEOUS ONCE AS NEEDED
Status: DISCONTINUED | OUTPATIENT
Start: 2024-11-15 | End: 2024-11-15 | Stop reason: HOSPADM

## 2024-11-15 RX ORDER — SODIUM CHLORIDE 0.9 % (FLUSH) 0.9 %
10 SYRINGE (ML) INJECTION
Status: DISCONTINUED | OUTPATIENT
Start: 2024-11-15 | End: 2024-11-15 | Stop reason: HOSPADM

## 2024-11-15 RX ORDER — HEPARIN 100 UNIT/ML
500 SYRINGE INTRAVENOUS
Status: DISCONTINUED | OUTPATIENT
Start: 2024-11-15 | End: 2024-11-15 | Stop reason: HOSPADM

## 2024-11-15 RX ADMIN — SODIUM CHLORIDE, PRESERVATIVE FREE 10 ML: 5 INJECTION INTRAVENOUS at 12:11

## 2024-11-15 RX ADMIN — SODIUM CHLORIDE: 9 INJECTION, SOLUTION INTRAVENOUS at 10:11

## 2024-11-15 RX ADMIN — TRASTUZUMAB-ANNS 497 MG: 420 INJECTION, POWDER, LYOPHILIZED, FOR SOLUTION INTRAVENOUS at 10:11

## 2024-11-15 RX ADMIN — HEPARIN 500 UNITS: 100 SYRINGE at 12:11

## 2024-11-15 NOTE — PLAN OF CARE
Problem: Fall Injury Risk  Goal: Absence of Fall and Fall-Related Injury  Outcome: Progressing  Intervention: Identify and Manage Contributors  Flowsheets (Taken 11/15/2024 1020)  Self-Care Promotion: independence encouraged  Medication Review/Management: medications reviewed  Intervention: Promote Injury-Free Environment  Flowsheets (Taken 11/15/2024 1020)  Safety Promotion/Fall Prevention: medications reviewed

## 2024-11-15 NOTE — LETTER
November 15, 2024        Dmearco Meza MD  [address] [facility]  [address]  [place] [address]             [facility]  [address]  [facility] [address]  Phone: [phone]  Fax: [phone]   Patient: Dora Atwood   MR Number: [id number]   YOB: 1950   Date of Visit: 11/15/2024       Dear Dr. Meza:    Thank you for referring Dora Atwood to me for evaluation. Below are the relevant portions of my assessment and plan of care.            If you have questions, please do not hesitate to call me. I look forward to following Dora along with you.    Sincerely,      SIMONE Brooks MD           CC  No Recipients

## 2024-11-16 NOTE — PROGRESS NOTES
[facility] [address] Hematology Oncology Subsequent  encounter note    11/15/24    Subjective:      Patient ID:   Dora Atwood  74 y.o. female  1950  Waleska Plasencia Ed Mannina, MD's      Chief Complaint:   R breast Cancer    HPI:   74 y.o. female palpated a hard lump at the area of the tail of the right breast and the anterior right axilla.  The mass was hard, large, but movable and not fixed.  Dr. Meza, her PMD ordered a bilateral diagnostic mammogram and ultrasound.      She has inversion of the left and right nipple, she says this is a chronic finding and her breasts have always been with the inversion.  Pleomorphic microcalcifications extending from the upper central to the upper outer quadrant of the right breast are noted and cover an area of approximately 4 x 8 cm, these are new from prior studies.  The left breast shows no masses or microcalcifications.  There is a large mass at the right axilla measuring 4.5 cm in diameter.      Ultrasound of right breast and axilla show a 1 cm mass 3 cm from the nipple at the 9 o'clock position.  In the skin of the lateral and inferior right breast there are numerous small hypoechoic nodules on the order of 3-6 mm in size.  Ultrasound of the axilla shows a large mass with irregular borders at 3 x 4.8 cm.  Just lateral to this mass is a smaller mass at 3 x 2 cm.  The radiologist recommends biopsy of the nodule at 9:00 a.m. and the large right axillary mass presumed to be a metastatic lymph node.  He notes if the biopsy of the right breast nodule fails to demonstrate concordant histologic diagnosis, then stereotactic biopsy of the microcalcifications can be performed.    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  Focal ductal carcinoma insight 2, intermediate grade, solid pattern was seen.  ERP was negative, PRP was negative, HER2 Lisa was 3+  positive    Biopsy the right axilla lymph node returned positive for metastatic carcinoma.  Residual lymphoid architecture is not seen.    She had onset of menses at age 14.  She is a  2 para 2 miscarriage 0 individual.  She delivered her 1st child at the maternal age of 25 and the 2nd child at the maternal age of 30.  She took birth control pills for approximately 10 years.  She had bilateral tubal ligation at age 30.  She went through the menopause in her mid 40s and she did take hormone replacement therapy for approximately 10 years.      Mom had rectal cancer, dad had diabetes and heart disease.  A paternal aunt had breast cancer, maternal uncle  in his 20s of some type of cancer.  A brother has heart disease.  Another brother  of an overdose, and a 3rd brother  of unknown cause.  She has a sister that she believes may have type of cancer and another sister with diabetes.  Her son and daughter are alive and well.  There is no family history known to her of ovarian, prostate, pancreas, or melanoma cancers.      CAT of head negative for metastatic dx.  ECHO Ej Fx 65-68%  MRI of breast done 3/21/24.  I spoke with Radiology, they are awaiting mamm from , should have a report by 14 days, 24.  PET uptake in mass, R supraclavicular LN, infraclavicular LN, internal mammary LN, R axillary LN, and mild increase in FGD  with skin thickening and edema.    This week, tast is improved, BM NL.  Next Rx 5/15/24, adjusted dosages.  PN at L hand resolved.  Energy 8/10  Cataract surgery 2024.     May 15th she was much improved and in better spirits.  Taste has returned to normal at this time and appetite has recovered, she actually gets hungry.  Her BMI is formed.  Today is day 1 of her 3rd cycle of chemotherapy.  And she does expect that her symptoms will recur later this weekend.  She does have her scopolamine patch in place, without nausea today, she did not have to take Zofran or Phenergan today.   She is going to take the Lomotil on Saturday x3 or 4 days preventively and then will take the Lomotil again if diarrhea symptoms recur.    Now on exam the anterior axillary lymph node is not palpable and the breast mass laterally is not palpable after 2 cycles of chemotherapy.  She did she is developing a small hernia at the site of her previous gallbladder surgery.  The hernia is nontender and easily reducible.    Her hemoglobin is 8.7 and her ferritin returned low at 21.  She could not tolerate oral iron because of GI symptoms.  She does agree to injected for or iron infusion weekly at the [facility] outpatient [facility].  There is a 1 or 2% risk of reaction to iron infusion.  Will give her Benadryl as a premedication and will observe her for 1 hour after each infusion.  Iron replenishment here will facilitate red blood cell production and improve her hemoglobin and should improve her energy.    S/P CTHP x's 4, marked fatigue, altered taste, 4 # weight loss.  For PET 6/28/24.  Defer course % to 7/3 or 7/5 after the PET.  Decide on 4 or 6 cycles of CT after reviewing Pet results.  Continue H and or P after PET.  On Venofer 4/10 to replenish Fe stores.      As of today, she has completed 6 of 10 Fe infusions, no increase in energy yet.  PET scan R breast mass resolved,  lymphadenopathy resolved after 4 cycles of chemo.  Stop chemotherapy now.  She is CR now.    She is BRCA 2 positive.  At this point, 1 option to go with right lumpectomy and sentinel node biopsy followed by adjuvant radiation therapy to the right breast and axilla and other LN areas as per Dr. Villanueva.    Another option is right mastectomy and sentinel node biopsy.  Per Dr. Villanueva's note, she would still need radiation therapy after mastectomy.    Another option would be to go with right mastectomy and sentinel node biopsy with prophylactic left mastectomy.  Estimated risk of new primary breast cancer would be 2 or 3% after  "bilateral mastectomy here.    She has decided on R partial mastectomy.  Sentinal node bx,  I believe this is a reasonable option for her.  She follows up with Dr. Treviño 5/24/24 to decide on surgery date.  She will need Rad Rx to breast, axilla, other lissette areas as per Dr. Villanueva.    If residual Dx is found, then Xeloda x's 6 months will be discussed.  No residual Dx seen.    She had R partial mastectomy, SNBx and Bx of area of microcalcifications.  Path reports 7/30/24 and 8/13/24 No Residual Dx.  She is ERP and PRP negative and H2N positive.    She will need adjuvant H2N Rx for 1 year, H, after Rad Rx completed.   She return to work as mentor teacher in 10/2024.  She completed Rad Rx.   Skin at breast is pealing.    Due to begin herceptin q 3 weeks, 11/15/24, D1C1.  See Isabell 2 weeks, Check lab q 3 weeks.  D1C2 in 3 weeks 12/6/24.  Discussed following ECHO to check for cardiac toxicity 2nd Herceptin.  Current ECHO Ej Fx 65%.  Watch for diarrhea sx 2nd Herceptin, refill Lomotil qid prn.      ROS:   GEN: normal without any fever, night sweats or weight loss  HEENT: normal with no HA's, sore throat, stiff neck, changes in vision  CV: normal with no CP, SOB, PND, CARNEY or orthopnea  PULM: normal with no SOB, cough, hemoptysis, sputum or pleuritic pain  GI: normal with no abdominal pain, nausea, vomiting, constipation, diarrhea, melanotic stools, BRBPR, or hematemesis  : normal with no hematuria, dysuria  BREAST: See HPI  SKIN: See HPI     Hx BTL, cholecystectomy.    Review of patient's allergies indicates:   Allergen Reactions    Carboplatin Other (See Comments)     Chest tightness and flushing       No meds.    Objective:   Vitals:  Blood pressure (!) 133/94, pulse 72, temperature 98.1 °F (36.7 °C), temperature source Temporal, resp. rate 16, height 5' 5" (1.651 m), weight 62.6 kg (138 lb).    Physical Examination:   GEN: no apparent distress, comfortable  HEAD: atraumatic and normocephalic  EYES: no pallor, no " icterus  ENT: no pharyngeal erythema, external ears WNL; no nasal discharge  NECK: no masses, thyroid normal, trachea midline, no LAD/LN's, supple  CV: RRR with no murmur; normal pulse  CHEST: Normal respiratory effort; CTAB; normal breath sounds; no wheeze or crackles  ABDOM: nontender and nondistended; soft; normal bowel sounds; no rebound/guarding  MUSC/Skeletal: ROM normal; no crepitus; joints normal; no deformities or arthropathy  EXTREM: no clubbing, cyanosis, inflammation or swelling  SKIN: no rashes, lesions, ulcers, petechiae or subcutaneous nodules  : no cvat  NEURO: grossly intact; motor/sensory WNL; no tremors  PSYCH: normal mood, affect and behavior  LYMPH: normal cervical, supraclavicular, and groin LN's  BREASTS:  Left breast is nontender, without discharge, and without palpable mass, left axilla is without palpable mass or lymphadenopathy.  R breast NT, incisions closed and healing, no palpable mass or lymphadenopathy.    Echocardiogram for ejection fraction will be ordered.  MRI of the brain with contrast for staging will be done.  MRI of the left and right breast with contrast to check for a 2nd primary in the left breast, given her lobular features, will be done.  PET scan is being done for staging, to check for metastatic disease?    Assessment:   (1) 74 y.o. female abnormal mammogram and ultrasound with 4.8 cm mass noted at the anterior axilla and tell of the breast, also a large area of new micro calcifications are seen in the upper outer quadrant of the right breast, and a 1 cm nodule is noted at the 9 o'clock position of the right breast.    (2) biopsy of the 9:00 a.m. nodule and the anterior axillary mass returned showing invasive mixed ductal and lobular carcinoma, grade 2, ERP negative, PRP negative, HER2 Lisa 3+ positive.    (3) my impression at this time is that she is at least stage II.  Staging studies will include MRI with contrast of the brain, MRI with contrast of the left and  right breast, PET scan will be done to check for metastatic disease.  CBC, CMP, breast tumor markers have been requested and echocardiogram for ejection fraction has been requested.    (4) because of the negative ERP and PRP studies, tamoxifen or Arimidex would not be expected to help adjuvantly in decreasing her long-term systemic recurrence risk.    (5) the strongly 3+ positive HER2 Lisa would support that neoadjuvant carboplatin, taxane, Herceptin, Perjeta q. 3 weeks times 4-6 cycle may achieve a CHRIS state at the breast and axillary area after treatment.    (6) she does have the large area microcalcifications at the right breast in addition to the 9:00 a.m. biopsy-proven mass at the right breast.  The initial opinion as discussed with Dr. Villanueva, would be that she would require a right mastectomy and probably a right axillary node dissection, after the completion of neoadjuvant CTHP    (7) as discussed with Dr. Villanueva, she will probably need chest irradiation and right axillary radiation, after her definitive surgery.    (8) will forward a copy of this note to Dr. Treviño, she sees him on Wednesday.  Will ask Dr. Treviño to place a carisa catheter to facilitate systemic neoadjuvant treatment.    For now the PET scan is scheduled March 6 at 3:45 p.m..  Echocardiogram is scheduled at March 8th at 2:30 p.m..  MRI of brain is scheduled at March 8th at 3:00 p.m.  MRI of the breast is scheduled for March 21st at 3:15 p.m.  Iram is going to work on trying to get the MRI of the breast sooner.    (9) knowledge of her family history of cancer is incomplete.  A paternal aunt had breast cancer, a maternal uncle had cancer of unknown type, a sister gives a history of possible cancer.  I have requested BRCA 1 and BRCA 2 testing with an extended profile.    I am going to ask her to follow up with myself during the week of March 11th to review the study results.  I am trying to get her started on the neoadjuvant treatment towards  the end of March 11th week after the port has been placed and the local site has healed sufficiently.    I have spent 1 hour interviewing and the patient and examining the patient and discussing my findings and recommendations as outlined above.  It has taken me approximately another hour to summarize the reports, discuss with Dr. Villanueva, schedule her studies with Iram and to place this note into [facility],  On the visit at the comprehensive clinic last time.    Today 3/15/24  call for Ej Fx, await MRI of breasts, port placement.    CTHP neoadjuvant Rx q 3 weeks x's 4-6 cycles.  D1C1 4/3/24.  At the end of Carboplatin infusion, she c/o chest pain.Isabell whitman NP saw her, medicated and sx resolved.  Suspected reaction to carboplatin, first infusion.    Cytoxan, Taxotere, Herceptin, Perjeta.  5/15/24.  Adjust dosages.    I think the hernia at the incision site where the gallbladder surgery was done can probably be repaired at a later time electively and does not appear to be emergency at this point.    She did have cataract surgery in January 2024 and is due to have some follow-up procedure done as part of that management.  She does have some blurriness of her vision.  I recommended that she defer the additional cataract procedure if feasible in the near future until we get her off of the chemotherapy and steroids and her other medications as these medicines may be contributing to her visual symptoms.    Hemoglobin is 8.7 and ferritin is 21.  She has iron deficiency anemia.  She did not tolerate oral iron supplementation.  Have discussed with her iron infusion.  I would plan to give her injectafer weekly x2 weeks.  Will premedicate her with Benadryl to reduce the risk of reaction to iron infusion.  I would estimate a 1-2% risk of reaction to iron infusion.  The iron infusion will be given at the [facility] outpatient Department clinic.  Iron administration should facilitate red blood cell production  "here and improve her hemoglobin up to or close to the normal range and should improve her energy level.      R partial mastectomy, SNB 7/30/24, microcalcification bx 8/13/24 CHRIS, no residual disease.  138#  5'5"    S/P Rad Rx.  Adjuvant anti H2N Rx x's 1 year.  Herceptin.  Monitor Ej Fx.  RTC 6 weeks.See me.  RTC 2,4 weeks YOVANY Whyte.                                +                    "

## 2024-12-03 ENCOUNTER — TELEPHONE (OUTPATIENT)
Facility: CLINIC | Age: 74
End: 2024-12-03
Payer: MEDICARE

## 2024-12-03 NOTE — NURSING
Left message with both patient and  to notify of the need to reschedule follow up appointment with Isabell to Monday 12/9/24. Sent message to  to notify. Call back number given.

## 2024-12-04 DIAGNOSIS — C50.911 MALIGNANT NEOPLASM OF RIGHT FEMALE BREAST, UNSPECIFIED ESTROGEN RECEPTOR STATUS, UNSPECIFIED SITE OF BREAST: ICD-10-CM

## 2024-12-04 DIAGNOSIS — Z85.3 HISTORY OF RIGHT BREAST CANCER: Primary | ICD-10-CM

## 2024-12-04 RX ORDER — HEPARIN 100 UNIT/ML
500 SYRINGE INTRAVENOUS
Status: CANCELLED | OUTPATIENT
Start: 2024-12-06

## 2024-12-04 RX ORDER — SODIUM CHLORIDE 0.9 % (FLUSH) 0.9 %
10 SYRINGE (ML) INJECTION
Status: CANCELLED | OUTPATIENT
Start: 2024-12-06

## 2024-12-04 RX ORDER — EPINEPHRINE 0.3 MG/.3ML
0.3 INJECTION SUBCUTANEOUS ONCE AS NEEDED
Status: CANCELLED | OUTPATIENT
Start: 2024-12-06

## 2024-12-04 RX ORDER — DIPHENHYDRAMINE HYDROCHLORIDE 50 MG/ML
50 INJECTION INTRAMUSCULAR; INTRAVENOUS ONCE AS NEEDED
Status: CANCELLED | OUTPATIENT
Start: 2024-12-06

## 2024-12-05 ENCOUNTER — OFFICE VISIT (OUTPATIENT)
Facility: CLINIC | Age: 74
End: 2024-12-05
Payer: MEDICARE

## 2024-12-05 VITALS
WEIGHT: 139 LBS | SYSTOLIC BLOOD PRESSURE: 137 MMHG | TEMPERATURE: 98 F | HEART RATE: 89 BPM | RESPIRATION RATE: 16 BRPM | DIASTOLIC BLOOD PRESSURE: 81 MMHG | HEIGHT: 64 IN | BODY MASS INDEX: 23.73 KG/M2

## 2024-12-05 DIAGNOSIS — T45.1X5A CHEMOTHERAPY-INDUCED NAUSEA: ICD-10-CM

## 2024-12-05 DIAGNOSIS — D50.8 IRON DEFICIENCY ANEMIA SECONDARY TO INADEQUATE DIETARY IRON INTAKE: ICD-10-CM

## 2024-12-05 DIAGNOSIS — C50.911 INVASIVE DUCTAL CARCINOMA OF BREAST, FEMALE, RIGHT: Primary | ICD-10-CM

## 2024-12-05 DIAGNOSIS — R11.0 CHEMOTHERAPY-INDUCED NAUSEA: ICD-10-CM

## 2024-12-05 PROCEDURE — 3075F SYST BP GE 130 - 139MM HG: CPT | Mod: CPTII,S$GLB,, | Performed by: NURSE PRACTITIONER

## 2024-12-05 PROCEDURE — 1159F MED LIST DOCD IN RCRD: CPT | Mod: CPTII,S$GLB,, | Performed by: NURSE PRACTITIONER

## 2024-12-05 PROCEDURE — 1126F AMNT PAIN NOTED NONE PRSNT: CPT | Mod: CPTII,S$GLB,, | Performed by: NURSE PRACTITIONER

## 2024-12-05 PROCEDURE — 3288F FALL RISK ASSESSMENT DOCD: CPT | Mod: CPTII,S$GLB,, | Performed by: NURSE PRACTITIONER

## 2024-12-05 PROCEDURE — 99215 OFFICE O/P EST HI 40 MIN: CPT | Mod: S$GLB,,, | Performed by: NURSE PRACTITIONER

## 2024-12-05 PROCEDURE — 3008F BODY MASS INDEX DOCD: CPT | Mod: CPTII,S$GLB,, | Performed by: NURSE PRACTITIONER

## 2024-12-05 PROCEDURE — G2211 COMPLEX E/M VISIT ADD ON: HCPCS | Mod: S$GLB,,, | Performed by: NURSE PRACTITIONER

## 2024-12-05 PROCEDURE — 99999 PR PBB SHADOW E&M-EST. PATIENT-LVL III: CPT | Mod: PBBFAC,,, | Performed by: NURSE PRACTITIONER

## 2024-12-05 PROCEDURE — 1101F PT FALLS ASSESS-DOCD LE1/YR: CPT | Mod: CPTII,S$GLB,, | Performed by: NURSE PRACTITIONER

## 2024-12-05 PROCEDURE — 3079F DIAST BP 80-89 MM HG: CPT | Mod: CPTII,S$GLB,, | Performed by: NURSE PRACTITIONER

## 2024-12-05 NOTE — PROGRESS NOTES
[facility] HEMATOLGY ONCOLOGY CONSULTATION    Subjective:       Patient ID: Dora Atwood is a 74 y.o. female returning today for a regularly scheduled follow-up visit.    Chief Complaint: Invasive ductal carcinoma of breast, female, right (3 week follow up )      HPI  The patient is here today by herself.     She had previously had a lumpectomy, XRT, and TCHP x 4 cycles. She is on herceptin post XRT.     She had her first cycle of herceptin for one year.   She is getting a mammogram in February   She is now establishing with  due to Dr. Treviño retiring.       Past Medical History:   Diagnosis Date    Breast cancer 01/01/2024    Incisional hernia     from gallbladder surgery       Past Surgical History:   Procedure Laterality Date    CATARACT EXTRACTION Bilateral 01/2024    CHOLECYSTECTOMY  2022    INJECTION FOR SENTINEL NODE IDENTIFICATION Right 7/30/2024    Procedure: INJECTION, FOR SENTINEL NODE IDENTIFICATION;  Surgeon: Waleska Treviño MD;  Location: [facility];  Service: General;  Laterality: Right;    INSERTION OF TUNNELED CENTRAL VENOUS CATHETER (CVC) WITH SUBCUTANEOUS PORT Right 3/19/2024    Procedure: INSERTION, PORT-A-CATH;  Surgeon: Waleska Treviño MD;  Location: [facility] OR;  Service: General;  Laterality: Right;    MASTECTOMY, PARTIAL Right 7/30/2024    Procedure: MASTECTOMY, PARTIAL;  Surgeon: Waleska Treviño MD;  Location: [facility];  Service: General;  Laterality: Right;  SYDNIE  (7/25)    TUBAL LIGATION  1981       Social History     Socioeconomic History    Marital status:    Tobacco Use    Smoking status: Never    Smokeless tobacco: Never   Substance and Sexual Activity    Alcohol use: Never    Drug use: Never     Social Drivers of Health     Financial Resource Strain: Low Risk  (3/21/2024)    Overall Financial Resource Strain (CARDIA)     Difficulty of Paying Living Expenses: Not hard at all   Food Insecurity: No Food Insecurity (3/21/2024)    Hunger Vital Sign     Worried About Running Out  of Food in the Last Year: Never true     Ran Out of Food in the Last Year: Never true   Transportation Needs: No Transportation Needs (3/21/2024)    PRAPARE - Transportation     Lack of Transportation (Medical): No     Lack of Transportation (Non-Medical): No   Physical Activity: Sufficiently Active (3/21/2024)    Exercise Vital Sign     Days of Exercise per Week: 6 days     Minutes of Exercise per Session: 30 min   Stress: Stress Concern Present (3/21/2024)    [employer] - Occupational Stress Questionnaire     Feeling of Stress : To some extent   Housing Stability: Low Risk  (3/21/2024)    Housing Stability Vital Sign     Unable to Pay for Housing in the Last Year: No     Number of Places Lived in the Last Year: 1     Unstable Housing in the Last Year: No       Family History   Problem Relation Name Age of Onset    Rectal cancer Mother      Breast cancer Maternal Aunt         Review of patient's allergies indicates:   Allergen Reactions    Carboplatin Other (See Comments)     Chest tightness and flushing       Current Outpatient Medications:     diphenoxylate-atropine 2.5-0.025 mg (LOMOTIL) 2.5-0.025 mg per tablet, Take 1 tablet by mouth 4 (four) times daily as needed for Diarrhea., Disp: 30 tablet, Rfl: 1    duke's soln (benadryl 30 mL, mylanta 30 mL, LIDOcaine 30 mL, nystatin 30 mL) 120mL, Take 10 mLs by mouth 4 (four) times daily. (Patient not taking: Reported on 12/5/2024), Disp: 240 mL, Rfl: 1    HYDROcodone-acetaminophen (NORCO) 5-325 mg per tablet, Take 1 tablet by mouth every 8 (eight) hours as needed for Pain. (Patient not taking: Reported on 12/5/2024), Disp: 15 tablet, Rfl: 0    ondansetron (ZOFRAN) 8 MG tablet, Take 1 tablet (8 mg total) by mouth every 8 (eight) hours as needed for Nausea. (Patient not taking: Reported on 12/5/2024), Disp: 30 tablet, Rfl: 2    promethazine (PHENERGAN) 25 MG tablet, Take 1 tablet (25 mg total) by mouth every 4 to 6 hours as needed for  "Nausea. (Patient not taking: Reported on 12/5/2024), Disp: 30 tablet, Rfl: 2    All medications and past history have been reviewed.    Review of Systems   Constitutional:  Negative for appetite change and unexpected weight change.   HENT:  Negative for mouth sores.    Eyes:  Negative for visual disturbance.   Respiratory:  Negative for cough and shortness of breath.    Cardiovascular:  Negative for chest pain.   Gastrointestinal:  Negative for abdominal pain and diarrhea.   Genitourinary:  Negative for frequency.   Musculoskeletal:  Positive for back pain.   Skin:  Negative for rash.   Neurological:  Negative for headaches.   Hematological:  Negative for adenopathy.   Psychiatric/Behavioral:  The patient is not nervous/anxious.        Objective:        /81 (BP Location: Right arm)   Pulse 89   Temp 98 °F (36.7 °C) (Temporal)   Resp 16   Ht 5' 4" (1.626 m)   Wt 63 kg (139 lb)   BMI 23.86 kg/m²     Physical Exam  Constitutional:       Appearance: Normal appearance.   HENT:      Head: Normocephalic and atraumatic.      Mouth/Throat:      Mouth: Mucous membranes are moist.   Cardiovascular:      Rate and Rhythm: Normal rate and regular rhythm.      Pulses: Normal pulses.      Heart sounds: Normal heart sounds.   Pulmonary:      Effort: Pulmonary effort is normal. No respiratory distress.      Breath sounds: Normal breath sounds. No wheezing.   Abdominal:      General: There is no distension.      Palpations: Abdomen is soft. There is no mass.      Tenderness: There is no abdominal tenderness.   Musculoskeletal:         General: No swelling. Normal range of motion.      Right lower leg: No edema.      Left lower leg: No edema.   Skin:     General: Skin is warm and dry.      Capillary Refill: Capillary refill takes 2 to 3 seconds.      Findings: No bruising or rash.   Neurological:      Mental Status: She is alert and oriented to person, place, and time. Mental status is at baseline.      Motor: No weakness. "   Psychiatric:         Mood and Affect: Mood normal.         Behavior: Behavior normal.           Lab:    Contains abnormal data CBC W/ AUTO DIFFERENTIAL  Order: [id number]   Ref Range & Units 3 d ago   WBC 4.8 - 10.8 iglesia/L 4.3 Low    RBC 4.50 - 5.50 tril/L 3.98 Low    Hemoglobin 12.0 - 15.0 g/dL 13   Hematocrit 37.0 - 47.0 % 37   MCV 81 - 97 fL 93   MCH 27 - 32 pg 33 High    MCHC 32 - 36 g/dL 35   RDW 11.5 - 14.5 % 14.3   Platelets 150 - 400 iglesia/L 308   MPV 7.4 - 10.4 fL 6.7 Low    Granulocytes % 42.2 - 75.2 % 75.3 High    Lymphocytes % 20.5 - 51.1 % 9.1 Low    Monocytes % 1.7 - 9.3 % 12.5 High    Eosinophils % 0.0 - <5.0 % 2.3   Basophils % 0.0 - <5.0 % 0.8   Granulocytes Absolute 1.4 - 6.5 K/UL 3.2   Lymphocytes, Absolute 1.2 - 3.4 K/uL 0.4 Low    Monocyte Absolute 0.11 - 0.59 K/uL 0.5   Eosinophils, Absolute 0.00 - 0.70 K/UL 0.1   Basophils, Absolute 0.00 - 0.20 K/UL 0   ANC 1400 - 6500 /uL 3,200   nRBC% 0 - 0 /100 0   Resulting Agency  [facility]      Contains abnormal data COMPREHENSIVE METABOLIC PANEL  Order: [id number]   Ref Range & Units 3 d ago   Sodium 136 - 145 mmol/L 139   Potassium 3.4 - 4.5 mmol/L 4   Chloride 98 - 107 mmol/L 103   CO2 21 - 31 mmol/L 30   BUN 7 - 25 mg/dL 11   Creatinine 0.60 - 1.30 mg/dL 0.57 Low    Glucose 74 - 109 mg/dL 88   Calcium 8.6 - 10.3 mg/dL 9.5   Albumin 3.5 - 5.2 g/dL 4   AST 13 - 39 U/L 17   ALT 7 - 52 U/L 14   Alkaline Phosphatase 34 - 104 U/L 126 High    Total Bilirubin 0.3 - 1.0 mg/dL 0.5   Protein Total 6.4 - 8.9 g/dL 6.9   Anion Gap 3 - 15 mmol/L 6   Albumin/Globulin Ratio 1.0 - 2.0 1.4   Globulin 2.0 - 4.0 g/dL 2.9   Osmolality Calc 275 - 295 mOsmol/kg 276   eGFR >=60 mL/min 96       Radiology/Diagnostic Studies:  No results found for this or any previous visit (from the past 2160 hours).  No results found for this or any previous visit (from the past 2160 hours).      All lab results and imaging results have been reviewed and discussed with the patient.    Assessment/Plan:       1. Invasive ductal carcinoma of breast, female, right    2. Chemotherapy-induced nausea    3. Iron deficiency anemia secondary to inadequate dietary iron intake      Invasive ductal carcinoma of breast, female, right    Chemotherapy-induced nausea    Iron deficiency anemia secondary to inadequate dietary iron intake        PLAN:   Continue herceptin x 1 year   Echo every 3 months   Labs stable   Following up with Dr. Guthrie due to Dr. Treviño retiring   Mammogram and US due in Feb      Cancer Staging   Invasive ductal carcinoma of breast, female, right  Staging form: Breast, AJCC 8th Edition  - Clinical: Stage IIIB (cT1b, cN3c, cM0, G2, ER-, ND-, HER2+) - Signed by Meño Villanueva Jr., MD on 7/1/2024  - Pathologic: ypT0, pN0(sn), cM0 - Signed by Meño Villanueva Jr., MD on 11/12/2024        I have explained and the patient understands all of  the current recommendation(s). I have answered all of their questions to the best of my ability and to their complete satisfaction.   The patient is to continue with the current management plan.            Electronically signed by: Isabell Gayle, MSN, APRN, AGNP-C

## 2024-12-06 ENCOUNTER — INFUSION (OUTPATIENT)
Dept: INFUSION THERAPY | Facility: HOSPITAL | Age: 74
End: 2024-12-06
Attending: INTERNAL MEDICINE
Payer: MEDICARE

## 2024-12-06 VITALS
HEART RATE: 87 BPM | OXYGEN SATURATION: 98 % | HEIGHT: 64 IN | BODY MASS INDEX: 23.64 KG/M2 | SYSTOLIC BLOOD PRESSURE: 139 MMHG | RESPIRATION RATE: 18 BRPM | TEMPERATURE: 97 F | DIASTOLIC BLOOD PRESSURE: 84 MMHG | WEIGHT: 138.5 LBS

## 2024-12-06 DIAGNOSIS — C50.911 INVASIVE DUCTAL CARCINOMA OF BREAST, FEMALE, RIGHT: ICD-10-CM

## 2024-12-06 DIAGNOSIS — T45.1X5A CHEMOTHERAPY INDUCED NEUTROPENIA: Primary | ICD-10-CM

## 2024-12-06 DIAGNOSIS — D70.1 CHEMOTHERAPY INDUCED NEUTROPENIA: Primary | ICD-10-CM

## 2024-12-06 PROCEDURE — 25000003 PHARM REV CODE 250: Performed by: INTERNAL MEDICINE

## 2024-12-06 PROCEDURE — 63600175 PHARM REV CODE 636 W HCPCS: Performed by: INTERNAL MEDICINE

## 2024-12-06 PROCEDURE — 96413 CHEMO IV INFUSION 1 HR: CPT

## 2024-12-06 PROCEDURE — A4216 STERILE WATER/SALINE, 10 ML: HCPCS | Performed by: INTERNAL MEDICINE

## 2024-12-06 RX ORDER — HEPARIN 100 UNIT/ML
500 SYRINGE INTRAVENOUS
Status: DISCONTINUED | OUTPATIENT
Start: 2024-12-06 | End: 2024-12-06 | Stop reason: HOSPADM

## 2024-12-06 RX ORDER — EPINEPHRINE 0.3 MG/.3ML
0.3 INJECTION SUBCUTANEOUS ONCE AS NEEDED
Status: DISCONTINUED | OUTPATIENT
Start: 2024-12-06 | End: 2024-12-06 | Stop reason: HOSPADM

## 2024-12-06 RX ORDER — DIPHENHYDRAMINE HYDROCHLORIDE 50 MG/ML
50 INJECTION INTRAMUSCULAR; INTRAVENOUS ONCE AS NEEDED
Status: DISCONTINUED | OUTPATIENT
Start: 2024-12-06 | End: 2024-12-06 | Stop reason: HOSPADM

## 2024-12-06 RX ORDER — SODIUM CHLORIDE 0.9 % (FLUSH) 0.9 %
10 SYRINGE (ML) INJECTION
Status: DISCONTINUED | OUTPATIENT
Start: 2024-12-06 | End: 2024-12-06 | Stop reason: HOSPADM

## 2024-12-06 RX ADMIN — Medication 10 ML: at 08:12

## 2024-12-06 RX ADMIN — TRASTUZUMAB-ANNS 373 MG: 420 INJECTION, POWDER, LYOPHILIZED, FOR SOLUTION INTRAVENOUS at 08:12

## 2024-12-06 RX ADMIN — HEPARIN 500 UNITS: 100 SYRINGE at 08:12

## 2024-12-06 NOTE — PLAN OF CARE
Problem: Fall Injury Risk  Goal: Absence of Fall and Fall-Related Injury  Outcome: Progressing  Intervention: Identify and Manage Contributors  Flowsheets (Taken 12/6/2024 0745)  Self-Care Promotion: independence encouraged  Medication Review/Management: medications reviewed  Intervention: Promote Injury-Free Environment  Flowsheets (Taken 12/6/2024 0745)  Safety Promotion/Fall Prevention: medications reviewed

## 2024-12-26 RX ORDER — HEPARIN 100 UNIT/ML
500 SYRINGE INTRAVENOUS
Status: CANCELLED | OUTPATIENT
Start: 2024-12-27

## 2024-12-26 RX ORDER — SODIUM CHLORIDE 0.9 % (FLUSH) 0.9 %
10 SYRINGE (ML) INJECTION
Status: CANCELLED | OUTPATIENT
Start: 2024-12-27

## 2024-12-26 RX ORDER — DIPHENHYDRAMINE HYDROCHLORIDE 50 MG/ML
50 INJECTION INTRAMUSCULAR; INTRAVENOUS ONCE AS NEEDED
Status: CANCELLED | OUTPATIENT
Start: 2024-12-27

## 2024-12-26 RX ORDER — EPINEPHRINE 0.3 MG/.3ML
0.3 INJECTION SUBCUTANEOUS ONCE AS NEEDED
Status: CANCELLED | OUTPATIENT
Start: 2024-12-27

## 2024-12-27 ENCOUNTER — INFUSION (OUTPATIENT)
Dept: INFUSION THERAPY | Facility: HOSPITAL | Age: 74
End: 2024-12-27
Attending: INTERNAL MEDICINE
Payer: MEDICARE

## 2024-12-27 VITALS
SYSTOLIC BLOOD PRESSURE: 128 MMHG | WEIGHT: 140.38 LBS | BODY MASS INDEX: 23.97 KG/M2 | DIASTOLIC BLOOD PRESSURE: 78 MMHG | RESPIRATION RATE: 18 BRPM | HEIGHT: 64 IN | HEART RATE: 90 BPM | TEMPERATURE: 98 F | OXYGEN SATURATION: 98 %

## 2024-12-27 DIAGNOSIS — D70.1 CHEMOTHERAPY INDUCED NEUTROPENIA: Primary | ICD-10-CM

## 2024-12-27 DIAGNOSIS — T45.1X5A CHEMOTHERAPY INDUCED NEUTROPENIA: Primary | ICD-10-CM

## 2024-12-27 DIAGNOSIS — C50.911 INVASIVE DUCTAL CARCINOMA OF BREAST, FEMALE, RIGHT: ICD-10-CM

## 2024-12-27 PROCEDURE — A4216 STERILE WATER/SALINE, 10 ML: HCPCS | Performed by: INTERNAL MEDICINE

## 2024-12-27 PROCEDURE — 25000003 PHARM REV CODE 250: Performed by: INTERNAL MEDICINE

## 2024-12-27 PROCEDURE — 96413 CHEMO IV INFUSION 1 HR: CPT

## 2024-12-27 PROCEDURE — 63600175 PHARM REV CODE 636 W HCPCS: Performed by: INTERNAL MEDICINE

## 2024-12-27 RX ORDER — HEPARIN 100 UNIT/ML
500 SYRINGE INTRAVENOUS
Status: DISCONTINUED | OUTPATIENT
Start: 2024-12-27 | End: 2024-12-27 | Stop reason: HOSPADM

## 2024-12-27 RX ORDER — SODIUM CHLORIDE 0.9 % (FLUSH) 0.9 %
10 SYRINGE (ML) INJECTION
Status: DISCONTINUED | OUTPATIENT
Start: 2024-12-27 | End: 2024-12-27 | Stop reason: HOSPADM

## 2024-12-27 RX ORDER — EPINEPHRINE 0.3 MG/.3ML
0.3 INJECTION SUBCUTANEOUS ONCE AS NEEDED
Status: DISCONTINUED | OUTPATIENT
Start: 2024-12-27 | End: 2024-12-27 | Stop reason: HOSPADM

## 2024-12-27 RX ORDER — DIPHENHYDRAMINE HYDROCHLORIDE 50 MG/ML
50 INJECTION INTRAMUSCULAR; INTRAVENOUS ONCE AS NEEDED
Status: DISCONTINUED | OUTPATIENT
Start: 2024-12-27 | End: 2024-12-27 | Stop reason: HOSPADM

## 2024-12-27 RX ADMIN — TRASTUZUMAB-ANNS 373 MG: 420 INJECTION, POWDER, LYOPHILIZED, FOR SOLUTION INTRAVENOUS at 08:12

## 2024-12-27 RX ADMIN — Medication 10 ML: at 08:12

## 2024-12-27 RX ADMIN — HEPARIN 500 UNITS: 100 SYRINGE at 08:12

## 2024-12-27 NOTE — PLAN OF CARE
Problem: Fall Injury Risk  Goal: Absence of Fall and Fall-Related Injury  Outcome: Progressing  Intervention: Identify and Manage Contributors  Flowsheets (Taken 12/27/2024 0742)  Self-Care Promotion: independence encouraged  Medication Review/Management: medications reviewed  Intervention: Promote Injury-Free Environment  Flowsheets (Taken 12/27/2024 0742)  Safety Promotion/Fall Prevention: medications reviewed

## 2025-01-06 ENCOUNTER — OFFICE VISIT (OUTPATIENT)
Facility: CLINIC | Age: 75
End: 2025-01-06
Payer: MEDICARE

## 2025-01-06 ENCOUNTER — TELEPHONE (OUTPATIENT)
Facility: CLINIC | Age: 75
End: 2025-01-06

## 2025-01-06 VITALS
WEIGHT: 137 LBS | RESPIRATION RATE: 16 BRPM | BODY MASS INDEX: 23.39 KG/M2 | HEIGHT: 64 IN | HEART RATE: 91 BPM | SYSTOLIC BLOOD PRESSURE: 108 MMHG | DIASTOLIC BLOOD PRESSURE: 71 MMHG | TEMPERATURE: 97 F

## 2025-01-06 DIAGNOSIS — R07.89 FEELING OF CHEST TIGHTNESS: Primary | ICD-10-CM

## 2025-01-06 PROCEDURE — 1101F PT FALLS ASSESS-DOCD LE1/YR: CPT | Mod: CPTII,S$GLB,, | Performed by: INTERNAL MEDICINE

## 2025-01-06 PROCEDURE — 3078F DIAST BP <80 MM HG: CPT | Mod: CPTII,S$GLB,, | Performed by: INTERNAL MEDICINE

## 2025-01-06 PROCEDURE — 3288F FALL RISK ASSESSMENT DOCD: CPT | Mod: CPTII,S$GLB,, | Performed by: INTERNAL MEDICINE

## 2025-01-06 PROCEDURE — 99999 PR PBB SHADOW E&M-EST. PATIENT-LVL III: CPT | Mod: PBBFAC,,, | Performed by: INTERNAL MEDICINE

## 2025-01-06 PROCEDURE — 3008F BODY MASS INDEX DOCD: CPT | Mod: CPTII,S$GLB,, | Performed by: INTERNAL MEDICINE

## 2025-01-06 PROCEDURE — 1125F AMNT PAIN NOTED PAIN PRSNT: CPT | Mod: CPTII,S$GLB,, | Performed by: INTERNAL MEDICINE

## 2025-01-06 PROCEDURE — G2211 COMPLEX E/M VISIT ADD ON: HCPCS | Mod: S$GLB,,, | Performed by: INTERNAL MEDICINE

## 2025-01-06 PROCEDURE — 99215 OFFICE O/P EST HI 40 MIN: CPT | Mod: S$GLB,,, | Performed by: INTERNAL MEDICINE

## 2025-01-06 PROCEDURE — 1159F MED LIST DOCD IN RCRD: CPT | Mod: CPTII,S$GLB,, | Performed by: INTERNAL MEDICINE

## 2025-01-06 PROCEDURE — 3074F SYST BP LT 130 MM HG: CPT | Mod: CPTII,S$GLB,, | Performed by: INTERNAL MEDICINE

## 2025-01-06 NOTE — LETTER
January 6, 2025        Demarco Meza MD  [address]  [address]  [place] [address]             [facility]  [address]  [place] [address]  Phone: [phone]  Fax: [phone]   Patient: Dora Atwood   MR Number: [id number]   YOB: 1950   Date of Visit: 1/6/2025       Dear Dr. Meza:    Thank you for referring Dora Atwood to me for evaluation. Below are the relevant portions of my assessment and plan of care.            If you have questions, please do not hesitate to call me. I look forward to following Dora along with you.    Sincerely,      SIMONE Brooks MD           CC  No Recipients

## 2025-01-06 NOTE — PROGRESS NOTES
"To Dr. Demarco Meza,  Here is my note from 1/6/25.  I have asked her to see you for "chest tightness", over last 3-4 weeks.  EKG being done HH.  ECHO 11/2024 65% EJ.  I do not think symptoms are breast related.    Alexys Boyer MD  1/6/25            [facility] [address] Hematology Oncology Subsequent  encounter note    1/6/25    Subjective:      Patient ID:   Dora Atwood  74 y.o. female  1950  Waleska Plasencia, Javier Villanueva MD's      Chief Complaint:   R breast Cancer    HPI:   74 y.o. female palpated a hard lump at the area of the tail of the right breast and the anterior right axilla.  The mass was hard, large, but movable and not fixed.  Dr. Meza, her PMD ordered a bilateral diagnostic mammogram and ultrasound.      She has inversion of the left and right nipple, she says this is a chronic finding and her breasts have always been with the inversion.  Pleomorphic microcalcifications extending from the upper central to the upper outer quadrant of the right breast are noted and cover an area of approximately 4 x 8 cm, these are new from prior studies.  The left breast shows no masses or microcalcifications.  There is a large mass at the right axilla measuring 4.5 cm in diameter.      Ultrasound of right breast and axilla show a 1 cm mass 3 cm from the nipple at the 9 o'clock position.  In the skin of the lateral and inferior right breast there are numerous small hypoechoic nodules on the order of 3-6 mm in size.  Ultrasound of the axilla shows a large mass with irregular borders at 3 x 4.8 cm.  Just lateral to this mass is a smaller mass at 3 x 2 cm.  The radiologist recommends biopsy of the nodule at 9:00 a.m. and the large right axillary mass presumed to be a metastatic lymph node.  He notes if the biopsy of the right breast nodule fails to demonstrate concordant histologic diagnosis, then stereotactic biopsy of the microcalcifications can be performed.    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  Focal ductal carcinoma insight 2, intermediate grade, solid pattern was seen.  ERP was negative, PRP was negative, HER2 Lisa was 3+ positive    Biopsy the right axilla lymph node returned positive for metastatic carcinoma.  Residual lymphoid architecture is not seen.    She had onset of menses at age 14.  She is a  2 para 2 miscarriage 0 individual.  She delivered her 1st child at the maternal age of 25 and the 2nd child at the maternal age of 30.  She took birth control pills for approximately 10 years.  She had bilateral tubal ligation at age 30.  She went through the menopause in her mid 40s and she did take hormone replacement therapy for approximately 10 years.      Mom had rectal cancer, dad had diabetes and heart disease.  A paternal aunt had breast cancer, maternal uncle  in his 20s of some type of cancer.  A brother has heart disease.  Another brother  of an overdose, and a 3rd brother  of unknown cause.  She has a sister that she believes may have type of cancer and another sister with diabetes.  Her son and daughter are alive and well.  There is no family history known to her of ovarian, prostate, pancreas, or melanoma cancers.      CAT of head negative for metastatic dx.  ECHO Ej Fx 65-68%  MRI of breast done 3/21/24.  I spoke with Radiology, they are awaiting mamm from , should have a report by 14 days, 24.  PET uptake in mass, R supraclavicular LN, infraclavicular LN, internal mammary LN, R axillary LN, and mild increase in FGD  with skin thickening and edema.    This week, tast is improved, BM NL.  Next Rx 5/15/24, adjusted dosages.  PN at L hand resolved.  Energy 8/10  Cataract surgery 2024.     May 15th she was much improved and in better spirits.  Taste has returned to normal at this time and appetite has recovered, she actually gets hungry.  Her BMI is  formed.  Today is day 1 of her 3rd cycle of chemotherapy.  And she does expect that her symptoms will recur later this weekend.  She does have her scopolamine patch in place, without nausea today, she did not have to take Zofran or Phenergan today.  She is going to take the Lomotil on Saturday x3 or 4 days preventively and then will take the Lomotil again if diarrhea symptoms recur.    Now on exam the anterior axillary lymph node is not palpable and the breast mass laterally is not palpable after 2 cycles of chemotherapy.  She did she is developing a small hernia at the site of her previous gallbladder surgery.  The hernia is nontender and easily reducible.    Her hemoglobin is 8.7 and her ferritin returned low at 21.  She could not tolerate oral iron because of GI symptoms.  She does agree to injected for or iron infusion weekly at the [facility] outpatient [facility].  There is a 1 or 2% risk of reaction to iron infusion.  Will give her Benadryl as a premedication and will observe her for 1 hour after each infusion.  Iron replenishment here will facilitate red blood cell production and improve her hemoglobin and should improve her energy.    S/P CTHP x's 4, marked fatigue, altered taste, 4 # weight loss.  For PET 6/28/24.  Defer course % to 7/3 or 7/5 after the PET.  Decide on 4 or 6 cycles of CT after reviewing Pet results.  Continue H and or P after PET.  On Venofer 4/10 to replenish Fe stores.      As of today, she has completed 6 of 10 Fe infusions, no increase in energy yet.  PET scan R breast mass resolved,  lymphadenopathy resolved after 4 cycles of chemo.  Stop chemotherapy now.  She is CR now.    She is BRCA 2 positive.  At this point, 1 option to go with right lumpectomy and sentinel node biopsy followed by adjuvant radiation therapy to the right breast and axilla and other LN areas as per Dr. Villanueva.    Another option is right mastectomy and sentinel node biopsy.  Per Dr. Villanueva's  note, she would still need radiation therapy after mastectomy.    Another option would be to go with right mastectomy and sentinel node biopsy with prophylactic left mastectomy.  Estimated risk of new primary breast cancer would be 2 or 3% after bilateral mastectomy here.    She has decided on R partial mastectomy.  Sentinal node bx,  I believe this is a reasonable option for her.  She follows up with Dr. Treviño 5/24/24 to decide on surgery date.  She will need Rad Rx to breast, axilla, other lissette areas as per Dr. Villanueva.    If residual Dx is found, then Xeloda x's 6 months will be discussed.  No residual Dx seen.    She had R partial mastectomy, SNBx and Bx of area of microcalcifications.  Path reports 7/30/24 and 8/13/24 No Residual Dx.  She is ERP and PRP negative and H2N positive.    She will need adjuvant H2N Rx for 1 year, H, after Rad Rx completed.   She return to work as mentor teacher in 10/2024.  She completed Rad Rx.   Skin at breast is pealing.    Due to begin herceptin q 3 weeks, 11/15/24, D1C1.  See Isabell 2 weeks, Check lab q 3 weeks.  D1C2 in 3 weeks 12/6/24.  Discussed following ECHO to check for cardiac toxicity 2nd Herceptin.  Current ECHO Ej Fx 65%.  Watch for diarrhea sx 2nd Herceptin, refill Lomotil qid prn.    Echocardiogram from November 20, 2024 shows an ejection fraction of 65%.  She returns to the office today.  She complains of intermittent chest tightness.  Also right-sided breast tenderness.  I placed an order for an EKG at [facility] and have asked her to see Dr. Derrick HATFIELD for evaluation of her symptoms.  She complains of easy fatigue, energy level is 5 to 7/10.  Her  reports energy level at 5/10.  She is mildly anemic and has an iron deficiency.  Stools for occult blood will be checked.  Will place her on iron infusions again to replenish iron stores and facilitate red blood cell production here.  Hemoglobin is 10.8 and ferritin is 17.  Iron infusion will be given at  "[facility].  She will follow up with myself in 4-6 weeks.      ROS:   GEN: normal without any fever, night sweats or weight loss  HEENT: normal with no HA's, sore throat, stiff neck, changes in vision  CV: normal with no CP, SOB, PND, CARNEY or orthopnea  PULM: normal with no SOB, cough, hemoptysis, sputum or pleuritic pain  GI: normal with no abdominal pain, nausea, vomiting, constipation, diarrhea, melanotic stools, BRBPR, or hematemesis  : normal with no hematuria, dysuria  BREAST: See HPI  SKIN: See HPI     Hx BTL, cholecystectomy.    Review of patient's allergies indicates:   Allergen Reactions    Carboplatin Other (See Comments)     Chest tightness and flushing       No meds.    Objective:   Vitals:  Blood pressure 108/71, pulse 91, temperature 97.3 °F (36.3 °C), temperature source Temporal, resp. rate 16, height 5' 4" (1.626 m), weight 62.1 kg (137 lb).    Physical Examination:   GEN: no apparent distress, comfortable  HEAD: atraumatic and normocephalic  EYES: no pallor, no icterus  ENT: no pharyngeal erythema, external ears WNL; no nasal discharge  NECK: no masses, thyroid normal, trachea midline, no LAD/LN's, supple  CV: RRR with no murmur; normal pulse  CHEST: Normal respiratory effort; CTAB; normal breath sounds; no wheeze or crackles  ABDOM: nontender and nondistended; soft; normal bowel sounds; no rebound/guarding  MUSC/Skeletal: ROM normal; no crepitus; joints normal; no deformities or arthropathy  EXTREM: no clubbing, cyanosis, inflammation or swelling  SKIN: no rashes, lesions, ulcers, petechiae or subcutaneous nodules  : no cvat  NEURO: grossly intact; motor/sensory WNL; no tremors  PSYCH: normal mood, affect and behavior  LYMPH: normal cervical, supraclavicular, and groin LN's  BREASTS:  Left breast is nontender, without discharge, and without palpable mass, left axilla is without palpable mass or lymphadenopathy.  R breast NT, incisions closed and healing, no palpable mass or " lymphadenopathy.    Echocardiogram for ejection fraction will be ordered.  MRI of the brain with contrast for staging will be done.  MRI of the left and right breast with contrast to check for a 2nd primary in the left breast, given her lobular features, will be done.  PET scan is being done for staging, to check for metastatic disease?    Assessment:   (1) 74 y.o. female abnormal mammogram and ultrasound with 4.8 cm mass noted at the anterior axilla and tell of the breast, also a large area of new micro calcifications are seen in the upper outer quadrant of the right breast, and a 1 cm nodule is noted at the 9 o'clock position of the right breast.    (2) biopsy of the 9:00 a.m. nodule and the anterior axillary mass returned showing invasive mixed ductal and lobular carcinoma, grade 2, ERP negative, PRP negative, HER2 Lisa 3+ positive.    (3) my impression at this time is that she is at least stage II.  Staging studies will include MRI with contrast of the brain, MRI with contrast of the left and right breast, PET scan will be done to check for metastatic disease.  CBC, CMP, breast tumor markers have been requested and echocardiogram for ejection fraction has been requested.    (4) because of the negative ERP and PRP studies, tamoxifen or Arimidex would not be expected to help adjuvantly in decreasing her long-term systemic recurrence risk.    (5) the strongly 3+ positive HER2 Lisa would support that neoadjuvant carboplatin, taxane, Herceptin, Perjeta q. 3 weeks times 4-6 cycle may achieve a CHRIS state at the breast and axillary area after treatment.    (6) she does have the large area microcalcifications at the right breast in addition to the 9:00 a.m. biopsy-proven mass at the right breast.  The initial opinion as discussed with Dr. Villanueva, would be that she would require a right mastectomy and probably a right axillary node dissection, after the completion of neoadjuvant CTHP    (7) as discussed with Dr. Villanueva,  she will probably need chest irradiation and right axillary radiation, after her definitive surgery.    (8) will forward a copy of this note to Dr. Treviño, she sees him on Wednesday.  Will ask Dr. Treviño to place a carisa catheter to facilitate systemic neoadjuvant treatment.    For now the PET scan is scheduled March 6 at 3:45 p.m..  Echocardiogram is scheduled at March 8th at 2:30 p.m..  MRI of brain is scheduled at March 8th at 3:00 p.m.  MRI of the breast is scheduled for March 21st at 3:15 p.m.  Iram is going to work on trying to get the MRI of the breast sooner.    (9) knowledge of her family history of cancer is incomplete.  A paternal aunt had breast cancer, a maternal uncle had cancer of unknown type, a sister gives a history of possible cancer.  I have requested BRCA 1 and BRCA 2 testing with an extended profile.    I am going to ask her to follow up with myself during the week of March 11th to review the study results.  I am trying to get her started on the neoadjuvant treatment towards the end of March 11th week after the port has been placed and the local site has healed sufficiently.    I have spent 1 hour interviewing and the patient and examining the patient and discussing my findings and recommendations as outlined above.  It has taken me approximately another hour to summarize the reports, discuss with Dr. Villanueva, schedule her studies with Iram and to place this note into [facility],  On the visit at the comprehensive clinic last time.    Today 3/15/24  call for Ej Fx, await MRI of breasts, port placement.    CTHP neoadjuvant Rx q 3 weeks x's 4-6 cycles.  D1C1 4/3/24.  At the end of Carboplatin infusion, she c/o chest pain.Isabell whitman NP saw her, medicated and sx resolved.  Suspected reaction to carboplatin, first infusion.    Cytoxan, Taxotere, Herceptin, Perjeta.  5/15/24.  Adjust dosages.    I think the hernia at the incision site where the gallbladder surgery was done can probably be repaired at a  "later time electively and does not appear to be emergency at this point.    She did have cataract surgery in January 2024 and is due to have some follow-up procedure done as part of that management.  She does have some blurriness of her vision.  I recommended that she defer the additional cataract procedure if feasible in the near future until we get her off of the chemotherapy and steroids and her other medications as these medicines may be contributing to her visual symptoms.    Hemoglobin is 8.7 and ferritin is 21.  She has iron deficiency anemia.  She did not tolerate oral iron supplementation.  Have discussed with her iron infusion.  I would plan to give her injectafer weekly x2 weeks.  Will premedicate her with Benadryl to reduce the risk of reaction to iron infusion.  I would estimate a 1-2% risk of reaction to iron infusion.  The iron infusion will be given at the [facility] outpatient Department clinic.  Iron administration should facilitate red blood cell production here and improve her hemoglobin up to or close to the normal range and should improve her energy level.      R partial mastectomy, SNB 7/30/24, microcalcification bx 8/13/24 CHRIS, no residual disease.  138#  5'5"    S/P Rad Rx.  Adjuvant anti H2N Rx x's 1 year.  Herceptin.  Monitor Ej Fx.    Echocardiogram from November shows good ejection fraction at 65%.  She is to see Dr. Meza for complaints of chest tightness evaluation.  I ordered an EKG to be done at [facility].  Will defer to Dr. Meza if she needs cardiac referral or not?    She has mild anemia with iron deficiency, will order iron infusions through [facility], will check her stools for occult blood.  Follow up to see myself in 4-6 weeks                                +                      "

## 2025-01-08 ENCOUNTER — TELEPHONE (OUTPATIENT)
Facility: CLINIC | Age: 75
End: 2025-01-08
Payer: MEDICARE

## 2025-01-08 DIAGNOSIS — D50.8 IRON DEFICIENCY ANEMIA SECONDARY TO INADEQUATE DIETARY IRON INTAKE: ICD-10-CM

## 2025-01-08 DIAGNOSIS — D50.9 IRON DEFICIENCY ANEMIA, UNSPECIFIED IRON DEFICIENCY ANEMIA TYPE: ICD-10-CM

## 2025-01-08 DIAGNOSIS — C50.911 INVASIVE DUCTAL CARCINOMA OF BREAST, FEMALE, RIGHT: Primary | ICD-10-CM

## 2025-01-08 NOTE — TELEPHONE ENCOUNTER
----- Message from Sloane sent at 1/8/2025  2:52 PM CST -----  Pt calling to check on progress for her testing at [facility], she has not been contacted about anything being scheduled    [phone]

## 2025-01-14 ENCOUNTER — TELEPHONE (OUTPATIENT)
Facility: CLINIC | Age: 75
End: 2025-01-14
Payer: MEDICARE

## 2025-01-14 ENCOUNTER — TELEPHONE (OUTPATIENT)
Dept: SURGERY | Facility: CLINIC | Age: 75
End: 2025-01-14
Payer: MEDICARE

## 2025-01-14 RX ORDER — DIPHENHYDRAMINE HYDROCHLORIDE 50 MG/ML
50 INJECTION INTRAMUSCULAR; INTRAVENOUS ONCE AS NEEDED
OUTPATIENT
Start: 2025-01-14

## 2025-01-14 RX ORDER — HEPARIN 100 UNIT/ML
500 SYRINGE INTRAVENOUS
OUTPATIENT
Start: 2025-01-14

## 2025-01-14 RX ORDER — SODIUM CHLORIDE 0.9 % (FLUSH) 0.9 %
10 SYRINGE (ML) INJECTION
OUTPATIENT
Start: 2025-01-14

## 2025-01-14 RX ORDER — EPINEPHRINE 0.3 MG/.3ML
0.3 INJECTION SUBCUTANEOUS ONCE AS NEEDED
Status: CANCELLED | OUTPATIENT
Start: 2025-01-14

## 2025-01-14 RX ORDER — EPINEPHRINE 0.3 MG/.3ML
0.3 INJECTION SUBCUTANEOUS ONCE AS NEEDED
OUTPATIENT
Start: 2025-01-14

## 2025-01-14 RX ORDER — SODIUM CHLORIDE 0.9 % (FLUSH) 0.9 %
10 SYRINGE (ML) INJECTION
Status: CANCELLED | OUTPATIENT
Start: 2025-01-14

## 2025-01-14 RX ORDER — DIPHENHYDRAMINE HYDROCHLORIDE 50 MG/ML
50 INJECTION INTRAMUSCULAR; INTRAVENOUS ONCE AS NEEDED
Status: CANCELLED | OUTPATIENT
Start: 2025-01-14

## 2025-01-14 RX ORDER — HEPARIN 100 UNIT/ML
500 SYRINGE INTRAVENOUS
Status: CANCELLED | OUTPATIENT
Start: 2025-01-14

## 2025-01-14 NOTE — TELEPHONE ENCOUNTER
Spoke to Patient. She explained that she had seen Dr. Boyer the first of January and he stated he wanted her to have iron infusion and she had not heard anything.  Venofer ordered per 's note on 1/6/25 and pended to Dr. Poole.

## 2025-01-14 NOTE — TELEPHONE ENCOUNTER
Referred by Dr Treviño to get established with Dr Bassett.  She has a mammogram scheduled on 2-7-25 so it would be best to schedule after that so Dr Bassett can discuss the results.  Appointment scheduled 2-18-25 at 3 pm.

## 2025-01-14 NOTE — TELEPHONE ENCOUNTER
----- Message from Regina sent at 1/14/2025 10:38 AM CST -----  Regarding: Sooner Appointment Request  Contact: atient at [phone]  Type:  Sooner Appointment Request      Name of Caller:  patient at [phone]  Symptoms:  establish care  Would the patient rather a call back or a response via [employer]? Call back    Additional Information:  Patient would like to schedule a appointment. Please call and advise thank you.

## 2025-01-15 ENCOUNTER — TELEPHONE (OUTPATIENT)
Facility: CLINIC | Age: 75
End: 2025-01-15
Payer: MEDICARE

## 2025-01-15 DIAGNOSIS — D50.8 IRON DEFICIENCY ANEMIA SECONDARY TO INADEQUATE DIETARY IRON INTAKE: ICD-10-CM

## 2025-01-15 DIAGNOSIS — C50.911 INVASIVE DUCTAL CARCINOMA OF BREAST, FEMALE, RIGHT: Primary | ICD-10-CM

## 2025-01-15 NOTE — TELEPHONE ENCOUNTER
----- Message from Day sent at 1/13/2025  8:36 AM CST -----  Pt called and requested orders for her iron infusion to be sent [place]. The referral is in the system, however I don't see an order. I spoke to Ronda @ [facility] and she said she as soon as she gets orders she will schedule the patient.     Ronda @ [facility] [phone]  [place] outpatient fax# [phone]    PT CB# [phone]

## 2025-01-16 RX ORDER — HEPARIN 100 UNIT/ML
500 SYRINGE INTRAVENOUS
Status: CANCELLED | OUTPATIENT
Start: 2025-01-17

## 2025-01-16 RX ORDER — EPINEPHRINE 0.3 MG/.3ML
0.3 INJECTION SUBCUTANEOUS ONCE AS NEEDED
Status: CANCELLED | OUTPATIENT
Start: 2025-01-17

## 2025-01-16 RX ORDER — SODIUM CHLORIDE 0.9 % (FLUSH) 0.9 %
10 SYRINGE (ML) INJECTION
Status: CANCELLED | OUTPATIENT
Start: 2025-01-17

## 2025-01-16 RX ORDER — DIPHENHYDRAMINE HYDROCHLORIDE 50 MG/ML
50 INJECTION INTRAMUSCULAR; INTRAVENOUS ONCE AS NEEDED
Status: CANCELLED | OUTPATIENT
Start: 2025-01-17

## 2025-01-17 ENCOUNTER — INFUSION (OUTPATIENT)
Dept: INFUSION THERAPY | Facility: HOSPITAL | Age: 75
End: 2025-01-17
Attending: INTERNAL MEDICINE
Payer: MEDICARE

## 2025-01-17 ENCOUNTER — TELEPHONE (OUTPATIENT)
Facility: CLINIC | Age: 75
End: 2025-01-17
Payer: MEDICARE

## 2025-01-17 VITALS
TEMPERATURE: 97 F | OXYGEN SATURATION: 100 % | SYSTOLIC BLOOD PRESSURE: 125 MMHG | HEIGHT: 64 IN | HEART RATE: 75 BPM | WEIGHT: 141.81 LBS | BODY MASS INDEX: 24.21 KG/M2 | RESPIRATION RATE: 18 BRPM | DIASTOLIC BLOOD PRESSURE: 74 MMHG

## 2025-01-17 DIAGNOSIS — D70.1 CHEMOTHERAPY INDUCED NEUTROPENIA: Primary | ICD-10-CM

## 2025-01-17 DIAGNOSIS — C50.911 INVASIVE DUCTAL CARCINOMA OF BREAST, FEMALE, RIGHT: ICD-10-CM

## 2025-01-17 DIAGNOSIS — T45.1X5A CHEMOTHERAPY INDUCED NEUTROPENIA: Primary | ICD-10-CM

## 2025-01-17 PROCEDURE — 25000003 PHARM REV CODE 250: Performed by: NURSE PRACTITIONER

## 2025-01-17 PROCEDURE — 96413 CHEMO IV INFUSION 1 HR: CPT

## 2025-01-17 PROCEDURE — 63600175 PHARM REV CODE 636 W HCPCS: Performed by: NURSE PRACTITIONER

## 2025-01-17 PROCEDURE — A4216 STERILE WATER/SALINE, 10 ML: HCPCS | Performed by: NURSE PRACTITIONER

## 2025-01-17 RX ORDER — SODIUM CHLORIDE 0.9 % (FLUSH) 0.9 %
10 SYRINGE (ML) INJECTION
Status: DISCONTINUED | OUTPATIENT
Start: 2025-01-17 | End: 2025-01-17 | Stop reason: HOSPADM

## 2025-01-17 RX ORDER — HEPARIN 100 UNIT/ML
500 SYRINGE INTRAVENOUS
Status: DISCONTINUED | OUTPATIENT
Start: 2025-01-17 | End: 2025-01-17 | Stop reason: HOSPADM

## 2025-01-17 RX ORDER — EPINEPHRINE 0.3 MG/.3ML
0.3 INJECTION SUBCUTANEOUS ONCE AS NEEDED
Status: DISCONTINUED | OUTPATIENT
Start: 2025-01-17 | End: 2025-01-17 | Stop reason: HOSPADM

## 2025-01-17 RX ORDER — DIPHENHYDRAMINE HYDROCHLORIDE 50 MG/ML
50 INJECTION INTRAMUSCULAR; INTRAVENOUS ONCE AS NEEDED
Status: DISCONTINUED | OUTPATIENT
Start: 2025-01-17 | End: 2025-01-17 | Stop reason: HOSPADM

## 2025-01-17 RX ADMIN — TRASTUZUMAB-ANNS 373 MG: 420 INJECTION, POWDER, LYOPHILIZED, FOR SOLUTION INTRAVENOUS at 08:01

## 2025-01-17 RX ADMIN — HEPARIN 500 UNITS: 100 SYRINGE at 09:01

## 2025-01-17 RX ADMIN — Medication 10 ML: at 09:01

## 2025-01-17 NOTE — PLAN OF CARE
Problem: Fall Injury Risk  Goal: Absence of Fall and Fall-Related Injury  Outcome: Progressing  Intervention: Identify and Manage Contributors  Flowsheets (Taken 1/17/2025 0809)  Self-Care Promotion: independence encouraged  Medication Review/Management: medications reviewed  Intervention: Promote Injury-Free Environment  Flowsheets (Taken 1/17/2025 0809)  Safety Promotion/Fall Prevention: medications reviewed

## 2025-02-04 ENCOUNTER — HOSPITAL ENCOUNTER (OUTPATIENT)
Dept: CARDIOLOGY | Facility: HOSPITAL | Age: 75
Discharge: HOME | End: 2025-02-04
Attending: INTERNAL MEDICINE
Payer: MEDICARE

## 2025-02-04 VITALS — WEIGHT: 141 LBS | BODY MASS INDEX: 24.07 KG/M2 | HEIGHT: 64 IN

## 2025-02-04 DIAGNOSIS — D50.8 IRON DEFICIENCY ANEMIA SECONDARY TO INADEQUATE DIETARY IRON INTAKE: ICD-10-CM

## 2025-02-04 DIAGNOSIS — C50.911 INVASIVE DUCTAL CARCINOMA OF BREAST, FEMALE, RIGHT: ICD-10-CM

## 2025-02-04 PROCEDURE — 93306 TTE W/DOPPLER COMPLETE: CPT | Mod: 26,,, | Performed by: INTERNAL MEDICINE

## 2025-02-04 PROCEDURE — 93306 TTE W/DOPPLER COMPLETE: CPT

## 2025-02-04 RX ORDER — EPINEPHRINE 0.3 MG/.3ML
0.3 INJECTION SUBCUTANEOUS ONCE AS NEEDED
Status: CANCELLED | OUTPATIENT
Start: 2025-02-06

## 2025-02-04 RX ORDER — SODIUM CHLORIDE 0.9 % (FLUSH) 0.9 %
10 SYRINGE (ML) INJECTION
Status: CANCELLED | OUTPATIENT
Start: 2025-02-06

## 2025-02-04 RX ORDER — HEPARIN 100 UNIT/ML
500 SYRINGE INTRAVENOUS
Status: CANCELLED | OUTPATIENT
Start: 2025-02-06

## 2025-02-04 RX ORDER — DIPHENHYDRAMINE HYDROCHLORIDE 50 MG/ML
50 INJECTION INTRAMUSCULAR; INTRAVENOUS ONCE AS NEEDED
Status: CANCELLED | OUTPATIENT
Start: 2025-02-06

## 2025-02-06 LAB
AORTIC ROOT ANNULUS: 2.9 CM
AORTIC VALVE CUSP SEPERATION: 2.7 CM
APICAL FOUR CHAMBER EJECTION FRACTION: 43 %
APICAL TWO CHAMBER EJECTION FRACTION: 62 %
AV INDEX (PROSTH): 0.76
AV MEAN GRADIENT: 4 MMHG
AV PEAK GRADIENT: 8 MMHG
AV VALVE AREA BY VELOCITY RATIO: 2.5 CM²
AV VALVE AREA: 2.6 CM²
AV VELOCITY RATIO: 0.71
BSA FOR ECHO PROCEDURE: 1.7 M2
CV ECHO LV RWT: 0.49 CM
DOP CALC AO PEAK VEL: 1.4 M/S
DOP CALC AO VTI: 26.8 CM
DOP CALC LVOT AREA: 3.5 CM2
DOP CALC LVOT DIAMETER: 2.1 CM
DOP CALC LVOT PEAK VEL: 1 M/S
DOP CALC LVOT STROKE VOLUME: 70.3 CM3
DOP CALCLVOT PEAK VEL VTI: 20.3 CM
E WAVE DECELERATION TIME: 190 MSEC
E/A RATIO: 0.47
E/E' RATIO: 6 M/S
ECHO LV POSTERIOR WALL: 1 CM (ref 0.6–1.1)
FRACTIONAL SHORTENING: 19.5 % (ref 28–44)
INTERVENTRICULAR SEPTUM: 0.9 CM (ref 0.6–1.1)
IVRT: 134 MSEC
LEFT ATRIUM AREA SYSTOLIC (APICAL 4 CHAMBER): 14.5 CM2
LEFT INTERNAL DIMENSION IN SYSTOLE: 3.3 CM (ref 2.1–4)
LEFT VENTRICLE DIASTOLIC VOLUME INDEX: 43.91 ML/M2
LEFT VENTRICLE DIASTOLIC VOLUME: 74.2 ML
LEFT VENTRICLE END DIASTOLIC VOLUME APICAL 2 CHAMBER: 40 ML
LEFT VENTRICLE END DIASTOLIC VOLUME APICAL 4 CHAMBER: 28.2 ML
LEFT VENTRICLE END SYSTOLIC VOLUME APICAL 4 CHAMBER: 39 ML
LEFT VENTRICLE MASS INDEX: 72.8 G/M2
LEFT VENTRICLE SYSTOLIC VOLUME INDEX: 26.1 ML/M2
LEFT VENTRICLE SYSTOLIC VOLUME: 44.1 ML
LEFT VENTRICULAR INTERNAL DIMENSION IN DIASTOLE: 4.1 CM (ref 3.5–6)
LEFT VENTRICULAR MASS: 123 G
LV LATERAL E/E' RATIO: 5.8 M/S
LV SEPTAL E/E' RATIO: 6.6 M/S
LVED V (TEICH): 74.2 ML
LVES V (TEICH): 44.1 ML
LVOT MG: 2 MMHG
LVOT MV: 0.7 CM/S
MV PEAK A VEL: 0.98 M/S
MV PEAK E VEL: 0.46 M/S
MV STENOSIS PRESSURE HALF TIME: 44 MS
MV VALVE AREA P 1/2 METHOD: 5 CM2
OHS CV RV/LV RATIO: 0.44 CM
OHS LV EJECTION FRACTION SIMPSONS BIPLANE MOD: 54 %
PISA TR MAX VEL: 2.6 M/S
RA PRESSURE ESTIMATED: 3 MMHG
RIGHT VENTRICLE DIASTOLIC BASEL DIMENSION: 1.8 CM
RIGHT VENTRICULAR END-DIASTOLIC DIMENSION: 1.8 CM
RV TB RVSP: 6 MMHG
TDI LATERAL: 0.08 M/S
TDI SEPTAL: 0.07 M/S
TDI: 0.08 M/S
TR MAX PG: 27 MMHG
TV REST PULMONARY ARTERY PRESSURE: 30 MMHG
Z-SCORE OF LEFT VENTRICULAR DIMENSION IN END DIASTOLE: -1.38
Z-SCORE OF LEFT VENTRICULAR DIMENSION IN END SYSTOLE: 0.98

## 2025-02-07 ENCOUNTER — HOSPITAL ENCOUNTER (OUTPATIENT)
Dept: RADIOLOGY | Facility: HOSPITAL | Age: 75
Discharge: HOME | End: 2025-02-07
Attending: SURGERY
Payer: MEDICARE

## 2025-02-07 ENCOUNTER — INFUSION (OUTPATIENT)
Dept: INFUSION THERAPY | Facility: HOSPITAL | Age: 75
End: 2025-02-07
Attending: INTERNAL MEDICINE
Payer: MEDICARE

## 2025-02-07 VITALS
TEMPERATURE: 97 F | BODY MASS INDEX: 23.73 KG/M2 | HEART RATE: 78 BPM | DIASTOLIC BLOOD PRESSURE: 75 MMHG | WEIGHT: 139 LBS | SYSTOLIC BLOOD PRESSURE: 121 MMHG | HEIGHT: 64 IN | RESPIRATION RATE: 16 BRPM | OXYGEN SATURATION: 98 %

## 2025-02-07 DIAGNOSIS — D70.1 CHEMOTHERAPY INDUCED NEUTROPENIA: Primary | ICD-10-CM

## 2025-02-07 DIAGNOSIS — Z85.3 HISTORY OF RIGHT BREAST CANCER: ICD-10-CM

## 2025-02-07 DIAGNOSIS — C50.911 MALIGNANT NEOPLASM OF RIGHT FEMALE BREAST, UNSPECIFIED ESTROGEN RECEPTOR STATUS, UNSPECIFIED SITE OF BREAST: ICD-10-CM

## 2025-02-07 DIAGNOSIS — C50.911 INVASIVE DUCTAL CARCINOMA OF BREAST, FEMALE, RIGHT: ICD-10-CM

## 2025-02-07 DIAGNOSIS — T45.1X5A CHEMOTHERAPY INDUCED NEUTROPENIA: Primary | ICD-10-CM

## 2025-02-07 PROCEDURE — A4216 STERILE WATER/SALINE, 10 ML: HCPCS | Performed by: INTERNAL MEDICINE

## 2025-02-07 PROCEDURE — 77062 BREAST TOMOSYNTHESIS BI: CPT | Mod: TC,PO

## 2025-02-07 PROCEDURE — 76642 ULTRASOUND BREAST LIMITED: CPT | Mod: TC,PO,RT

## 2025-02-07 PROCEDURE — 96413 CHEMO IV INFUSION 1 HR: CPT

## 2025-02-07 PROCEDURE — 76642 ULTRASOUND BREAST LIMITED: CPT | Mod: 26,RT,, | Performed by: RADIOLOGY

## 2025-02-07 PROCEDURE — 25000003 PHARM REV CODE 250: Performed by: INTERNAL MEDICINE

## 2025-02-07 PROCEDURE — 77066 DX MAMMO INCL CAD BI: CPT | Mod: 26,,, | Performed by: RADIOLOGY

## 2025-02-07 PROCEDURE — 63600175 PHARM REV CODE 636 W HCPCS: Performed by: INTERNAL MEDICINE

## 2025-02-07 PROCEDURE — 77062 BREAST TOMOSYNTHESIS BI: CPT | Mod: 26,,, | Performed by: RADIOLOGY

## 2025-02-07 RX ORDER — HEPARIN 100 UNIT/ML
500 SYRINGE INTRAVENOUS
Status: DISCONTINUED | OUTPATIENT
Start: 2025-02-07 | End: 2025-02-07 | Stop reason: HOSPADM

## 2025-02-07 RX ORDER — SODIUM CHLORIDE 0.9 % (FLUSH) 0.9 %
10 SYRINGE (ML) INJECTION
Status: DISCONTINUED | OUTPATIENT
Start: 2025-02-07 | End: 2025-02-07 | Stop reason: HOSPADM

## 2025-02-07 RX ORDER — EPINEPHRINE 0.3 MG/.3ML
0.3 INJECTION SUBCUTANEOUS ONCE AS NEEDED
Status: DISCONTINUED | OUTPATIENT
Start: 2025-02-07 | End: 2025-02-07 | Stop reason: HOSPADM

## 2025-02-07 RX ORDER — DIPHENHYDRAMINE HYDROCHLORIDE 50 MG/ML
50 INJECTION INTRAMUSCULAR; INTRAVENOUS ONCE AS NEEDED
Status: DISCONTINUED | OUTPATIENT
Start: 2025-02-07 | End: 2025-02-07 | Stop reason: HOSPADM

## 2025-02-07 RX ADMIN — TRASTUZUMAB-ANNS 373 MG: 420 INJECTION, POWDER, LYOPHILIZED, FOR SOLUTION INTRAVENOUS at 01:02

## 2025-02-07 RX ADMIN — SODIUM CHLORIDE, PRESERVATIVE FREE 10 ML: 5 INJECTION INTRAVENOUS at 01:02

## 2025-02-07 RX ADMIN — HEPARIN 500 UNITS: 100 SYRINGE at 01:02

## 2025-02-07 NOTE — PLAN OF CARE
Problem: Fatigue  Goal: Improved Activity Tolerance  Intervention: Promote Improved Energy  Flowsheets (Taken 2/7/2025 1245)  Fatigue Management: fatigue-related activity identified  Activity Management: Ambulated -L4

## 2025-02-10 ENCOUNTER — TELEPHONE (OUTPATIENT)
Facility: CLINIC | Age: 75
End: 2025-02-10

## 2025-02-10 ENCOUNTER — OFFICE VISIT (OUTPATIENT)
Facility: CLINIC | Age: 75
End: 2025-02-10
Payer: MEDICARE

## 2025-02-10 VITALS
HEIGHT: 64 IN | DIASTOLIC BLOOD PRESSURE: 76 MMHG | SYSTOLIC BLOOD PRESSURE: 118 MMHG | TEMPERATURE: 98 F | HEART RATE: 83 BPM | RESPIRATION RATE: 16 BRPM | BODY MASS INDEX: 24.27 KG/M2 | OXYGEN SATURATION: 98 % | WEIGHT: 142.13 LBS

## 2025-02-10 DIAGNOSIS — C50.911 INVASIVE DUCTAL CARCINOMA OF BREAST, FEMALE, RIGHT: Primary | ICD-10-CM

## 2025-02-10 DIAGNOSIS — R94.30 EJECTION FRACTION < 50%: ICD-10-CM

## 2025-02-10 DIAGNOSIS — D50.9 IRON DEFICIENCY ANEMIA, UNSPECIFIED IRON DEFICIENCY ANEMIA TYPE: ICD-10-CM

## 2025-02-10 DIAGNOSIS — R92.8 OTHER ABNORMAL AND INCONCLUSIVE FINDINGS ON DIAGNOSTIC IMAGING OF BREAST: ICD-10-CM

## 2025-02-10 PROCEDURE — 1126F AMNT PAIN NOTED NONE PRSNT: CPT | Mod: CPTII,S$GLB,, | Performed by: NURSE PRACTITIONER

## 2025-02-10 PROCEDURE — 99999 PR PBB SHADOW E&M-EST. PATIENT-LVL IV: CPT | Mod: PBBFAC,,, | Performed by: NURSE PRACTITIONER

## 2025-02-10 PROCEDURE — 3288F FALL RISK ASSESSMENT DOCD: CPT | Mod: CPTII,S$GLB,, | Performed by: NURSE PRACTITIONER

## 2025-02-10 PROCEDURE — 1159F MED LIST DOCD IN RCRD: CPT | Mod: CPTII,S$GLB,, | Performed by: NURSE PRACTITIONER

## 2025-02-10 PROCEDURE — 1101F PT FALLS ASSESS-DOCD LE1/YR: CPT | Mod: CPTII,S$GLB,, | Performed by: NURSE PRACTITIONER

## 2025-02-10 PROCEDURE — 3074F SYST BP LT 130 MM HG: CPT | Mod: CPTII,S$GLB,, | Performed by: NURSE PRACTITIONER

## 2025-02-10 PROCEDURE — G2211 COMPLEX E/M VISIT ADD ON: HCPCS | Mod: S$GLB,,, | Performed by: NURSE PRACTITIONER

## 2025-02-10 PROCEDURE — 3008F BODY MASS INDEX DOCD: CPT | Mod: CPTII,S$GLB,, | Performed by: NURSE PRACTITIONER

## 2025-02-10 PROCEDURE — 99215 OFFICE O/P EST HI 40 MIN: CPT | Mod: S$GLB,,, | Performed by: NURSE PRACTITIONER

## 2025-02-10 PROCEDURE — 3078F DIAST BP <80 MM HG: CPT | Mod: CPTII,S$GLB,, | Performed by: NURSE PRACTITIONER

## 2025-02-10 RX ORDER — HEPARIN 100 UNIT/ML
500 SYRINGE INTRAVENOUS
OUTPATIENT
Start: 2025-03-10

## 2025-02-10 RX ORDER — SODIUM CHLORIDE 0.9 % (FLUSH) 0.9 %
10 SYRINGE (ML) INJECTION
OUTPATIENT
Start: 2025-03-10

## 2025-02-10 NOTE — PROGRESS NOTES
[facility] HEMATOLGY ONCOLOGY CONSULTATION    Subjective:       Patient ID: Dora Atwood is a 74 y.o. female returning today for a regularly scheduled follow-up visit.    Chief Complaint: Breast Cancer  HER 2 +       HPI  The patient is here today by herself.     She had previously had a lumpectomy, XRT, and TCHP x 4 cycles. She is on herceptin post XRT. She has had 5 herceptin infusions alone post XRT.  She started to have some chest tightness and cough and Dr. Brooks had ordered a work up. She is seeing cardiology and pulmonary.     Post mammogram and US - stable from 2/7/2025 and will repeat right breast Mammo in 6 months   She is now establishing with  due to Dr. Treviño retiring.     She was having some chest tightness and has been seen by Dr. Brooks and Dr. Meza and Dr. Tafoya with Cardiology.  She sees Dr. Frye at the end of the month.     CT scan from 1/20 shows pleural thickening, pt is not having any fever.       ECHO from 2/4/2025 does show a drop in EF from 65% to 40-45%. She is getting a stress test with Dr. Tafoya on 2/17/2025      Past Medical History:   Diagnosis Date    Breast cancer 01/01/2024    Incisional hernia     from gallbladder surgery       Past Surgical History:   Procedure Laterality Date    BREAST LUMPECTOMY Right     CATARACT EXTRACTION Bilateral 01/2024    CHOLECYSTECTOMY  2022    INJECTION FOR SENTINEL NODE IDENTIFICATION Right 07/30/2024    Procedure: INJECTION, FOR SENTINEL NODE IDENTIFICATION;  Surgeon: Walseka Treviño MD;  Location: [facility] OR;  Service: General;  Laterality: Right;    INSERTION OF TUNNELED CENTRAL VENOUS CATHETER (CVC) WITH SUBCUTANEOUS PORT Right 03/19/2024    Procedure: INSERTION, PORT-A-CATH;  Surgeon: Waleska Treviño MD;  Location: [facility] OR;  Service: General;  Laterality: Right;    MASTECTOMY, PARTIAL Right 07/30/2024    Procedure: MASTECTOMY, PARTIAL;  Surgeon: Waleska Treviño MD;  Location: [facility] OR;  Service: General;  Laterality: Right;  SYDNIE   (7/25)    TUBAL LIGATION  1981       Social History     Socioeconomic History    Marital status:    Tobacco Use    Smoking status: Never    Smokeless tobacco: Never   Substance and Sexual Activity    Alcohol use: Never    Drug use: Never     Social Drivers of Health     Financial Resource Strain: Low Risk  (3/21/2024)    Overall Financial Resource Strain (CARDIA)     Difficulty of Paying Living Expenses: Not hard at all   Food Insecurity: No Food Insecurity (3/21/2024)    Hunger Vital Sign     Worried About Running Out of Food in the Last Year: Never true     Ran Out of Food in the Last Year: Never true   Transportation Needs: No Transportation Needs (3/21/2024)    PRAPARE - Transportation     Lack of Transportation (Medical): No     Lack of Transportation (Non-Medical): No   Physical Activity: Sufficiently Active (3/21/2024)    Exercise Vital Sign     Days of Exercise per Week: 6 days     Minutes of Exercise per Session: 30 min   Stress: Stress Concern Present (3/21/2024)    [employer] - Occupational Stress Questionnaire     Feeling of Stress : To some extent   Housing Stability: Low Risk  (3/21/2024)    Housing Stability Vital Sign     Unable to Pay for Housing in the Last Year: No     Number of Places Lived in the Last Year: 1     Unstable Housing in the Last Year: No       Family History   Problem Relation Name Age of Onset    Rectal cancer Mother      Breast cancer Maternal Aunt         Review of patient's allergies indicates:   Allergen Reactions    Carboplatin Other (See Comments)     Chest tightness and flushing       Current Outpatient Medications:     diphenoxylate-atropine 2.5-0.025 mg (LOMOTIL) 2.5-0.025 mg per tablet, Take 1 tablet by mouth 4 (four) times daily as needed for Diarrhea., Disp: 30 tablet, Rfl: 1    duke's soln (benadryl 30 mL, mylanta 30 mL, LIDOcaine 30 mL, nystatin 30 mL) 120mL, Take 10 mLs by mouth 4 (four) times daily. (Patient not taking: Reported  "on 2/10/2025), Disp: 240 mL, Rfl: 1    HYDROcodone-acetaminophen (NORCO) 5-325 mg per tablet, Take 1 tablet by mouth every 8 (eight) hours as needed for Pain. (Patient not taking: Reported on 2/10/2025), Disp: 15 tablet, Rfl: 0    ondansetron (ZOFRAN) 8 MG tablet, Take 1 tablet (8 mg total) by mouth every 8 (eight) hours as needed for Nausea. (Patient not taking: Reported on 2/10/2025), Disp: 30 tablet, Rfl: 2    promethazine (PHENERGAN) 25 MG tablet, Take 1 tablet (25 mg total) by mouth every 4 to 6 hours as needed for Nausea. (Patient not taking: Reported on 2/10/2025), Disp: 30 tablet, Rfl: 2    All medications and past history have been reviewed.    Review of Systems   Constitutional:  Negative for appetite change and unexpected weight change.   HENT:  Negative for mouth sores.    Eyes:  Negative for visual disturbance.   Respiratory:  Positive for cough and shortness of breath.    Cardiovascular:  Positive for chest pain.   Gastrointestinal:  Negative for abdominal pain and diarrhea.   Genitourinary:  Negative for frequency.   Musculoskeletal:  Positive for back pain.   Skin:  Negative for rash.   Neurological:  Negative for headaches.   Hematological:  Negative for adenopathy.   Psychiatric/Behavioral:  The patient is not nervous/anxious.        Objective:        /76   Pulse 83   Temp 97.5 °F (36.4 °C) (Temporal)   Resp 16   Ht 5' 4" (1.626 m)   Wt 64.5 kg (142 lb 1.6 oz)   SpO2 98%   BMI 24.39 kg/m²     Physical Exam  Constitutional:       Appearance: Normal appearance.   HENT:      Head: Normocephalic and atraumatic.      Mouth/Throat:      Mouth: Mucous membranes are moist.   Cardiovascular:      Rate and Rhythm: Normal rate and regular rhythm.      Pulses: Normal pulses.      Heart sounds: Normal heart sounds.   Pulmonary:      Effort: Pulmonary effort is normal. No respiratory distress.      Breath sounds: Normal breath sounds. No wheezing.   Abdominal:      General: There is no distension. "      Palpations: Abdomen is soft. There is no mass.      Tenderness: There is no abdominal tenderness.   Musculoskeletal:         General: No swelling. Normal range of motion.      Right lower leg: No edema.      Left lower leg: No edema.   Skin:     General: Skin is warm and dry.      Capillary Refill: Capillary refill takes 2 to 3 seconds.      Findings: No bruising or rash.   Neurological:      Mental Status: She is alert and oriented to person, place, and time. Mental status is at baseline.      Motor: No weakness.   Psychiatric:         Mood and Affect: Mood normal.         Behavior: Behavior normal.           Lab:    Contains abnormal data CBC W/ AUTO DIFFERENTIAL  Order: [id number]   Ref Range & Units 3 d ago   WBC 4.8 - 10.8 iglesia/L 4.3 Low    RBC 4.50 - 5.50 tril/L 3.98 Low    Hemoglobin 12.0 - 15.0 g/dL 13   Hematocrit 37.0 - 47.0 % 37   MCV 81 - 97 fL 93   MCH 27 - 32 pg 33 High    MCHC 32 - 36 g/dL 35   RDW 11.5 - 14.5 % 14.3   Platelets 150 - 400 iglesia/L 308   MPV 7.4 - 10.4 fL 6.7 Low    Granulocytes % 42.2 - 75.2 % 75.3 High    Lymphocytes % 20.5 - 51.1 % 9.1 Low    Monocytes % 1.7 - 9.3 % 12.5 High    Eosinophils % 0.0 - <5.0 % 2.3   Basophils % 0.0 - <5.0 % 0.8   Granulocytes Absolute 1.4 - 6.5 K/UL 3.2   Lymphocytes, Absolute 1.2 - 3.4 K/uL 0.4 Low    Monocyte Absolute 0.11 - 0.59 K/uL 0.5   Eosinophils, Absolute 0.00 - 0.70 K/UL 0.1   Basophils, Absolute 0.00 - 0.20 K/UL 0   ANC 1400 - 6500 /uL 3,200   nRBC% 0 - 0 /100 0   Resulting Agency  [facility]      Contains abnormal data COMPREHENSIVE METABOLIC PANEL  Order: [id number]   Ref Range & Units 3 d ago   Sodium 136 - 145 mmol/L 139   Potassium 3.4 - 4.5 mmol/L 4   Chloride 98 - 107 mmol/L 103   CO2 21 - 31 mmol/L 30   BUN 7 - 25 mg/dL 11   Creatinine 0.60 - 1.30 mg/dL 0.57 Low    Glucose 74 - 109 mg/dL 88   Calcium 8.6 - 10.3 mg/dL 9.5   Albumin 3.5 - 5.2 g/dL 4   AST 13 - 39 U/L 17   ALT 7 - 52 U/L 14   Alkaline Phosphatase 34 - 104 U/L 126 High     Total Bilirubin 0.3 - 1.0 mg/dL 0.5   Protein Total 6.4 - 8.9 g/dL 6.9   Anion Gap 3 - 15 mmol/L 6   Albumin/Globulin Ratio 1.0 - 2.0 1.4   Globulin 2.0 - 4.0 g/dL 2.9   Osmolality Calc 275 - 295 mOsmol/kg 276   eGFR >=60 mL/min 96       Radiology/Diagnostic Studies:    ECHO 2/4/2025:     Left Ventricle: The left ventricle is normal in size. Normal wall thickness. Mild global hypokinesis present. There is mildly reduced systolic function with a visually estimated ejection fraction of 40 - 45%. There is normal diastolic function.    Right Ventricle: Systolic function is normal.    Right Atrium: Right atrium is mildly dilated.    Tricuspid Valve: There is mild regurgitation.    Pulmonary Artery: The estimated pulmonary artery systolic pressure is 30 mmHg.    IVC/SVC: Normal venous pressure at 3 mmHg.      Mammogram and US 2/7/2025  Impression:     Stable postsurgical changes in the upper outer right breast with removal of the suspicious calcifications.  There is mild skin thickening on the right compatible with postradiation changes     No mammographic abnormality in the left breast     4 mm benign-appearing lymph node in the right axilla with no additional adenopathy     Recommendation: Follow-up right mammogram in 6 months is recommended.     BI-RADS CATEGORY: 3  PROBABLY BENIGN FINDING - SHORT TERM INTERVAL FOLLOWUP SUGGESTED.     Technologist: MELISSA        Electronically signed by:Dianna Pires  Date:                                            02/07/2025  Time:                                           12:01    All lab results and imaging results have been reviewed and discussed with the patient.   Assessment/Plan:       1. Invasive ductal carcinoma of breast, female, right    2. Other abnormal and inconclusive findings on diagnostic imaging of breast    3. Ejection fraction < 50%    4. Iron deficiency anemia, unspecified iron deficiency anemia type        Invasive ductal carcinoma of breast, female, right  -      Mammo Digital Diagnostic Right with Reji; Future; Expected date: 08/10/2025  -     US Breast Right Complete; Future; Expected date: 08/10/2025    Other abnormal and inconclusive findings on diagnostic imaging of breast  -     Mammo Digital Diagnostic Right with Reji; Future; Expected date: 08/10/2025    Ejection fraction < 50%  -     B-TYPE NATRIURETIC PEPTIDE; Future; Expected date: 02/10/2025    Iron deficiency anemia, unspecified iron deficiency anemia type          PLAN:   Hold herceptin due to drop in EF   Continue cardiac work up with Dr. Camacho team   Labs stable   Following up with Dr. Guthrie due to Dr. Treviño retiring   Mammogram and US reviewed repeat right breast mammogram in 6 months.   Seeing pulm at the end of  this month due to abnormal CT scan of the chest that showed some pleural thickening.  Continue IV iron at [place] for now   Adding BNP to labs due to drop in ejection fraction   Turn in stool studies today      Cancer Staging   Invasive ductal carcinoma of breast, female, right  Staging form: Breast, AJCC 8th Edition  - Clinical: Stage IIIB (cT1b, cN3c, cM0, G2, ER-, WI-, HER2+) - Signed by Meño Villanueva Jr., MD on 7/1/2024  - Pathologic: ypT0, pN0(sn), cM0 - Signed by Meño Villanueva Jr., MD on 11/12/2024        I have explained and the patient understands all of  the current recommendation(s). I have answered all of their questions to the best of my ability and to their complete satisfaction.   The patient is to continue with the current management plan.            Electronically signed by: Isabell Gayle, MSN, APRN, AGNP-C

## 2025-02-12 ENCOUNTER — TELEPHONE (OUTPATIENT)
Facility: CLINIC | Age: 75
End: 2025-02-12
Payer: MEDICARE

## 2025-02-12 ENCOUNTER — LAB VISIT (OUTPATIENT)
Dept: LAB | Facility: HOSPITAL | Age: 75
End: 2025-02-12
Attending: INTERNAL MEDICINE
Payer: MEDICARE

## 2025-02-12 DIAGNOSIS — D50.0 IRON DEFICIENCY ANEMIA DUE TO CHRONIC BLOOD LOSS: ICD-10-CM

## 2025-02-12 DIAGNOSIS — D50.8 IRON DEFICIENCY ANEMIA SECONDARY TO INADEQUATE DIETARY IRON INTAKE: Primary | ICD-10-CM

## 2025-02-12 DIAGNOSIS — D50.8 IRON DEFICIENCY ANEMIA SECONDARY TO INADEQUATE DIETARY IRON INTAKE: ICD-10-CM

## 2025-02-12 LAB
OB PNL STL: NEGATIVE
OB PNL STL: POSITIVE
OB PNL STL: POSITIVE

## 2025-02-12 PROCEDURE — 82272 OCCULT BLD FECES 1-3 TESTS: CPT | Mod: 91 | Performed by: INTERNAL MEDICINE

## 2025-02-26 ENCOUNTER — OFFICE VISIT (OUTPATIENT)
Dept: PULMONOLOGY | Facility: CLINIC | Age: 75
End: 2025-02-26
Payer: MEDICARE

## 2025-02-26 VITALS
HEART RATE: 79 BPM | WEIGHT: 141 LBS | OXYGEN SATURATION: 97 % | DIASTOLIC BLOOD PRESSURE: 70 MMHG | BODY MASS INDEX: 24.07 KG/M2 | SYSTOLIC BLOOD PRESSURE: 125 MMHG | HEIGHT: 64 IN

## 2025-02-26 DIAGNOSIS — R91.8 ABNORMAL CT LUNG SCREENING: Primary | ICD-10-CM

## 2025-02-26 DIAGNOSIS — J92.9 PLEURAL THICKENING: ICD-10-CM

## 2025-02-26 DIAGNOSIS — C50.911 MALIGNANT NEOPLASM OF RIGHT FEMALE BREAST, UNSPECIFIED ESTROGEN RECEPTOR STATUS, UNSPECIFIED SITE OF BREAST: ICD-10-CM

## 2025-02-26 DIAGNOSIS — J70.1 RADIATION FIBROSIS OF LUNG: ICD-10-CM

## 2025-02-26 PROCEDURE — 3074F SYST BP LT 130 MM HG: CPT | Mod: CPTII,S$GLB,, | Performed by: INTERNAL MEDICINE

## 2025-02-26 PROCEDURE — 99204 OFFICE O/P NEW MOD 45 MIN: CPT | Mod: S$GLB,,, | Performed by: INTERNAL MEDICINE

## 2025-02-26 PROCEDURE — 3008F BODY MASS INDEX DOCD: CPT | Mod: CPTII,S$GLB,, | Performed by: INTERNAL MEDICINE

## 2025-02-26 PROCEDURE — 1126F AMNT PAIN NOTED NONE PRSNT: CPT | Mod: CPTII,S$GLB,, | Performed by: INTERNAL MEDICINE

## 2025-02-26 PROCEDURE — 3078F DIAST BP <80 MM HG: CPT | Mod: CPTII,S$GLB,, | Performed by: INTERNAL MEDICINE

## 2025-02-26 PROCEDURE — 1159F MED LIST DOCD IN RCRD: CPT | Mod: CPTII,S$GLB,, | Performed by: INTERNAL MEDICINE

## 2025-02-26 PROCEDURE — 99999 PR PBB SHADOW E&M-EST. PATIENT-LVL III: CPT | Mod: PBBFAC,,, | Performed by: INTERNAL MEDICINE

## 2025-02-26 NOTE — PROGRESS NOTES
SUBJECTIVE:    Patient ID: Dora Atwood is a 74 y.o. female.    Chief Complaint: Establish Care and Abnormal Ct Scan    HPI The patient is here after developing some chest heaviness following radiation.  She was found to have pleural thickening and extensive parenchymal changes to her R thorax.  She completed radiation to the R breast in November.  She has had a cardiac workup which was negative. She is now beginning to feel better.  She has a mild dry single cough.  I do not have the CT in front of me.  Just the report.  Explained that although it sounds fairly extensive from the report, this is likely related to her radiation and should improve over time.  She is not producing any sputum.  Her cough is improved.  Her chest heaviness has improved.  Past Medical History:   Diagnosis Date    Breast cancer 01/01/2024    Incisional hernia     from gallbladder surgery     Past Surgical History:   Procedure Laterality Date    BREAST LUMPECTOMY Right     CATARACT EXTRACTION Bilateral 01/2024    CHOLECYSTECTOMY  2022    INJECTION FOR SENTINEL NODE IDENTIFICATION Right 07/30/2024    Procedure: INJECTION, FOR SENTINEL NODE IDENTIFICATION;  Surgeon: Waleska Treviño MD;  Location: [facility];  Service: General;  Laterality: Right;    INSERTION OF TUNNELED CENTRAL VENOUS CATHETER (CVC) WITH SUBCUTANEOUS PORT Right 03/19/2024    Procedure: INSERTION, PORT-A-CATH;  Surgeon: Waleska Treviño MD;  Location: [facility] OR;  Service: General;  Laterality: Right;    MASTECTOMY, PARTIAL Right 07/30/2024    Procedure: MASTECTOMY, PARTIAL;  Surgeon: Waleska Treviño MD;  Location: [facility] OR;  Service: General;  Laterality: Right;  SYDNIE FITZGERALD (7/25)    TUBAL LIGATION  1981     Family History   Problem Relation Name Age of Onset    Rectal cancer Mother      Breast cancer Maternal Aunt          Social History:   Marital Status:   Occupation: teacher  Alcohol History:  reports no history of alcohol use.  Tobacco History:  reports that she has  "never smoked. She has never used smokeless tobacco.  Drug History:  reports no history of drug use.    Review of patient's allergies indicates:   Allergen Reactions    Carboplatin Other (See Comments)     Chest tightness and flushing       Current Medications[1]    Alpha-1 Antitrypsin:  Last PFT:   Last CT:    Review of Systems  General: Feeling good.  Eyes: Vision is good.  ENT:  tinnitus  Heart:: No chest pain or palpitations.  Lungs: a little cough and improving chest heaviness  GI: No Nausea, vomiting, constipation, diarrhea, or reflux.  : Nocturia 2-3  Musculoskeletal: states she is old  Skin: No lesions or rashes.  Neuro: No headaches or neuropathy.  Lymph: No edema or adenopathy.  Psych: No anxiety or depression.  Endo: No weight change.    OBJECTIVE:      /70 (BP Location: Left arm, Patient Position: Sitting)   Pulse 79   Ht 5' 4" (1.626 m)   Wt 64 kg (141 lb)   SpO2 97%   BMI 24.20 kg/m²     Physical Exam  GENERAL: Older patient in no distress.  HEENT: Pupils equal and reactive. Extraocular movements intact. Nose intact.      Pharynx moist.  NECK: Supple.   HEART: Regular rate and rhythm. No murmur or gallop auscultated.  LUNGS:  She has crackles in the right base only.. Lung excursion symmetrical. No change in fremitus.   ABDOMEN: Bowel sounds present. Non-tender, no masses palpated.  EXTREMITIES: Normal muscle tone and joint movement, no cyanosis or clubbing.   LYMPHATICS: No adenopathy palpated, no edema.  SKIN: Dry, intact, no lesions.   NEURO: Cranial nerves II-XII intact. Motor strength 5/5 bilaterally, upper and lower extremities.  PSYCH: Appropriate affect.    Assessment:       1. Abnormal CT lung screening    2. Malignant neoplasm of right female breast, unspecified estrogen receptor status, unspecified site of breast    3. Pleural thickening    4. Radiation fibrosis of lung        It is most likely that these changes to her CT are directly related to her radiation for her breast " cancer.  Patient is not clinically ill.  In fact her symptomatology is improving.  Will get a look at the CT and if it looks like radiation changes will repeat the CT in 3 months.  If it looks more nefarious will discuss with the patient and plan otherwise.  Plan:       Abnormal CT lung screening  -     CT Chest Without Contrast; Future; Expected date: 04/14/2025    Malignant neoplasm of right female breast, unspecified estrogen receptor status, unspecified site of breast    Pleural thickening    Radiation fibrosis of lung         Follow up in about 3 months (around 5/26/2025).       Bring in CT on CD  If expected, will repeat CT in 3 months and then reassess         [1]   Current Outpatient Medications   Medication Sig Dispense Refill    diphenoxylate-atropine 2.5-0.025 mg (LOMOTIL) 2.5-0.025 mg per tablet Take 1 tablet by mouth 4 (four) times daily as needed for Diarrhea. (Patient not taking: Reported on 2/26/2025) 30 tablet 1    duke's soln (benadryl 30 mL, mylanta 30 mL, LIDOcaine 30 mL, nystatin 30 mL) 120mL Take 10 mLs by mouth 4 (four) times daily. (Patient not taking: Reported on 8/29/2024) 240 mL 1    HYDROcodone-acetaminophen (NORCO) 5-325 mg per tablet Take 1 tablet by mouth every 8 (eight) hours as needed for Pain. (Patient not taking: Reported on 8/29/2024) 15 tablet 0    ondansetron (ZOFRAN) 8 MG tablet Take 1 tablet (8 mg total) by mouth every 8 (eight) hours as needed for Nausea. (Patient not taking: Reported on 8/29/2024) 30 tablet 2    promethazine (PHENERGAN) 25 MG tablet Take 1 tablet (25 mg total) by mouth every 4 to 6 hours as needed for Nausea. (Patient not taking: Reported on 8/29/2024) 30 tablet 2     No current facility-administered medications for this visit.

## 2025-03-10 ENCOUNTER — OFFICE VISIT (OUTPATIENT)
Facility: CLINIC | Age: 75
End: 2025-03-10
Payer: MEDICARE

## 2025-03-10 VITALS
BODY MASS INDEX: 24.24 KG/M2 | HEIGHT: 64 IN | WEIGHT: 142 LBS | RESPIRATION RATE: 16 BRPM | DIASTOLIC BLOOD PRESSURE: 78 MMHG | SYSTOLIC BLOOD PRESSURE: 132 MMHG | TEMPERATURE: 97 F | OXYGEN SATURATION: 97 % | HEART RATE: 76 BPM

## 2025-03-10 DIAGNOSIS — C50.911 INVASIVE DUCTAL CARCINOMA OF BREAST, FEMALE, RIGHT: Primary | ICD-10-CM

## 2025-03-10 DIAGNOSIS — D50.0 IRON DEFICIENCY ANEMIA DUE TO CHRONIC BLOOD LOSS: ICD-10-CM

## 2025-03-10 DIAGNOSIS — R94.30 EJECTION FRACTION < 50%: ICD-10-CM

## 2025-03-10 DIAGNOSIS — R19.5 POSITIVE OCCULT STOOL BLOOD TEST: ICD-10-CM

## 2025-03-10 PROCEDURE — 3008F BODY MASS INDEX DOCD: CPT | Mod: CPTII,S$GLB,, | Performed by: NURSE PRACTITIONER

## 2025-03-10 PROCEDURE — 3288F FALL RISK ASSESSMENT DOCD: CPT | Mod: CPTII,S$GLB,, | Performed by: NURSE PRACTITIONER

## 2025-03-10 PROCEDURE — 3075F SYST BP GE 130 - 139MM HG: CPT | Mod: CPTII,S$GLB,, | Performed by: NURSE PRACTITIONER

## 2025-03-10 PROCEDURE — 99999 PR PBB SHADOW E&M-EST. PATIENT-LVL IV: CPT | Mod: PBBFAC,,, | Performed by: NURSE PRACTITIONER

## 2025-03-10 PROCEDURE — 99215 OFFICE O/P EST HI 40 MIN: CPT | Mod: S$GLB,,, | Performed by: NURSE PRACTITIONER

## 2025-03-10 PROCEDURE — G2211 COMPLEX E/M VISIT ADD ON: HCPCS | Mod: S$GLB,,, | Performed by: NURSE PRACTITIONER

## 2025-03-10 PROCEDURE — 1126F AMNT PAIN NOTED NONE PRSNT: CPT | Mod: CPTII,S$GLB,, | Performed by: NURSE PRACTITIONER

## 2025-03-10 PROCEDURE — 1101F PT FALLS ASSESS-DOCD LE1/YR: CPT | Mod: CPTII,S$GLB,, | Performed by: NURSE PRACTITIONER

## 2025-03-10 PROCEDURE — 1159F MED LIST DOCD IN RCRD: CPT | Mod: CPTII,S$GLB,, | Performed by: NURSE PRACTITIONER

## 2025-03-10 PROCEDURE — 3078F DIAST BP <80 MM HG: CPT | Mod: CPTII,S$GLB,, | Performed by: NURSE PRACTITIONER

## 2025-03-10 NOTE — PROGRESS NOTES
[facility] HEMATOLGY ONCOLOGY CONSULTATION    Subjective:       Patient ID: Dora Atwood is a 75 y.o. female returning today for a regularly scheduled follow-up visit.    Chief Complaint: Breast Cancer  HER 2 +       HPI  The patient is here today by herself.     She had previously had a lumpectomy, XRT, and TCHP x 4 cycles. She is on herceptin post XRT. She has had 5 herceptin infusions alone post XRT.  She started to have some chest tightness and cough and Dr. Brooks had ordered a work up. She is now seeing cardiology and pulmonary. Her chest tightness has improved.     Post mammogram and US - stable from 2/7/2025 and will repeat right breast Mammo in 6 months   She is now establishing with [facility] due to Dr. Treviño retiring.     She did have a stress test about 3 weeks ago with Dr. Tafoya and that was clear.     She was having some chest pain and pleural pain, she did follow up with Dr. Frye and she was going to repeat a CT scan in the chest.     She does state that the chest heaviness has improved.     ECHO from 2/4/2025 does show a drop in EF from 65% to 40-45%. Her herceptin is on hold.       Past Medical History:   Diagnosis Date    Breast cancer 01/01/2024    Incisional hernia     from gallbladder surgery       Past Surgical History:   Procedure Laterality Date    BREAST LUMPECTOMY Right     CATARACT EXTRACTION Bilateral 01/2024    CHOLECYSTECTOMY  2022    INJECTION FOR SENTINEL NODE IDENTIFICATION Right 07/30/2024    Procedure: INJECTION, FOR SENTINEL NODE IDENTIFICATION;  Surgeon: Waleska Treviño MD;  Location: [facility];  Service: General;  Laterality: Right;    INSERTION OF TUNNELED CENTRAL VENOUS CATHETER (CVC) WITH SUBCUTANEOUS PORT Right 03/19/2024    Procedure: INSERTION, PORT-A-CATH;  Surgeon: Waleska Treviño MD;  Location: [facility];  Service: General;  Laterality: Right;    MASTECTOMY, PARTIAL Right 07/30/2024    Procedure: MASTECTOMY, PARTIAL;  Surgeon: Waleska Treviño MD;  Location: [facility];   Lab Results   Component Value Date    EGFR 4 02/01/2023    EGFR 7 01/31/2023    EGFR 5 01/30/2023    CREATININE 9 94 (H) 02/01/2023    CREATININE 7 29 (H) 01/31/2023    CREATININE 9 62 (H) 01/30/2023     Presented in respiratory distress due to severe volume overload causing pulmonary edema  UF 1/29    Plan:  Nephrology consulted; appreciate recs  HD per nephrology  Daily BMP  Torsemide 100mg daily Service: General;  Laterality: Right;  SYDNIE  (7/25)    TUBAL LIGATION  1981       Social History     Socioeconomic History    Marital status:    Tobacco Use    Smoking status: Never    Smokeless tobacco: Never   Substance and Sexual Activity    Alcohol use: Never    Drug use: Never    Sexual activity: Not Currently     Social Drivers of Health     Financial Resource Strain: Low Risk  (3/21/2024)    Overall Financial Resource Strain (CARDIA)     Difficulty of Paying Living Expenses: Not hard at all   Food Insecurity: No Food Insecurity (3/21/2024)    Hunger Vital Sign     Worried About Running Out of Food in the Last Year: Never true     Ran Out of Food in the Last Year: Never true   Transportation Needs: No Transportation Needs (3/21/2024)    PRAPARE - Transportation     Lack of Transportation (Medical): No     Lack of Transportation (Non-Medical): No   Physical Activity: Sufficiently Active (3/21/2024)    Exercise Vital Sign     Days of Exercise per Week: 6 days     Minutes of Exercise per Session: 30 min   Stress: Stress Concern Present (3/21/2024)    [employer] [place] [employer] - Occupational Stress Questionnaire     Feeling of Stress : To some extent   Housing Stability: Low Risk  (3/21/2024)    Housing Stability Vital Sign     Unable to Pay for Housing in the Last Year: No     Number of Places Lived in the Last Year: 1     Unstable Housing in the Last Year: No       Family History   Problem Relation Name Age of Onset    Rectal cancer Mother      Breast cancer Maternal Aunt         Review of patient's allergies indicates:   Allergen Reactions    Carboplatin Other (See Comments)     Chest tightness and flushing       Current Outpatient Medications:     diphenoxylate-atropine 2.5-0.025 mg (LOMOTIL) 2.5-0.025 mg per tablet, Take 1 tablet by mouth 4 (four) times daily as needed for Diarrhea. (Patient not taking: Reported on 3/10/2025), Disp: 30 tablet, Rfl: 1    duke's soln (benadryl 30 mL,  "mylanta 30 mL, LIDOcaine 30 mL, nystatin 30 mL) 120mL, Take 10 mLs by mouth 4 (four) times daily. (Patient not taking: Reported on 3/10/2025), Disp: 240 mL, Rfl: 1    HYDROcodone-acetaminophen (NORCO) 5-325 mg per tablet, Take 1 tablet by mouth every 8 (eight) hours as needed for Pain. (Patient not taking: Reported on 3/10/2025), Disp: 15 tablet, Rfl: 0    ondansetron (ZOFRAN) 8 MG tablet, Take 1 tablet (8 mg total) by mouth every 8 (eight) hours as needed for Nausea. (Patient not taking: Reported on 3/10/2025), Disp: 30 tablet, Rfl: 2    promethazine (PHENERGAN) 25 MG tablet, Take 1 tablet (25 mg total) by mouth every 4 to 6 hours as needed for Nausea. (Patient not taking: Reported on 3/10/2025), Disp: 30 tablet, Rfl: 2    All medications and past history have been reviewed.    Review of Systems   Constitutional:  Negative for appetite change and unexpected weight change.   HENT:  Negative for mouth sores.    Eyes:  Negative for visual disturbance.   Respiratory:  Negative for cough and shortness of breath.    Cardiovascular:  Negative for chest pain.   Gastrointestinal:  Negative for abdominal pain and diarrhea.   Genitourinary:  Negative for frequency.   Musculoskeletal:  Negative for back pain.   Skin:  Negative for rash.   Neurological:  Negative for headaches.   Hematological:  Negative for adenopathy.   Psychiatric/Behavioral:  The patient is not nervous/anxious.        Objective:        /78 (BP Location: Right arm, Patient Position: Sitting)   Pulse 76   Temp 97.1 °F (36.2 °C) (Temporal)   Resp 16   Ht 5' 4" (1.626 m)   Wt 64.4 kg (142 lb)   SpO2 97%   BMI 24.37 kg/m²     Physical Exam  Constitutional:       Appearance: Normal appearance.   HENT:      Head: Normocephalic and atraumatic.      Mouth/Throat:      Mouth: Mucous membranes are moist.   Cardiovascular:      Rate and Rhythm: Normal rate and regular rhythm.      Pulses: Normal pulses.      Heart sounds: Normal heart sounds.   Pulmonary: "      Effort: Pulmonary effort is normal. No respiratory distress.      Breath sounds: Normal breath sounds. No wheezing.   Abdominal:      General: There is no distension.      Palpations: Abdomen is soft. There is no mass.      Tenderness: There is no abdominal tenderness.   Musculoskeletal:         General: No swelling. Normal range of motion.      Right lower leg: No edema.      Left lower leg: No edema.   Skin:     General: Skin is warm and dry.      Capillary Refill: Capillary refill takes 2 to 3 seconds.      Findings: No bruising or rash.   Neurological:      Mental Status: She is alert and oriented to person, place, and time. Mental status is at baseline.      Motor: No weakness.   Psychiatric:         Mood and Affect: Mood normal.         Behavior: Behavior normal.           Lab:     suggestion  Result Information displayed in this report will not trend and may not trigger automated decision support.      Ref Range & Units 4 d ago   WBC 4.8 - 10.8 iglesia/L 4.5 Low    RBC 4.50 - 5.50 tril/L 3.82 Low    Hemoglobin 12.0 - 15.0 g/dL 11.4 Low    Hematocrit 37.0 - 47.0 % 33.5 Low    MCV 81 - 97 fL 88   MCH 27 - 32 pg 30   MCHC 32 - 36 g/dL 34   RDW 11.5 - 14.5 % 15.3 High    Platelets 150 - 400 iglesia/L 309   MPV 7.4 - 10.4 fL 6.9 Low    Granulocytes % 42.2 - 75.2 % 77 High    Lymphocytes % 20.5 - 51.1 % 10.7 Low    Monocytes % 1.7 - 9.3 % 8.6   Eosinophils % 0.0 - <5.0 % 2.8   Basophils % 0.0 - <5.0 % 0.9   Granulocytes Absolute 1.4 - 6.5 K/UL 3.4   Lymphocytes, Absolute 1.2 - 3.4 K/uL 0.5 Low    Monocyte Absolute 0.11 - 0.59 K/uL 0.4   Eosinophils, Absolute 0.00 - 0.70 K/UL 0.1   Basophils, Absolute 0.00 - 0.20 K/UL 0   ANC 1400 - 6500 /uL 3,400   nRBC% 0 - 0 /100 0     Specimen Collected: 03/06/25 14:56    Performed by: [facility] LAB Last Resulted: 03/06/25 15:44   Received From: [facility]  Result Received: 03/10/25 08:37    View Encounter        Received Information   Contains  abnormal data COMPREHENSIVE METABOLIC PANEL  Order: [id number]   suggestion  Result Information displayed in this report will not trend and may not trigger automated decision support.      Ref Range & Units 4 d ago   Sodium 136 - 145 mmol/L 139   Potassium 3.4 - 4.5 mmol/L 3.6   Chloride 98 - 107 mmol/L 104   CO2 21 - 31 mmol/L 30   BUN 7 - 25 mg/dL 13   Creatinine 0.60 - 1.30 mg/dL 0.56 Low    Glucose 74 - 109 mg/dL 90   Calcium 8.6 - 10.3 mg/dL 9.2   Albumin 3.5 - 5.2 g/dL 3.7   AST 13 - 39 U/L 17   ALT 7 - 52 U/L 16   Alkaline Phosphatase 34 - 104 U/L 114 High    Total Bilirubin 0.3 - 1.0 mg/dL 0.2 Low    Protein Total 6.4 - 8.9 g/dL 6.2 Low    Anion Gap 3 - 15 mmol/L 5   Albumin/Globulin Ratio 1.0 - 2.0 1.5   Globulin 2.0 - 4.0 g/dL 2.5   Osmolality Calc 275 - 295 mOsmol/kg 277   eGFR >=60 mL/min 96   Narrative    FAILED DELTA CHECK- an abrupt change in consecutive values is detected. Caution should be exercised in the interpretation of these results.  eGFR/eGFRaa is not calculated for individuals under the age of 18 years old or  over the age of 90.    Specimen Collected: 03/06/25 14:56    Performed by: [facility] CC LAB Last Resulted: 03/06/25 16:03   Received From: [facility] and [facility]  Result Received: 03/10/25 08:37       Radiology/Diagnostic Studies:    ECHO 2/4/2025:     Left Ventricle: The left ventricle is normal in size. Normal wall thickness. Mild global hypokinesis present. There is mildly reduced systolic function with a visually estimated ejection fraction of 40 - 45%. There is normal diastolic function.    Right Ventricle: Systolic function is normal.    Right Atrium: Right atrium is mildly dilated.    Tricuspid Valve: There is mild regurgitation.    Pulmonary Artery: The estimated pulmonary artery systolic pressure is 30 mmHg.    IVC/SVC: Normal venous pressure at 3 mmHg.      Mammogram and US 2/7/2025  Impression:     Stable postsurgical changes in the upper outer right  breast with removal of the suspicious calcifications.  There is mild skin thickening on the right compatible with postradiation changes     No mammographic abnormality in the left breast     4 mm benign-appearing lymph node in the right axilla with no additional adenopathy     Recommendation: Follow-up right mammogram in 6 months is recommended.     BI-RADS CATEGORY: 3  PROBABLY BENIGN FINDING - SHORT TERM INTERVAL FOLLOWUP SUGGESTED.     Technologist: MELISSA        Electronically signed by:Dianna Pires  Date:                                            02/07/2025  Time:                                           12:01    All lab results and imaging results have been reviewed and discussed with the patient.   Assessment/Plan:       1. Invasive ductal carcinoma of breast, female, right    2. Positive occult stool blood test    3. Ejection fraction < 50%    4. Iron deficiency anemia due to chronic blood loss          Invasive ductal carcinoma of breast, female, right  -     Echo; Future    Positive occult stool blood test  -     Ambulatory referral/consult to Gastroenterology; Future; Expected date: 03/17/2025    Ejection fraction < 50%  -     Echo; Future    Iron deficiency anemia due to chronic blood loss            PLAN:   Hold herceptin due to drop in EF - repeat echo now  Continue cardiac work up with Dr. Camacho team - stress test negative per patient   Labs stable   Following up with Dr. Rehman this week to establish care   Mammogram and US reviewed repeat right breast mammogram in 6 months.   Seeing pulm at the end of  this month due to abnormal CT scan of the chest that showed some pleural thickening, repeat CT of her chest in 3 months per Dr. Frye   Continue IV iron at [facility] as needed - she has two more treatment   She turned into her stool studies, and they were positive, referral to GI   Talked about risk factors for BRCA 2      Cancer Staging   Invasive ductal carcinoma of breast, female,  right  Staging form: Breast, AJCC 8th Edition  - Clinical: Stage IIIB (cT1b, cN3c, cM0, G2, ER-, DC-, HER2+) - Signed by Meño Villanueva Jr., MD on 7/1/2024  - Pathologic: ypT0, pN0(sn), cM0 - Signed by Meño Villanueva Jr., MD on 11/12/2024        I have explained and the patient understands all of  the current recommendation(s). I have answered all of their questions to the best of my ability and to their complete satisfaction.   The patient is to continue with the current management plan.            Electronically signed by: Isabell Gayle, MSN, APRN, AGNP-C      Cellcept Counseling:  I discussed with the patient the risks of mycophenolate mofetil including but not limited to infection/immunosuppression, GI upset, hypokalemia, hypercholesterolemia, bone marrow suppression, lymphoproliferative disorders, malignancy, GI ulceration/bleed/perforation, colitis, interstitial lung disease, kidney failure, progressive multifocal leukoencephalopathy, and birth defects.  The patient understands that monitoring is required including a baseline creatinine and regular CBC testing. In addition, patient must alert us immediately if symptoms of infection or other concerning signs are noted.

## 2025-03-11 ENCOUNTER — PATIENT MESSAGE (OUTPATIENT)
Dept: SURGERY | Facility: CLINIC | Age: 75
End: 2025-03-11
Payer: MEDICARE

## 2025-03-14 ENCOUNTER — OFFICE VISIT (OUTPATIENT)
Dept: SURGERY | Facility: CLINIC | Age: 75
End: 2025-03-14
Payer: MEDICARE

## 2025-03-14 VITALS
BODY MASS INDEX: 24.24 KG/M2 | HEIGHT: 64 IN | SYSTOLIC BLOOD PRESSURE: 143 MMHG | HEART RATE: 83 BPM | DIASTOLIC BLOOD PRESSURE: 96 MMHG | TEMPERATURE: 98 F | WEIGHT: 142 LBS

## 2025-03-14 DIAGNOSIS — C50.911 INVASIVE DUCTAL CARCINOMA OF BREAST, FEMALE, RIGHT: Primary | ICD-10-CM

## 2025-03-14 PROCEDURE — 99999 PR PBB SHADOW E&M-EST. PATIENT-LVL III: CPT | Mod: PBBFAC,,, | Performed by: SURGERY

## 2025-03-14 NOTE — H&P
GENERAL SURGERY  OUTPATIENT H&P    REASON FOR VISIT/CC: Establish care    HPI: Dora Atwood is a 75 y.o. female. History of right breast cancer, ER/ID-, HER2+, with positive right axillary lymph node status post neoadjuvant chemotherapy, partial mastectomy and sentinel lymph node biopsy with no residual disease, adjuvant chemotherapy which is currently on hold. That has followed by Dr. Boyer with Medical Oncology.  Previously followed by Dr. Treviño with surgery who has since retired. It is here today to establish care. Recently chemotherapy has been on hold due to chest tightness. Has seen pulmonology and Cardiology. 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  Patient scheduled for repeat echocardiogram on 03/21/2025. Patient is BRCA positive but has refused bilateral mastectomy. Currently has a right-sided subclavian port in place. Last underwent bilateral mammogram on 02/07/2025. Had some postsurgical changes and benign-appearing lymph node on the right side.  Short interval follow-up recommended.  Patient is scheduled for repeat right breast imaging in August.    I have reviewed the patient's chart including prior progress notes, procedures and testing.     ROS:   Review of Systems    PROBLEM LIST:  Problem List[1]      HISTORY  Past Medical History:   Diagnosis Date    Breast cancer 01/01/2024    Incisional hernia     from gallbladder surgery       Past Surgical History:   Procedure Laterality Date    BREAST LUMPECTOMY Right     CATARACT EXTRACTION Bilateral 01/2024    CHOLECYSTECTOMY  2022    INJECTION FOR SENTINEL NODE IDENTIFICATION Right 07/30/2024    Procedure: INJECTION, FOR SENTINEL NODE IDENTIFICATION;  Surgeon: Waleska Treviño MD;  Location: [facility];  Service: General;  Laterality: Right;    INSERTION OF TUNNELED CENTRAL VENOUS CATHETER (CVC) WITH SUBCUTANEOUS PORT Right 03/19/2024    Procedure: INSERTION, PORT-A-CATH;  Surgeon: Waleska Treviño MD;   Location: [facility] OR;  Service: General;  Laterality: Right;    MASTECTOMY, PARTIAL Right 07/30/2024    Procedure: MASTECTOMY, PARTIAL;  Surgeon: Waleska Treviño MD;  Location: [facility] OR;  Service: General;  Laterality: Right;  SYDNIE FITZGERALD (7/25)    TUBAL LIGATION  1981       Social History[2]    Family History   Problem Relation Name Age of Onset    Rectal cancer Mother      Breast cancer Maternal Aunt           MEDS:  Medications Ordered Prior to Encounter[3]    ALLERGIES:  Review of patient's allergies indicates:   Allergen Reactions    Carboplatin Other (See Comments)     Chest tightness and flushing         VITALS:  Vitals:    03/14/25 0857   BP: (!) 143/96   Pulse: 83   Temp: 97.6 °F (36.4 °C)         PHYSICAL EXAM:  Physical Exam  Vitals reviewed.   Constitutional:       General: She is not in acute distress.     Appearance: Normal appearance. She is well-developed.   HENT:      Head: Normocephalic and atraumatic.      Nose: Nose normal.   Eyes:      General: No scleral icterus.     Conjunctiva/sclera: Conjunctivae normal.   Neck:      Trachea: No tracheal tenderness or tracheal deviation.   Cardiovascular:      Rate and Rhythm: Normal rate and regular rhythm.      Pulses: Normal pulses.   Pulmonary:      Effort: Pulmonary effort is normal. No accessory muscle usage or respiratory distress.      Breath sounds: Normal breath sounds.   Chest:      Comments: Right-sided port in place without signs of infection or breakdown  Abdominal:      General: There is no distension.      Palpations: Abdomen is soft.      Tenderness: There is no abdominal tenderness.   Musculoskeletal:         General: No swelling or tenderness. Normal range of motion.      Cervical back: Normal range of motion and neck supple. No rigidity.   Skin:     General: Skin is warm and dry.      Coloration: Skin is not jaundiced.      Findings: No erythema.   Neurological:      General: No focal deficit present.      Mental Status: She is alert and  "oriented to person, place, and time.      Motor: No weakness or abnormal muscle tone.   Psychiatric:         Mood and Affect: Mood normal.         Behavior: Behavior normal.         Thought Content: Thought content normal.         Judgment: Judgment normal.           LABS:  Lab Results   Component Value Date    WBC 4.19 07/26/2024    RBC 4.16 07/26/2024    HGB 12.3 07/26/2024    HCT 39.2 07/26/2024     07/26/2024     Lab Results   Component Value Date    GLU 85 07/26/2024     07/26/2024    K 4.0 07/26/2024     07/26/2024    CO2 27 07/26/2024    BUN 13 07/26/2024    CREATININE 0.6 07/26/2024    CALCIUM 9.4 07/26/2024     No results found for: "ALT", "AST", "GGT", "ALKPHOS", "BILITOT"  No results found for: "MG", "PHOS"    STUDIES:  Images and reports were personally reviewed.  Bilateral mammogram 02/07/2025  FINDINGS:  There are scattered fibroglandular densities bilaterally.  There are stereotactic biopsy clips in the upper outer right breast.  There are scattered benign calcifications in the upper outer right breast.  The pleomorphic calcifications within the upper-outer right breast have been removed.  There is a Lumpectomy radiation marker in the deep superior right breast seen only on the MLO view.     There is a Port-A-Cath in the right axilla.     There is mild skin thickening of the right breast.  Findings are compatible with postradiation changes.     No suspicious mass, microcalcification, or architectural distortion.     Right breast ultrasound is compared to a prior study dated 07/25/2024     There is a 4 mm benign-appearing lymph node in the right axillary tail.  This contains a fatty hilum.  This previously measured 6 x 5 x 3 mm there are no additional axillary lymph nodes.     Impression:     Stable postsurgical changes in the upper outer right breast with removal of the suspicious calcifications.  There is mild skin thickening on the right compatible with postradiation changes   "   No mammographic abnormality in the left breast     4 mm benign-appearing lymph node in the right axilla with no additional adenopathy     Recommendation: Follow-up right mammogram in 6 months is recommended.     BI-RADS CATEGORY: 3  PROBABLY BENIGN FINDING - SHORT TERM INTERVAL FOLLOWUP SUGGESTED.    ASSESSMENT & PLAN:  75 y.o. female with BRCA 2 positivity, history of right breast cancer, ER/[place] negative, HER2 positive, grade 2 status post neoadjuvant therapy, partial mastectomy and sentinel lymph node, adjuvant radiation and chemotherapy  -currently closely followed by Dr. Boyer office  -therapy on hold until cardiac workup is completed  -has deferred bilateral mastectomy for BRCA 2 positivity  -no immediate surgical indications at this time  -keep echocardiogram as scheduled and follow up with Dr. Adkins office as scheduled  -repeat mammogram right side has been ordered               [1]   Patient Active Problem List  Diagnosis    Invasive ductal carcinoma of breast, female, right    Chemotherapy induced neutropenia    Iron deficiency anemia, unspecified    Ejection fraction < 50%   [2]   Social History  Tobacco Use    Smoking status: Never    Smokeless tobacco: Never   Substance Use Topics    Alcohol use: Never    Drug use: Never   [3]   Current Outpatient Medications on File Prior to Visit   Medication Sig Dispense Refill    diphenoxylate-atropine 2.5-0.025 mg (LOMOTIL) 2.5-0.025 mg per tablet Take 1 tablet by mouth 4 (four) times daily as needed for Diarrhea. 30 tablet 1    HYDROcodone-acetaminophen (NORCO) 5-325 mg per tablet Take 1 tablet by mouth every 8 (eight) hours as needed for Pain. 15 tablet 0    ondansetron (ZOFRAN) 8 MG tablet Take 1 tablet (8 mg total) by mouth every 8 (eight) hours as needed for Nausea. 30 tablet 2    promethazine (PHENERGAN) 25 MG tablet Take 1 tablet (25 mg total) by mouth every 4 to 6 hours as needed for Nausea. 30 tablet 2    duke's soln (benadryl 30 mL, mylanta 30 mL,  LIDOcaine 30 mL, nystatin 30 mL) 120mL Take 10 mLs by mouth 4 (four) times daily. (Patient not taking: Reported on 3/10/2025) 240 mL 1     No current facility-administered medications on file prior to visit.

## 2025-03-21 ENCOUNTER — HOSPITAL ENCOUNTER (OUTPATIENT)
Dept: CARDIOLOGY | Facility: HOSPITAL | Age: 75
Discharge: HOME | End: 2025-03-21
Attending: NURSE PRACTITIONER
Payer: MEDICARE

## 2025-03-21 DIAGNOSIS — R94.30 EJECTION FRACTION < 50%: ICD-10-CM

## 2025-03-21 DIAGNOSIS — C50.911 INVASIVE DUCTAL CARCINOMA OF BREAST, FEMALE, RIGHT: ICD-10-CM

## 2025-03-21 LAB
AORTIC ROOT ANNULUS: 2.9 CM
AORTIC VALVE CUSP SEPERATION: 1.6 CM
APICAL FOUR CHAMBER EJECTION FRACTION: 57 %
AV INDEX (PROSTH): 0.79
AV MEAN GRADIENT: 5 MMHG
AV PEAK GRADIENT: 9 MMHG
AV VALVE AREA BY VELOCITY RATIO: 2.3 CM²
AV VALVE AREA: 2.5 CM²
AV VELOCITY RATIO: 0.73
CV ECHO LV RWT: 0.57 CM
DOP CALC AO PEAK VEL: 1.5 M/S
DOP CALC AO VTI: 32.1 CM
DOP CALC LVOT AREA: 3.1 CM2
DOP CALC LVOT DIAMETER: 2 CM
DOP CALC LVOT PEAK VEL: 1.1 M/S
DOP CALC LVOT STROKE VOLUME: 79.1 CM3
DOP CALCLVOT PEAK VEL VTI: 25.2 CM
E WAVE DECELERATION TIME: 195 MSEC
E/A RATIO: 0.85
E/E' RATIO: 11 M/S
ECHO LV POSTERIOR WALL: 1.2 CM (ref 0.6–1.1)
FRACTIONAL SHORTENING: 28.6 % (ref 28–44)
INTERVENTRICULAR SEPTUM: 1 CM (ref 0.6–1.1)
IVRT: 106 MSEC
LEFT ATRIUM AREA SYSTOLIC (APICAL 2 CHAMBER): 15.4 CM2
LEFT ATRIUM AREA SYSTOLIC (APICAL 4 CHAMBER): 15.3 CM2
LEFT ATRIUM SIZE: 3.3 CM
LEFT ATRIUM VOLUME MOD: 41 ML
LEFT INTERNAL DIMENSION IN SYSTOLE: 3 CM (ref 2.1–4)
LEFT VENTRICLE DIASTOLIC VOLUME: 77 ML
LEFT VENTRICLE END DIASTOLIC VOLUME APICAL 4 CHAMBER: 56.8 ML
LEFT VENTRICLE END SYSTOLIC VOLUME APICAL 2 CHAMBER: 38.9 ML
LEFT VENTRICLE END SYSTOLIC VOLUME APICAL 4 CHAMBER: 41.5 ML
LEFT VENTRICLE SYSTOLIC VOLUME: 35 ML
LEFT VENTRICULAR INTERNAL DIMENSION IN DIASTOLE: 4.2 CM (ref 3.5–6)
LEFT VENTRICULAR MASS: 157.1 G
LV LATERAL E/E' RATIO: 15.5 M/S
LV SEPTAL E/E' RATIO: 8.5 M/S
LVED V (TEICH): 76.8 ML
LVES V (TEICH): 35 ML
LVOT MG: 2 MMHG
LVOT MV: 0.71 CM/S
MV PEAK A VEL: 1.1 M/S
MV PEAK E VEL: 0.93 M/S
MV STENOSIS PRESSURE HALF TIME: 62 MS
MV VALVE AREA P 1/2 METHOD: 3.55 CM2
OHS CV RV/LV RATIO: 0.45 CM
PISA TR MAX VEL: 2.4 M/S
PV MV: 0.77 M/S
PV PEAK GRADIENT: 6 MMHG
PV PEAK VELOCITY: 1.22 M/S
RIGHT VENTRICLE DIASTOLIC BASEL DIMENSION: 1.9 CM
RIGHT VENTRICULAR END-DIASTOLIC DIMENSION: 1.86 CM
TDI LATERAL: 0.06 M/S
TDI SEPTAL: 0.11 M/S
TDI: 0.09 M/S
TR MAX PG: 23 MMHG

## 2025-03-21 PROCEDURE — 93306 TTE W/DOPPLER COMPLETE: CPT | Mod: 26,,, | Performed by: GENERAL PRACTICE

## 2025-03-21 PROCEDURE — 93356 MYOCRD STRAIN IMG SPCKL TRCK: CPT | Mod: ,,, | Performed by: GENERAL PRACTICE

## 2025-03-21 PROCEDURE — 93306 TTE W/DOPPLER COMPLETE: CPT

## 2025-03-24 ENCOUNTER — INFUSION (OUTPATIENT)
Dept: INFUSION THERAPY | Facility: HOSPITAL | Age: 75
End: 2025-03-24
Attending: INTERNAL MEDICINE
Payer: MEDICARE

## 2025-03-24 VITALS
OXYGEN SATURATION: 99 % | TEMPERATURE: 97 F | SYSTOLIC BLOOD PRESSURE: 120 MMHG | HEIGHT: 64 IN | DIASTOLIC BLOOD PRESSURE: 69 MMHG | WEIGHT: 140.88 LBS | HEART RATE: 90 BPM | RESPIRATION RATE: 18 BRPM | BODY MASS INDEX: 24.05 KG/M2

## 2025-03-24 DIAGNOSIS — C50.911 INVASIVE DUCTAL CARCINOMA OF BREAST, FEMALE, RIGHT: Primary | ICD-10-CM

## 2025-03-24 PROCEDURE — 96523 IRRIG DRUG DELIVERY DEVICE: CPT

## 2025-03-24 RX ORDER — HEPARIN 100 UNIT/ML
500 SYRINGE INTRAVENOUS
Status: DISCONTINUED | OUTPATIENT
Start: 2025-03-24 | End: 2025-03-24 | Stop reason: HOSPADM

## 2025-03-24 RX ORDER — SODIUM CHLORIDE 0.9 % (FLUSH) 0.9 %
10 SYRINGE (ML) INJECTION
Status: DISCONTINUED | OUTPATIENT
Start: 2025-03-24 | End: 2025-03-24 | Stop reason: HOSPADM

## 2025-03-24 RX ORDER — HEPARIN 100 UNIT/ML
500 SYRINGE INTRAVENOUS
OUTPATIENT
Start: 2025-03-24

## 2025-03-24 RX ORDER — SODIUM CHLORIDE 0.9 % (FLUSH) 0.9 %
10 SYRINGE (ML) INJECTION
OUTPATIENT
Start: 2025-03-24

## 2025-03-24 NOTE — PLAN OF CARE
Problem: Fatigue  Goal: Improved Activity Tolerance  Outcome: Progressing  Intervention: Promote Improved Energy  Flowsheets (Taken 3/24/2025 0900)  Fatigue Management: activity schedule adjusted  Sleep/Rest Enhancement: regular sleep/rest pattern promoted  Activity Management: Ambulated -L4  Environmental Support:   environmental consistency promoted   personal routine supported

## 2025-03-25 LAB
AORTIC ROOT ANNULUS: 2.9 CM
AORTIC VALVE CUSP SEPERATION: 1.6 CM
APICAL FOUR CHAMBER EJECTION FRACTION: 57 %
AV INDEX (PROSTH): 0.79
AV MEAN GRADIENT: 5 MMHG
AV PEAK GRADIENT: 9 MMHG
AV VALVE AREA BY VELOCITY RATIO: 2.3 CM²
AV VALVE AREA: 2.5 CM²
AV VELOCITY RATIO: 0.73
CV ECHO LV RWT: 0.57 CM
DOP CALC AO PEAK VEL: 1.5 M/S
DOP CALC AO VTI: 32.1 CM
DOP CALC LVOT AREA: 3.1 CM2
DOP CALC LVOT DIAMETER: 2 CM
DOP CALC LVOT PEAK VEL: 1.1 M/S
DOP CALC LVOT STROKE VOLUME: 79.1 CM3
DOP CALCLVOT PEAK VEL VTI: 25.2 CM
E WAVE DECELERATION TIME: 195 MSEC
E/A RATIO: 0.85
E/E' RATIO: 11 M/S
ECHO LV POSTERIOR WALL: 1.2 CM (ref 0.6–1.1)
FRACTIONAL SHORTENING: 28.6 % (ref 28–44)
GLOBAL LONGITUIDAL STRAIN: -12 %
INTERVENTRICULAR SEPTUM: 1 CM (ref 0.6–1.1)
IVRT: 106 MSEC
LEFT ATRIUM AREA SYSTOLIC (APICAL 2 CHAMBER): 15.4 CM2
LEFT ATRIUM AREA SYSTOLIC (APICAL 4 CHAMBER): 15.3 CM2
LEFT ATRIUM SIZE: 3.3 CM
LEFT ATRIUM VOLUME INDEX MOD: 24 ML/M2
LEFT ATRIUM VOLUME MOD: 41 ML
LEFT INTERNAL DIMENSION IN SYSTOLE: 3 CM (ref 2.1–4)
LEFT VENTRICLE DIASTOLIC VOLUME INDEX: 45.56 ML/M2
LEFT VENTRICLE DIASTOLIC VOLUME: 77 ML
LEFT VENTRICLE END DIASTOLIC VOLUME APICAL 4 CHAMBER: 56.8 ML
LEFT VENTRICLE END SYSTOLIC VOLUME APICAL 2 CHAMBER: 38.9 ML
LEFT VENTRICLE END SYSTOLIC VOLUME APICAL 4 CHAMBER: 41.5 ML
LEFT VENTRICLE MASS INDEX: 92.9 G/M2
LEFT VENTRICLE SYSTOLIC VOLUME INDEX: 20.7 ML/M2
LEFT VENTRICLE SYSTOLIC VOLUME: 35 ML
LEFT VENTRICULAR INTERNAL DIMENSION IN DIASTOLE: 4.2 CM (ref 3.5–6)
LEFT VENTRICULAR MASS: 157.1 G
LV LATERAL E/E' RATIO: 15.5 M/S
LV SEPTAL E/E' RATIO: 8.5 M/S
LVED V (TEICH): 76.8 ML
LVES V (TEICH): 35 ML
LVOT MG: 2 MMHG
LVOT MV: 0.71 CM/S
MV PEAK A VEL: 1.1 M/S
MV PEAK E VEL: 0.93 M/S
MV STENOSIS PRESSURE HALF TIME: 62 MS
MV VALVE AREA P 1/2 METHOD: 3.55 CM2
OHS CV RV/LV RATIO: 0.45 CM
PISA TR MAX VEL: 2.4 M/S
PV MV: 0.77 M/S
PV PEAK GRADIENT: 6 MMHG
PV PEAK VELOCITY: 1.22 M/S
RIGHT VENTRICLE DIASTOLIC BASEL DIMENSION: 1.9 CM
RIGHT VENTRICULAR END-DIASTOLIC DIMENSION: 1.86 CM
TDI LATERAL: 0.06 M/S
TDI SEPTAL: 0.11 M/S
TDI: 0.09 M/S
TR MAX PG: 23 MMHG
Z-SCORE OF LEFT VENTRICULAR DIMENSION IN END DIASTOLE: -1.14
Z-SCORE OF LEFT VENTRICULAR DIMENSION IN END SYSTOLE: 0.23

## 2025-04-02 ENCOUNTER — TELEPHONE (OUTPATIENT)
Dept: PULMONOLOGY | Facility: CLINIC | Age: 75
End: 2025-04-02
Payer: MEDICARE

## 2025-04-02 ENCOUNTER — TELEPHONE (OUTPATIENT)
Facility: CLINIC | Age: 75
End: 2025-04-02

## 2025-04-02 ENCOUNTER — OFFICE VISIT (OUTPATIENT)
Facility: CLINIC | Age: 75
End: 2025-04-02
Payer: MEDICARE

## 2025-04-02 ENCOUNTER — LAB VISIT (OUTPATIENT)
Dept: LAB | Facility: HOSPITAL | Age: 75
End: 2025-04-02
Attending: INTERNAL MEDICINE
Payer: MEDICARE

## 2025-04-02 VITALS
RESPIRATION RATE: 16 BRPM | DIASTOLIC BLOOD PRESSURE: 67 MMHG | SYSTOLIC BLOOD PRESSURE: 143 MMHG | BODY MASS INDEX: 24.41 KG/M2 | TEMPERATURE: 98 F | HEART RATE: 80 BPM | OXYGEN SATURATION: 98 % | HEIGHT: 64 IN | WEIGHT: 143 LBS

## 2025-04-02 DIAGNOSIS — D50.0 IRON DEFICIENCY ANEMIA DUE TO CHRONIC BLOOD LOSS: Primary | ICD-10-CM

## 2025-04-02 DIAGNOSIS — C50.911 INVASIVE DUCTAL CARCINOMA OF BREAST, FEMALE, RIGHT: ICD-10-CM

## 2025-04-02 DIAGNOSIS — D50.0 IRON DEFICIENCY ANEMIA DUE TO CHRONIC BLOOD LOSS: ICD-10-CM

## 2025-04-02 LAB
ABSOLUTE EOSINOPHIL (SMH): 0.14 K/UL
ABSOLUTE MONOCYTE (SMH): 0.46 K/UL (ref 0.3–1)
ABSOLUTE NEUTROPHIL COUNT (SMH): 3.9 K/UL (ref 1.8–7.7)
ALBUMIN SERPL-MCNC: 4 G/DL (ref 3.5–5.2)
ALP SERPL-CCNC: 115 UNIT/L (ref 55–135)
ALT SERPL-CCNC: 14 UNIT/L (ref 10–44)
ANION GAP (SMH): 8 MMOL/L (ref 8–16)
AST SERPL-CCNC: 16 UNIT/L (ref 10–40)
BASOPHILS # BLD AUTO: 0.05 K/UL
BASOPHILS NFR BLD AUTO: 1 %
BILIRUB SERPL-MCNC: 0.3 MG/DL (ref 0.1–1)
BUN SERPL-MCNC: 13 MG/DL (ref 8–23)
CALCIUM SERPL-MCNC: 9.3 MG/DL (ref 8.7–10.5)
CHLORIDE SERPL-SCNC: 102 MMOL/L (ref 95–110)
CO2 SERPL-SCNC: 29 MMOL/L (ref 23–29)
CREAT SERPL-MCNC: 0.6 MG/DL (ref 0.5–1.4)
ERYTHROCYTE [DISTWIDTH] IN BLOOD BY AUTOMATED COUNT: 15.3 % (ref 11.5–14.5)
FERRITIN SERPL-MCNC: 131.8 NG/ML (ref 20–300)
GFR SERPLBLD CREATININE-BSD FMLA CKD-EPI: >60 ML/MIN/1.73/M2
GLUCOSE SERPL-MCNC: 85 MG/DL (ref 70–110)
HCT VFR BLD AUTO: 39.2 % (ref 37–48.5)
HGB BLD-MCNC: 12.7 GM/DL (ref 12–16)
IMM GRANULOCYTES # BLD AUTO: 0.01 K/UL (ref 0–0.04)
IMM GRANULOCYTES NFR BLD AUTO: 0.2 % (ref 0–0.5)
LYMPHOCYTES # BLD AUTO: 0.62 K/UL (ref 1–4.8)
MCH RBC QN AUTO: 29.5 PG (ref 27–31)
MCHC RBC AUTO-ENTMCNC: 32.4 G/DL (ref 32–36)
MCV RBC AUTO: 91 FL (ref 82–98)
NUCLEATED RBC (/100WBC) (SMH): 0 /100 WBC
PLATELET # BLD AUTO: 244 K/UL (ref 150–450)
PMV BLD AUTO: 8.6 FL (ref 9.2–12.9)
POTASSIUM SERPL-SCNC: 3.7 MMOL/L (ref 3.5–5.1)
PROT SERPL-MCNC: 7.2 GM/DL (ref 6–8.4)
RBC # BLD AUTO: 4.31 M/UL (ref 4–5.4)
RELATIVE EOSINOPHIL (SMH): 2.7 % (ref 0–8)
RELATIVE LYMPHOCYTE (SMH): 12.1 % (ref 18–48)
RELATIVE MONOCYTE (SMH): 8.9 % (ref 4–15)
RELATIVE NEUTROPHIL (SMH): 75.1 % (ref 38–73)
SODIUM SERPL-SCNC: 139 MMOL/L (ref 136–145)
WBC # BLD AUTO: 5.14 K/UL (ref 3.9–12.7)

## 2025-04-02 PROCEDURE — 36415 COLL VENOUS BLD VENIPUNCTURE: CPT

## 2025-04-02 PROCEDURE — 86300 IMMUNOASSAY TUMOR CA 15-3: CPT

## 2025-04-02 PROCEDURE — 3078F DIAST BP <80 MM HG: CPT | Mod: CPTII,S$GLB,, | Performed by: INTERNAL MEDICINE

## 2025-04-02 PROCEDURE — 1159F MED LIST DOCD IN RCRD: CPT | Mod: CPTII,S$GLB,, | Performed by: INTERNAL MEDICINE

## 2025-04-02 PROCEDURE — 3008F BODY MASS INDEX DOCD: CPT | Mod: CPTII,S$GLB,, | Performed by: INTERNAL MEDICINE

## 2025-04-02 PROCEDURE — G2211 COMPLEX E/M VISIT ADD ON: HCPCS | Mod: S$GLB,,, | Performed by: INTERNAL MEDICINE

## 2025-04-02 PROCEDURE — 82728 ASSAY OF FERRITIN: CPT

## 2025-04-02 PROCEDURE — 99999 PR PBB SHADOW E&M-EST. PATIENT-LVL III: CPT | Mod: PBBFAC,,, | Performed by: INTERNAL MEDICINE

## 2025-04-02 PROCEDURE — 1101F PT FALLS ASSESS-DOCD LE1/YR: CPT | Mod: CPTII,S$GLB,, | Performed by: INTERNAL MEDICINE

## 2025-04-02 PROCEDURE — 3077F SYST BP >= 140 MM HG: CPT | Mod: CPTII,S$GLB,, | Performed by: INTERNAL MEDICINE

## 2025-04-02 PROCEDURE — 3288F FALL RISK ASSESSMENT DOCD: CPT | Mod: CPTII,S$GLB,, | Performed by: INTERNAL MEDICINE

## 2025-04-02 PROCEDURE — 85025 COMPLETE CBC W/AUTO DIFF WBC: CPT

## 2025-04-02 PROCEDURE — 1125F AMNT PAIN NOTED PAIN PRSNT: CPT | Mod: CPTII,S$GLB,, | Performed by: INTERNAL MEDICINE

## 2025-04-02 PROCEDURE — 99215 OFFICE O/P EST HI 40 MIN: CPT | Mod: S$GLB,,, | Performed by: INTERNAL MEDICINE

## 2025-04-02 PROCEDURE — 82040 ASSAY OF SERUM ALBUMIN: CPT

## 2025-04-02 NOTE — PROGRESS NOTES
[facility] [address] Hematology Oncology Subsequent  encounter note    4/2/25    Subjective:      Patient ID:   Dora Atwood  75 y.o. female  1950  Waleska Plasencia Ed Mannina, MD's      Chief Complaint:   R breast Cancer    HPI:   75 y.o. female palpated a hard lump at the area of the tail of the right breast and the anterior right axilla.  The mass was hard, large, but movable and not fixed.  Dr. Meza, her PMD ordered a bilateral diagnostic mammogram and ultrasound.      She has inversion of the left and right nipple, she says this is a chronic finding and her breasts have always been with the inversion.  Pleomorphic microcalcifications extending from the upper central to the upper outer quadrant of the right breast are noted and cover an area of approximately 4 x 8 cm, these are new from prior studies.  The left breast shows no masses or microcalcifications.  There is a large mass at the right axilla measuring 4.5 cm in diameter.      Ultrasound of right breast and axilla show a 1 cm mass 3 cm from the nipple at the 9 o'clock position.  In the skin of the lateral and inferior right breast there are numerous small hypoechoic nodules on the order of 3-6 mm in size.  Ultrasound of the axilla shows a large mass with irregular borders at 3 x 4.8 cm.  Just lateral to this mass is a smaller mass at 3 x 2 cm.  The radiologist recommends biopsy of the nodule at 9:00 a.m. and the large right axillary mass presumed to be a metastatic lymph node.  He notes if the biopsy of the right breast nodule fails to demonstrate concordant histologic diagnosis, then stereotactic biopsy of the microcalcifications can be performed.    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  Focal ductal carcinoma insight 2, intermediate grade, solid pattern was seen.  ERP was negative, PRP was negative, HER2 Lisa was  3+ positive    Biopsy the right axilla lymph node returned positive for metastatic carcinoma.  Residual lymphoid architecture is not seen.    She had onset of menses at age 14.  She is a  2 para 2 miscarriage 0 individual.  She delivered her 1st child at the maternal age of 25 and the 2nd child at the maternal age of 30.  She took birth control pills for approximately 10 years.  She had bilateral tubal ligation at age 30.  She went through the menopause in her mid 40s and she did take hormone replacement therapy for approximately 10 years.      Mom had rectal cancer, dad had diabetes and heart disease.  A paternal aunt had breast cancer, maternal uncle  in his 20s of some type of cancer.  A brother has heart disease.  Another brother  of an overdose, and a 3rd brother  of unknown cause.  She has a sister that she believes may have type of cancer and another sister with diabetes.  Her son and daughter are alive and well.  There is no family history known to her of ovarian, prostate, pancreas, or melanoma cancers.      CAT of head negative for metastatic dx.  ECHO Ej Fx 65-68%  MRI of breast done 3/21/24.  I spoke with Radiology, they are awaiting mamm from , should have a report by 14 days, 24.  PET uptake in mass, R supraclavicular LN, infraclavicular LN, internal mammary LN, R axillary LN, and mild increase in FGD  with skin thickening and edema.    This week, tast is improved, BM NL.  Next Rx 5/15/24, adjusted dosages.  PN at L hand resolved.  Energy 8/10  Cataract surgery 2024.     May 15th she was much improved and in better spirits.  Taste has returned to normal at this time and appetite has recovered, she actually gets hungry.  Her BMI is formed.  Today is day 1 of her 3rd cycle of chemotherapy.  And she does expect that her symptoms will recur later this weekend.  She does have her scopolamine patch in place, without nausea today, she did not have to take Zofran or Phenergan today.   She is going to take the Lomotil on Saturday x3 or 4 days preventively and then will take the Lomotil again if diarrhea symptoms recur.    Now on exam the anterior axillary lymph node is not palpable and the breast mass laterally is not palpable after 2 cycles of chemotherapy.  She did she is developing a small hernia at the site of her previous gallbladder surgery.  The hernia is nontender and easily reducible.    Her hemoglobin is 8.7 and her ferritin returned low at 21.  She could not tolerate oral iron because of GI symptoms.  She does agree to injected for or iron infusion weekly at the [facility] outpatient [facility].  There is a 1 or 2% risk of reaction to iron infusion.  Will give her Benadryl as a premedication and will observe her for 1 hour after each infusion.  Iron replenishment here will facilitate red blood cell production and improve her hemoglobin and should improve her energy.    S/P CTHP x's 4, marked fatigue, altered taste, 4 # weight loss.  For PET 6/28/24.  Defer course % to 7/3 or 7/5 after the PET.  Decide on 4 or 6 cycles of CT after reviewing Pet results.  Continue H and or P after PET.  On Venofer 4/10 to replenish Fe stores.      As of today, she has completed 6 of 10 Fe infusions, no increase in energy yet.  PET scan R breast mass resolved,  lymphadenopathy resolved after 4 cycles of chemo.  Stop chemotherapy now.  She is CR now.    She is BRCA 2 positive.  At this point, 1 option to go with right lumpectomy and sentinel node biopsy followed by adjuvant radiation therapy to the right breast and axilla and other LN areas as per Dr. Villanueva.    Another option is right mastectomy and sentinel node biopsy.  Per Dr. Villanueva's note, she would still need radiation therapy after mastectomy.    Another option would be to go with right mastectomy and sentinel node biopsy with prophylactic left mastectomy.  Estimated risk of new primary breast cancer would be 2 or 3% after  bilateral mastectomy here.    She has decided on R partial mastectomy.  Sentinal node bx,  I believe this is a reasonable option for her.  She follows up with Dr. Treviño 5/24/24 to decide on surgery date.  She will need Rad Rx to breast, axilla, other lissette areas as per Dr. Villanueva.    If residual Dx is found, then Xeloda x's 6 months will be discussed.  No residual Dx seen.    She had R partial mastectomy, SNBx and Bx of area of microcalcifications.  Path reports 7/30/24 and 8/13/24 No Residual Dx.  She is ERP and PRP negative and H2N positive.    She will need adjuvant H2N Rx for 1 year, H, after Rad Rx completed.   She return to work as mentor teacher in 10/2024.  She completed Rad Rx.   Skin at breast is pealing.    Due to begin herceptin q 3 weeks, 11/15/24, D1C1.  See Isabell 2 weeks, Check lab q 3 weeks.  D1C2 in 3 weeks 12/6/24.  Discussed following ECHO to check for cardiac toxicity 2nd Herceptin.  Current ECHO Ej Fx 65%.  Watch for diarrhea sx 2nd Herceptin, refill Lomotil qid prn.    Echocardiogram from November 20, 2024 shows an ejection fraction of 65%.  She returns to the office today.  She complains of intermittent chest tightness.  Also right-sided breast tenderness.  I placed an order for an EKG at [facility] and have asked her to see Dr. Derrick HATFIELD for evaluation of her symptoms.  She complains of easy fatigue, energy level is 5 to 7/10.  Her  reports energy level at 5/10.  She is mildly anemic and has an iron deficiency.  Stools for occult blood will be checked.  Will place her on iron infusions again to replenish iron stores and facilitate red blood cell production here.  Hemoglobin is 10.8 and ferritin is 17.  Iron infusion will be given at [facility].  She will follow up with myself in 4-6 weeks.    4/2/25 she complains of some breast pain.  Herceptin is on hold at this time, echocardiogram shows ejection fraction when from 60 down to 40%.  Echocardiogram will be followed.  A  "CAT scan from January showed some consolidation in the right upper lobe and right middle lobe with pleural thickening, which may be due to radiation change.  Stools for occult blood returned positive.  Will go for GI referral.    Discussed CT DNA monitoring for her breast cancer condition, lab was drawn and she will follow-up here in late May to review the results.  ROS:   GEN: normal without any fever, night sweats or weight loss  HEENT: normal with no HA's, sore throat, stiff neck, changes in vision  CV: normal with no CP, SOB, PND, CARNEY or orthopnea  PULM: normal with no SOB, cough, hemoptysis, sputum or pleuritic pain  GI: normal with no abdominal pain, nausea, vomiting, constipation, diarrhea, melanotic stools, BRBPR, or hematemesis  : normal with no hematuria, dysuria  BREAST: See HPI  SKIN: See HPI     Hx BTL, cholecystectomy.    Review of patient's allergies indicates:   Allergen Reactions    Carboplatin Other (See Comments)     Chest tightness and flushing       No meds.    Objective:   Vitals:  Blood pressure (!) 143/67, pulse 80, temperature 98.2 °F (36.8 °C), temperature source Temporal, resp. rate 16, height 5' 4" (1.626 m), weight 64.9 kg (143 lb), SpO2 98%.    Physical Examination:   GEN: no apparent distress, comfortable  HEAD: atraumatic and normocephalic  EYES: no pallor, no icterus  ENT: no pharyngeal erythema, external ears WNL; no nasal discharge  NECK: no masses, thyroid normal, trachea midline, no LAD/LN's, supple  CV: RRR with no murmur; normal pulse  CHEST: Normal respiratory effort; CTAB; normal breath sounds; no wheeze or crackles  ABDOM: nontender and nondistended; soft; normal bowel sounds; no rebound/guarding  MUSC/Skeletal: ROM normal; no crepitus; joints normal; no deformities or arthropathy  EXTREM: no clubbing, cyanosis, inflammation or swelling  SKIN: no rashes, lesions, ulcers, petechiae or subcutaneous nodules  : no cvat  NEURO: grossly intact; motor/sensory WNL; no " tremors  PSYCH: normal mood, affect and behavior  LYMPH: normal cervical, supraclavicular, and groin LN's  BREASTS:  Left breast is nontender, without discharge, and without palpable mass, left axilla is without palpable mass or lymphadenopathy.  R breast NT, incisions closed and healing, no palpable mass or lymphadenopathy.    MRI of the brain with contrast for staging will be done.  MRI of the left and right breast with contrast to check for a 2nd primary in the left breast, given her lobular features, will be done.  PET scan is being done for staging, to check for metastatic disease?    Assessment:   (1) 75 y.o. female abnormal mammogram and ultrasound with 4.8 cm mass noted at the anterior axilla and tell of the breast, also a large area of new micro calcifications are seen in the upper outer quadrant of the right breast, and a 1 cm nodule is noted at the 9 o'clock position of the right breast.    (2) biopsy of the 9:00 a.m. nodule and the anterior axillary mass returned showing invasive mixed ductal and lobular carcinoma, grade 2, ERP negative, PRP negative, HER2 Lisa 3+ positive.    (3) my impression at this time is that she is at least stage II.  Staging studies will include MRI with contrast of the brain, MRI with contrast of the left and right breast, PET scan will be done to check for metastatic disease.  CBC, CMP, breast tumor markers have been requested and echocardiogram for ejection fraction has been requested.    (4) because of the negative ERP and PRP studies, tamoxifen or Arimidex would not be expected to help adjuvantly in decreasing her long-term systemic recurrence risk.    (5) the strongly 3+ positive HER2 Lisa would support that neoadjuvant carboplatin, taxane, Herceptin, Perjeta q. 3 weeks times 4-6 cycle may achieve a CHRIS state at the breast and axillary area after treatment.    (6) she does have the large area microcalcifications at the right breast in addition to the 9:00 a.m. biopsy-proven  mass at the right breast.  The initial opinion as discussed with Dr. Villanueva, would be that she would require a right mastectomy and probably a right axillary node dissection, after the completion of neoadjuvant CTHP    (7) as discussed with Dr. Villanueva, she will probably need chest irradiation and right axillary radiation, after her definitive surgery.    (8) will forward a copy of this note to Dr. Treviño, she sees him on Wednesday.  Will ask Dr. Treviño to place a carisa catheter to facilitate systemic neoadjuvant treatment.    For now the PET scan is scheduled March 6 at 3:45 p.m..  Echocardiogram is scheduled at March 8th at 2:30 p.m..  MRI of brain is scheduled at March 8th at 3:00 p.m.  MRI of the breast is scheduled for March 21st at 3:15 p.m.  Iram is going to work on trying to get the MRI of the breast sooner.    (9) knowledge of her family history of cancer is incomplete.  A paternal aunt had breast cancer, a maternal uncle had cancer of unknown type, a sister gives a history of possible cancer.  I have requested BRCA 1 and BRCA 2 testing with an extended profile.    CTHP neoadjuvant Rx q 3 weeks x's 4-6 cycles.  D1C1 4/3/24.  At the end of Carboplatin infusion, she c/o chest pain.Isabell whitman NP saw her, medicated and sx resolved.  Suspected reaction to carboplatin, first infusion.    Cytoxan, Taxotere, Herceptin, Perjeta.  5/15/24.  Adjust dosages.    I think the hernia at the incision site where the gallbladder surgery was done can probably be repaired at a later time electively and does not appear to be emergency at this point.    She did have cataract surgery in January 2024 and is due to have some follow-up procedure done as part of that management.  She does have some blurriness of her vision.  I recommended that she defer the additional cataract procedure if feasible in the near future until we get her off of the chemotherapy and steroids and her other medications as these medicines may be contributing  "to her visual symptoms.    Hemoglobin is 8.7 and ferritin is 21.  She has iron deficiency anemia.  She did not tolerate oral iron supplementation.  Have discussed with her iron infusion.  I would plan to give her injectafer weekly x2 weeks.  Will premedicate her with Benadryl to reduce the risk of reaction to iron infusion.  I would estimate a 1-2% risk of reaction to iron infusion.  The iron infusion will be given at the [facility] outpatient Department clinic.  Iron administration should facilitate red blood cell production here and improve her hemoglobin up to or close to the normal range and should improve her energy level.      R partial mastectomy, SNB 7/30/24, microcalcification bx 8/13/24 CHRIS, no residual disease.  138#  5'5"    S/P Rad Rx.  Adjuvant anti H2N Rx x's 1 year.  Herceptin.  Monitor Ej Fx.    4/2/25  Echocardiogram from November shows good ejection fraction at 65%.  She is to see Dr. Meza for complaints of chest tightness evaluation.  Last Echo showed Ej Fx of 40%, hold herceptin and monitor with Echo.    She has mild anemia with iron deficiency, on iron infusions through [facility], stools for occult blood are heme +.  GI referral.    Discussed CT DNA monitoring of her breast cancer condition, lab was drawn, results are pending.    RTC 4 - 6 weeks.    Alexys Boyer MD  Heme Onc  4/2/25                                +                        "

## 2025-04-04 LAB
CANCER AG15-3 SERPL-ACNC: 17 U/ML (ref 0–25)
CANCER AG27-29 SERPL-ACNC: 26.2 U/ML (ref 0–38.6)

## 2025-04-14 ENCOUNTER — HOSPITAL ENCOUNTER (OUTPATIENT)
Dept: RADIOLOGY | Facility: HOSPITAL | Age: 75
Discharge: HOME | End: 2025-04-14
Attending: INTERNAL MEDICINE
Payer: MEDICARE

## 2025-04-14 ENCOUNTER — TELEPHONE (OUTPATIENT)
Dept: PULMONOLOGY | Facility: CLINIC | Age: 75
End: 2025-04-14
Payer: MEDICARE

## 2025-04-14 ENCOUNTER — RESULTS FOLLOW-UP (OUTPATIENT)
Dept: PULMONOLOGY | Facility: CLINIC | Age: 75
End: 2025-04-14

## 2025-04-14 DIAGNOSIS — R91.8 ABNORMAL CT LUNG SCREENING: ICD-10-CM

## 2025-04-14 PROCEDURE — 71250 CT THORAX DX C-: CPT | Mod: TC,PO

## 2025-04-14 PROCEDURE — 71250 CT THORAX DX C-: CPT | Mod: 26,,, | Performed by: RADIOLOGY

## 2025-04-14 NOTE — TELEPHONE ENCOUNTER
The patient's CT is markedly improved in the radiation field.  There is a new area in the RLL which is concerning.  Will repeat the CT again in 3 months.  Will need to make sure she is not aspirating.

## 2025-04-23 ENCOUNTER — TELEPHONE (OUTPATIENT)
Facility: CLINIC | Age: 75
End: 2025-04-23
Payer: MEDICARE

## 2025-04-23 ENCOUNTER — TELEPHONE (OUTPATIENT)
Dept: PULMONOLOGY | Facility: CLINIC | Age: 75
End: 2025-04-23
Payer: MEDICARE

## 2025-04-23 NOTE — TELEPHONE ENCOUNTER
----- Message from Shakira Frye MD sent at 4/21/2025 10:48 AM CDT -----  Please check with her and see if she may be aspirating.  Let her know the original area of concern is much improved.  Thanks  ----- Message -----  From: Colin, Rad Results In  Sent: 4/14/2025  10:11 AM CDT  To: Shakira Frye MD

## 2025-04-23 NOTE — TELEPHONE ENCOUNTER
----- Message from Sunni sent at 4/23/2025  8:41 AM CDT -----  The patient called to request an appointment with Isabell. She said her breast are sore and under her arms are hurting. She would like to be seen this week. Please call her back at [phone].

## 2025-04-25 ENCOUNTER — OFFICE VISIT (OUTPATIENT)
Facility: CLINIC | Age: 75
End: 2025-04-25
Payer: MEDICARE

## 2025-04-25 ENCOUNTER — TELEPHONE (OUTPATIENT)
Facility: CLINIC | Age: 75
End: 2025-04-25

## 2025-04-25 VITALS
BODY MASS INDEX: 24.5 KG/M2 | OXYGEN SATURATION: 99 % | TEMPERATURE: 97 F | HEART RATE: 78 BPM | HEIGHT: 64 IN | WEIGHT: 143.5 LBS | DIASTOLIC BLOOD PRESSURE: 70 MMHG | RESPIRATION RATE: 18 BRPM | SYSTOLIC BLOOD PRESSURE: 137 MMHG

## 2025-04-25 DIAGNOSIS — N64.4 BREAST PAIN, RIGHT: ICD-10-CM

## 2025-04-25 DIAGNOSIS — C50.911 INVASIVE DUCTAL CARCINOMA OF BREAST, FEMALE, RIGHT: Primary | ICD-10-CM

## 2025-04-25 PROCEDURE — 99999 PR PBB SHADOW E&M-EST. PATIENT-LVL IV: CPT | Mod: PBBFAC,,, | Performed by: NURSE PRACTITIONER

## 2025-04-25 RX ORDER — PANTOPRAZOLE SODIUM 40 MG/1
TABLET, DELAYED RELEASE ORAL
COMMUNITY
Start: 2025-04-21

## 2025-04-25 RX ORDER — AMOXICILLIN 500 MG/1
1000 TABLET, FILM COATED ORAL 2 TIMES DAILY
COMMUNITY
Start: 2025-04-21

## 2025-04-25 RX ORDER — CLARITHROMYCIN 500 MG/1
500 TABLET, FILM COATED ORAL 2 TIMES DAILY
COMMUNITY
Start: 2025-04-23

## 2025-04-25 RX ORDER — TRAMADOL HYDROCHLORIDE 50 MG/1
TABLET ORAL
Qty: 60 EACH | Refills: 0 | Status: SHIPPED | OUTPATIENT
Start: 2025-04-25

## 2025-04-25 NOTE — PROGRESS NOTES
[facility] HEMATOLGY ONCOLOGY CONSULTATION    Subjective:       Patient ID: Dora Atwood is a 75 y.o. female returning today for a regularly scheduled follow-up visit.    Chief Complaint: Breast Cancer  HER 2 +       HPI  The patient is here today by herself.     She had previously had a lumpectomy, XRT, and TCHP x 4 cycles. She was on herceptin post XRT. She has had 5 herceptin infusions alone post XRT, but did have a drop in EF and that has been discontinued.     Right breast soreness with right arm numbness and soreness and getting worse over the last month.   She did have a mammogram in February, but with the new pain will need to get earlier scan.     She is now established with [facility] due to Dr. Treviño retiring.     ECHO from 2/4/2025 does show a drop in EF from 65% to 40-45%. Her herceptin is on hold.       Past Medical History:   Diagnosis Date    Breast cancer 01/01/2024    Incisional hernia     from gallbladder surgery       Past Surgical History:   Procedure Laterality Date    BREAST LUMPECTOMY Right     CATARACT EXTRACTION Bilateral 01/2024    CHOLECYSTECTOMY  2022    INJECTION FOR SENTINEL NODE IDENTIFICATION Right 07/30/2024    Procedure: INJECTION, FOR SENTINEL NODE IDENTIFICATION;  Surgeon: Waleska Treviño MD;  Location: [facility] OR;  Service: General;  Laterality: Right;    INSERTION OF TUNNELED CENTRAL VENOUS CATHETER (CVC) WITH SUBCUTANEOUS PORT Right 03/19/2024    Procedure: INSERTION, PORT-A-CATH;  Surgeon: Waleska Treviño MD;  Location: [facility] OR;  Service: General;  Laterality: Right;    MASTECTOMY, PARTIAL Right 07/30/2024    Procedure: MASTECTOMY, PARTIAL;  Surgeon: Waleska Treviño MD;  Location: [facility] OR;  Service: General;  Laterality: Right;  SYDNIE AMILCAR (7/25)    TUBAL LIGATION  1981       Social History     Socioeconomic History    Marital status:    Tobacco Use    Smoking status: Never    Smokeless tobacco: Never   Substance and Sexual Activity    Alcohol use: Never    Drug use: Never     Sexual activity: Not Currently     Social Drivers of Health     Financial Resource Strain: Low Risk  (3/21/2024)    Overall Financial Resource Strain (CARDIA)     Difficulty of Paying Living Expenses: Not hard at all   Food Insecurity: No Food Insecurity (3/21/2024)    Hunger Vital Sign     Worried About Running Out of Food in the Last Year: Never true     Ran Out of Food in the Last Year: Never true   Transportation Needs: No Transportation Needs (3/21/2024)    PRAPARE - Transportation     Lack of Transportation (Medical): No     Lack of Transportation (Non-Medical): No   Physical Activity: Sufficiently Active (3/21/2024)    Exercise Vital Sign     Days of Exercise per Week: 6 days     Minutes of Exercise per Session: 30 min   Stress: Stress Concern Present (3/21/2024)    [employer] - Occupational Stress Questionnaire     Feeling of Stress : To some extent   Housing Stability: Low Risk  (3/21/2024)    Housing Stability Vital Sign     Unable to Pay for Housing in the Last Year: No     Number of Places Lived in the Last Year: 1     Unstable Housing in the Last Year: No       Family History   Problem Relation Name Age of Onset    Rectal cancer Mother      Breast cancer Maternal Aunt         Review of patient's allergies indicates:   Allergen Reactions    Carboplatin Other (See Comments)     Chest tightness and flushing       Current Outpatient Medications:     amoxicillin (AMOXIL) 500 MG Tab, Take 1,000 mg by mouth 2 (two) times daily., Disp: , Rfl:     clarithromycin (BIAXIN) 500 MG tablet, Take 500 mg by mouth 2 (two) times daily., Disp: , Rfl:     diphenoxylate-atropine 2.5-0.025 mg (LOMOTIL) 2.5-0.025 mg per tablet, Take 1 tablet by mouth 4 (four) times daily as needed for Diarrhea., Disp: 30 tablet, Rfl: 1    pantoprazole (PROTONIX) 40 MG tablet, SMARTSI Tablet(s) By Mouth Morning-Evening, Disp: , Rfl:     duke's soln (benadryl 30 mL, mylanta 30 mL, LIDOcaine 30 mL, nystatin 30  "mL) 120mL, Take 10 mLs by mouth 4 (four) times daily. (Patient not taking: Reported on 4/25/2025), Disp: 240 mL, Rfl: 1    promethazine (PHENERGAN) 25 MG tablet, Take 1 tablet (25 mg total) by mouth every 4 to 6 hours as needed for Nausea. (Patient not taking: Reported on 4/25/2025), Disp: 30 tablet, Rfl: 2    traMADoL (ULTRAM) 50 mg tablet, Take 1-2 tablets every 8 hrs prn pain, Disp: 60 each, Rfl: 0    All medications and past history have been reviewed.    Review of Systems   Constitutional:  Negative for appetite change and unexpected weight change.   HENT:  Negative for mouth sores.    Eyes:  Negative for visual disturbance.   Respiratory:  Negative for cough and shortness of breath.    Cardiovascular:  Negative for chest pain.   Gastrointestinal:  Negative for abdominal pain and diarrhea.   Genitourinary:  Negative for frequency.   Musculoskeletal:  Negative for back pain.   Skin:  Negative for rash.        Right breast pain     Neurological:  Negative for headaches.   Hematological:  Negative for adenopathy.   Psychiatric/Behavioral:  The patient is not nervous/anxious.        Objective:        /70   Pulse 78   Temp 97.3 °F (36.3 °C) (Temporal)   Resp 18   Ht 5' 4" (1.626 m)   Wt 65.1 kg (143 lb 8 oz)   SpO2 99%   BMI 24.63 kg/m²     Physical Exam  Constitutional:       Appearance: Normal appearance.   HENT:      Head: Normocephalic and atraumatic.      Mouth/Throat:      Mouth: Mucous membranes are moist.   Cardiovascular:      Rate and Rhythm: Normal rate and regular rhythm.      Pulses: Normal pulses.      Heart sounds: Normal heart sounds.   Pulmonary:      Effort: Pulmonary effort is normal. No respiratory distress.      Breath sounds: Normal breath sounds. No wheezing.   Chest:   Breasts:     Right: Inverted nipple, skin change and tenderness present.      Left: Inverted nipple present.   Abdominal:      General: There is no distension.      Palpations: Abdomen is soft. There is no mass.    "   Tenderness: There is no abdominal tenderness.   Musculoskeletal:         General: No swelling. Normal range of motion.      Right lower leg: No edema.      Left lower leg: No edema.   Skin:     General: Skin is warm and dry.      Capillary Refill: Capillary refill takes 2 to 3 seconds.      Findings: No bruising or rash.   Neurological:      Mental Status: She is alert and oriented to person, place, and time. Mental status is at baseline.      Motor: No weakness.   Psychiatric:         Mood and Affect: Mood normal.         Behavior: Behavior normal.           Lab:    Component  Ref Range & Units (hover) 3 wk ago 9 mo ago 1 yr ago   WBC 5.14 4.19 6.36   RBC 4.31 4.16 3.79 Low    Hgb 12.7 12.3 R 11.9 Low  R   Hct 39.2 39.2 37.4   MCV 91 94 99 High    MCH 29.5 29.6 31.4 High    MCHC 32.4 31.4 Low  31.8 Low    RDW 15.3 High  15.3 High  13.1   Platelet Count 244 261 292   MPV 8.6 Low  8.7 Low  8.8 Low    Nucleated RBC 0 0 R    Neut % 75.1 High      Lymph % 12.1 Low      Mono % 8.9 9.1 R    Eos % 2.7 3.3 R    Basophil % 1.0 0.7 R    Imm Grans % 0.2 0.2    Neut # 3.9     Lymph # 0.62 Low  0.7 Low  R    Mono # 0.46 0.4 R    Eos # 0.14 0.1 R    Baso # 0.05 0.03 R    Imm Grans # 0.01 0.01 CM    Comment: Mild elevation in immature granulocytes is non specific and can be seen in a variety of conditions including stress response, acute inflammation, trauma and pregnancy. Correlation with other laboratory and clinical findings is essential.   [facility] SML[facility] SML     Component  Ref Range & Units (hover) 3 wk ago  (4/2/25) 9 mo ago  (7/26/24) 1 yr ago  (3/15/24) 1 yr ago  (3/15/24) 1 yr ago  (3/14/24)   Sodium 139 141   142   Potassium 3.7 4.0   3.8   Chloride 102 105   105   CO2 29 27   32 High    Glucose 85 85   116 High    BUN 13 13   16   Creatinine 0.6 0.6 0.7 0.7 0.7   Calcium 9.3 9.4   9.0   Protein Total 7.2       Albumin 4.0       Bilirubin Total 0.3       Comment: For infants and newborns, interpretation of  results should be based  on gestational age, weight and in agreement with clinical  observations.    Premature Infant recommended reference ranges:  0-24 hours:  <8.0 mg/dL  24-48 hours: <12.0 mg/dL  3-5 days:    <15.0 mg/dL  6-29 days:   <15.0 mg/dL          AST 16       ALT 14       Anion Gap 8 9   5 Low    eGFR >60         Cancer Antigen 27-29  Order: [id number]   Status: Final result       Next appt: 05/05/2025 at 09:00 AM in Chemotherapy (INJECTION CHAIR 01)       Dx: Iron deficiency anemia due to chronic...    Test Result Released: Yes (seen)    0 Result Notes      Component  Ref Range & Units (hover) 3 wk ago   CA 27-29 26.2   Comment: Siemens Centaur Immunochemiluminometric Methodology (ICMA)  Values obtained with different assay methods or kits cannot be used  interchangeably. Results cannot be interpreted as absolute evidence  of the presence or absence of malignant disease.   Resulting Agency [facility]              Narrative  Performed by: [facility]  Performed at:  [address] [place] [address]  [id number]  : Doug Swift MD, Phone:  [phone]   Specimen Collected: 04/02/25 15:35 CDT Last Resulted: 04/04/25 11:36 CDT        Lab Flowsheet          Order Details          View Encounter          Lab and Collection Details          Routing          Result History       View All Conversations on this Encounter         MyChart Results Release    MyChart Status: Active  Results Release      Cancer Antigen 15-3  Order: [id number]   Status: Final result       Next appt: 05/05/2025 at 09:00 AM in Chemotherapy (INJECTION CHAIR 01)       Dx: Iron deficiency anemia due to chronic...    Test Result Released: Yes (seen)    0 Result Notes      Component  Ref Range & Units (hover) 3 wk ago   CA 15-3 17.0   Comment: Roche [employer] Electrochemiluminescence Immunoassay (ECLIA)  Values obtained with different assay methods or kits cannot be  used  interchangeably.  Results cannot be interpreted as absolute  evidence of the presence or absence of malignant disease.   Resulting Agency [facility]              Narrative  Performed by: [facility]  Performed at:  01 - [facility] [address]  [id number]  : Doug Swift MD, Phone:  [phone]       Radiology/Diagnostic Studies:    ECHO 2/4/2025:     Left Ventricle: The left ventricle is normal in size. Normal wall thickness. Mild global hypokinesis present. There is mildly reduced systolic function with a visually estimated ejection fraction of 40 - 45%. There is normal diastolic function.    Right Ventricle: Systolic function is normal.    Right Atrium: Right atrium is mildly dilated.    Tricuspid Valve: There is mild regurgitation.    Pulmonary Artery: The estimated pulmonary artery systolic pressure is 30 mmHg.    IVC/SVC: Normal venous pressure at 3 mmHg.      Mammogram and US 2/7/2025  Impression:     Stable postsurgical changes in the upper outer right breast with removal of the suspicious calcifications.  There is mild skin thickening on the right compatible with postradiation changes     No mammographic abnormality in the left breast     4 mm benign-appearing lymph node in the right axilla with no additional adenopathy     Recommendation: Follow-up right mammogram in 6 months is recommended.     BI-RADS CATEGORY: 3  PROBABLY BENIGN FINDING - SHORT TERM INTERVAL FOLLOWUP SUGGESTED.     Technologist: MELISSA        Electronically signed by:Dianna Pires  Date:                                            02/07/2025  Time:                                           12:01    All lab results and imaging results have been reviewed and discussed with the patient.   Assessment/Plan:       1. Invasive ductal carcinoma of breast, female, right  Assessment & Plan:  Off treatment   Had to DC herceptin due drop in EF   Ct dna monitoring       Orders:  -      MRI Breast w/o Contrast, Bilateral; Future; Expected date: 04/25/2025    2. Breast pain, right  Assessment & Plan:  Mammogram and US reviewed with patient   MRI breast ordered today due to new breast pain and tightness   Referral to PT if the MRI of the breast is clear   Tramadol sent to pharmacy for pain   She cannot take ibprofen due to gastric indications    Orders:  -     MRI Breast w/o Contrast, Bilateral; Future; Expected date: 04/25/2025  -     traMADoL (ULTRAM) 50 mg tablet; Take 1-2 tablets every 8 hrs prn pain  Dispense: 60 each; Refill: 0             PLAN:   Hold herceptin due to drop in EF -   Following up with Dr. Rehman as needed  Mammogram and US reviewed repeat right breast mammogram in 6 months- will order breast MRI due to new onset pain and tightness   She did have her colonscopy and EGD with Dr. Lara   Talked about risk factors for BRCA 2   Pending CT DNA results      Cancer Staging   Invasive ductal carcinoma of breast, female, right  Staging form: Breast, AJCC 8th Edition  - Clinical: Stage IIIB (cT1b, cN3c, cM0, G2, ER-, TN-, HER2+) - Signed by Meño Villanueva Jr., MD on 7/1/2024  - Pathologic: ypT0, pN0(sn), cM0 - Signed by Meño Villanueva Jr., MD on 11/12/2024        I have explained and the patient understands all of  the current recommendation(s). I have answered all of their questions to the best of my ability and to their complete satisfaction.   The patient is to continue with the current management plan.            Electronically signed by: Isabell Gayle, MSN, APRN, AGNP-C

## 2025-04-25 NOTE — ASSESSMENT & PLAN NOTE
Mammogram and US reviewed with patient   MRI breast ordered today due to new breast pain and tightness   Referral to PT if the MRI of the breast is clear   Tramadol sent to pharmacy for pain   She cannot take ibprofen due to gastric indications

## 2025-04-25 NOTE — TELEPHONE ENCOUNTER
----- Message from Sloane sent at 4/25/2025  1:23 PM CDT -----  Cannot do more than a 7 day supply if they do not have a reason...[facility] policy, for patient safetytraMADoL (ULTRAM) 50 mg tabletPlease call the pharmacy... to adjust or give reasons for the amount of dosage or to change the dispensation.Please call back before this office closes today, per the caller.[phone] [address] [place] [address], [place] - [address]      Message sent to Dr. Boyer for clarification.

## 2025-05-05 ENCOUNTER — INFUSION (OUTPATIENT)
Dept: INFUSION THERAPY | Facility: HOSPITAL | Age: 75
End: 2025-05-05
Attending: INTERNAL MEDICINE
Payer: MEDICARE

## 2025-05-05 VITALS
BODY MASS INDEX: 24.42 KG/M2 | HEIGHT: 64 IN | RESPIRATION RATE: 18 BRPM | HEART RATE: 77 BPM | SYSTOLIC BLOOD PRESSURE: 143 MMHG | DIASTOLIC BLOOD PRESSURE: 84 MMHG | TEMPERATURE: 98 F | WEIGHT: 143.06 LBS | OXYGEN SATURATION: 99 %

## 2025-05-05 DIAGNOSIS — C50.911 INVASIVE DUCTAL CARCINOMA OF BREAST, FEMALE, RIGHT: Primary | ICD-10-CM

## 2025-05-05 DIAGNOSIS — N64.4 BREAST PAIN, RIGHT: ICD-10-CM

## 2025-05-05 PROCEDURE — 25000003 PHARM REV CODE 250: Performed by: INTERNAL MEDICINE

## 2025-05-05 PROCEDURE — A4216 STERILE WATER/SALINE, 10 ML: HCPCS | Performed by: INTERNAL MEDICINE

## 2025-05-05 PROCEDURE — 63600175 PHARM REV CODE 636 W HCPCS: Performed by: INTERNAL MEDICINE

## 2025-05-05 PROCEDURE — 96523 IRRIG DRUG DELIVERY DEVICE: CPT

## 2025-05-05 RX ORDER — HEPARIN 100 UNIT/ML
500 SYRINGE INTRAVENOUS
Status: DISCONTINUED | OUTPATIENT
Start: 2025-05-05 | End: 2025-05-05 | Stop reason: HOSPADM

## 2025-05-05 RX ORDER — HEPARIN 100 UNIT/ML
500 SYRINGE INTRAVENOUS
OUTPATIENT
Start: 2025-05-05

## 2025-05-05 RX ORDER — TRAMADOL HYDROCHLORIDE 50 MG/1
TABLET ORAL
Qty: 60 EACH | Refills: 0 | Status: SHIPPED | OUTPATIENT
Start: 2025-05-05

## 2025-05-05 RX ORDER — SODIUM CHLORIDE 0.9 % (FLUSH) 0.9 %
10 SYRINGE (ML) INJECTION
OUTPATIENT
Start: 2025-05-05

## 2025-05-05 RX ORDER — SODIUM CHLORIDE 0.9 % (FLUSH) 0.9 %
10 SYRINGE (ML) INJECTION
Status: DISCONTINUED | OUTPATIENT
Start: 2025-05-05 | End: 2025-05-05 | Stop reason: HOSPADM

## 2025-05-05 RX ADMIN — SODIUM CHLORIDE, PRESERVATIVE FREE 10 ML: 5 INJECTION INTRAVENOUS at 09:05

## 2025-05-05 RX ADMIN — HEPARIN 500 UNITS: 100 SYRINGE at 09:05

## 2025-05-05 NOTE — TELEPHONE ENCOUNTER
----- Message from Med Assistant Ethel sent at 5/5/2025  9:17 AM CDT -----  Regarding: RX UPDATE  Pt came into office asking about her traMADoL (ULTRAM) 50 mg tablet, pharmacy was supposed to reach out to Isabell for update. Please call pt with update about her medication being sent to the pharmacy. Thank you

## 2025-05-05 NOTE — TELEPHONE ENCOUNTER
Spoke to [facility] in [place].  She said that Patient is Opoid Névé and required a lesser dose per her insurance.  Insurance required the RX to state Take One (1) tab q 8 hrs for 7 days only.  YOVANY Whyte made aware.  She asked that her RX be sent to another Pharmacy.  This RN spoke to Patient. She agreed to  pick it up in [place] at [facility].  YOVANY Whyte made aware.

## 2025-05-05 NOTE — PLAN OF CARE
Problem: Fatigue  Goal: Improved Activity Tolerance  Outcome: Progressing  Intervention: Promote Improved Energy  Flowsheets (Taken 5/5/2025 5506)  Fatigue Management: paced activity encouraged  Sleep/Rest Enhancement: regular sleep/rest pattern promoted  Activity Management: Ambulated -L4  Environmental Support: environmental consistency promoted

## 2025-05-07 ENCOUNTER — OFFICE VISIT (OUTPATIENT)
Dept: RADIATION ONCOLOGY | Facility: CLINIC | Age: 75
End: 2025-05-07
Payer: MEDICARE

## 2025-05-07 VITALS
DIASTOLIC BLOOD PRESSURE: 89 MMHG | HEART RATE: 81 BPM | SYSTOLIC BLOOD PRESSURE: 153 MMHG | OXYGEN SATURATION: 91 % | WEIGHT: 145.31 LBS | BODY MASS INDEX: 24.94 KG/M2 | TEMPERATURE: 98 F

## 2025-05-07 DIAGNOSIS — C50.911 INVASIVE DUCTAL CARCINOMA OF BREAST, FEMALE, RIGHT: Primary | ICD-10-CM

## 2025-05-07 PROCEDURE — 3077F SYST BP >= 140 MM HG: CPT | Mod: CPTII,S$GLB,, | Performed by: RADIOLOGY

## 2025-05-07 PROCEDURE — G2211 COMPLEX E/M VISIT ADD ON: HCPCS | Mod: S$GLB,,, | Performed by: RADIOLOGY

## 2025-05-07 PROCEDURE — 99215 OFFICE O/P EST HI 40 MIN: CPT | Mod: S$GLB,,, | Performed by: RADIOLOGY

## 2025-05-07 PROCEDURE — 1159F MED LIST DOCD IN RCRD: CPT | Mod: CPTII,S$GLB,, | Performed by: RADIOLOGY

## 2025-05-07 PROCEDURE — 3079F DIAST BP 80-89 MM HG: CPT | Mod: CPTII,S$GLB,, | Performed by: RADIOLOGY

## 2025-05-07 PROCEDURE — 1160F RVW MEDS BY RX/DR IN RCRD: CPT | Mod: CPTII,S$GLB,, | Performed by: RADIOLOGY

## 2025-05-07 PROCEDURE — 1101F PT FALLS ASSESS-DOCD LE1/YR: CPT | Mod: CPTII,S$GLB,, | Performed by: RADIOLOGY

## 2025-05-07 PROCEDURE — 1126F AMNT PAIN NOTED NONE PRSNT: CPT | Mod: CPTII,S$GLB,, | Performed by: RADIOLOGY

## 2025-05-07 PROCEDURE — 3008F BODY MASS INDEX DOCD: CPT | Mod: CPTII,S$GLB,, | Performed by: RADIOLOGY

## 2025-05-07 PROCEDURE — 3288F FALL RISK ASSESSMENT DOCD: CPT | Mod: CPTII,S$GLB,, | Performed by: RADIOLOGY

## 2025-05-07 NOTE — PROGRESS NOTES
Dora Atwood  [id number]  1950  5/7/2025  No referring provider defined for this encounter.    DIAGNOSIS:  Cancer Staging   Invasive ductal carcinoma of breast, female, right  Staging form: Breast, AJCC 8th Edition  - Clinical: Stage IIIB (cT1b, cN3c, cM0, G2, ER-, FL-, HER2+) - Signed by Meño Villanueva Jr., MD on 7/1/2024  - Pathologic: ypT0, pN0(sn), cM0 - Signed by Meño Villanueva Jr., MD on 11/12/2024    REASON FOR VISIT: Routine scheduled follow-up.    HISTORY OF PRESENT ILLNESS:   Dora Atwood is a 75 y.o. post-menopausal female with (+; paternal aunt) family history of breast cancer presented with palpable mass in the right axilla with diagnostic mammogram noting pleomorphic microcalcifications throughout the upper outer quadrant of the right breast covering 4 x 8 cm region of the large right axillary mass measuring 4.5 cm.  Ultrasound confirmed a 1 cm spiculated hypoechoic nodule in the right breast at 9:00, numerous skin based small hypoechoic nodules, 4.8 cm right axillary lymph node with an adjacent 2 x 3 cm lymph node.  Core needle biopsy returned g2 (6-7/9) invasive carcinoma with ductal and lobular features with associated grade 2 DCIS.  Biopsy from the right axilla returned metastatic carcinoma.  Tumor was ER negative, FL negative, HER2 3+.    She presented to [facility] at [facility] to meet with Dr. Brooks (Medical Oncology) and myself (Radiation Oncology) to formulate treatment plan, 03/04/2024.  She sees Dr. Treviño of General surgery.  Consensus recommendation was to proceed with BRCA testing, MRI breast, PET.     BRCA2(+), MIKY(+).    MRI breast measured the right breast mass with minimal enhancement at 0.9cm with 7.1 cm bulky right axillary necrotic lymphadenopathy.  There was no multifocal or contralateral disease.  PET confirmed uptake within the right breast nodule, right supraclavicular, right infraclavicular, right internal mammary and right axillary lymph  node chains with no other sites of disease.  Contrasted CT of the head was negative.    She completed TCHP x4/6 cycles planned.  PET is now negative for uptake.  Patient not planning further chemotherapy and will follow up with Dr. Treviño to discuss surgery.    Lumpectomy: ypT0N0(sn)  Follow-up Ca++ bx: CHRIS    Completed adjuvant radiotherapy to the right breast and draining lymphatics, 5040 cGy followed by 1000 cGy boost to lumpectomy cavity ending October 21, 2024.  Treatment well tolerated with mild fatigue and expected radiation dermatitis.    Placed on adjuvant Herceptin.    INTERVAL HISTORY:   Herceptin held due to drop in EF on echo.  She is following with Dr. Frye of pulmonology for right lung findings (CT C report below).  Today she reports mild sensitivity of the right breast and tightness in the right axilla hindering range of motion.  She has been referred to PT by YOVANY Ho.  Denies fever, chills, chest pain, shortness of breath, cough or hemoptysis.  Denies coughing after eating or drinking.    Review of systems otherwise negative unless indicated in HPI/interval history.    Past Medical History:   Diagnosis Date    Breast cancer 01/01/2024    Incisional hernia     from gallbladder surgery     Past Surgical History:   Procedure Laterality Date    BREAST LUMPECTOMY Right     CATARACT EXTRACTION Bilateral 01/2024    CHOLECYSTECTOMY  2022    INJECTION FOR SENTINEL NODE IDENTIFICATION Right 07/30/2024    Procedure: INJECTION, FOR SENTINEL NODE IDENTIFICATION;  Surgeon: Waleska Treviño MD;  Location: [facility];  Service: General;  Laterality: Right;    INSERTION OF TUNNELED CENTRAL VENOUS CATHETER (CVC) WITH SUBCUTANEOUS PORT Right 03/19/2024    Procedure: INSERTION, PORT-A-CATH;  Surgeon: Waleska Treviño MD;  Location: [facility] OR;  Service: General;  Laterality: Right;    MASTECTOMY, PARTIAL Right 07/30/2024    Procedure: MASTECTOMY, PARTIAL;  Surgeon: Waleska Treviño MD;  Location: [facility] OR;  Service: General;   Laterality: Right;  SYDNIE  ()    TUBAL LIGATION       Social History     Socioeconomic History    Marital status:    Tobacco Use    Smoking status: Never    Smokeless tobacco: Never   Substance and Sexual Activity    Alcohol use: Never    Drug use: Never    Sexual activity: Not Currently     Social Drivers of Health     Financial Resource Strain: Low Risk  (3/21/2024)    Overall Financial Resource Strain (CARDIA)     Difficulty of Paying Living Expenses: Not hard at all   Food Insecurity: No Food Insecurity (3/21/2024)    Hunger Vital Sign     Worried About Running Out of Food in the Last Year: Never true     Ran Out of Food in the Last Year: Never true   Transportation Needs: No Transportation Needs (3/21/2024)    PRAPARE - Transportation     Lack of Transportation (Medical): No     Lack of Transportation (Non-Medical): No   Physical Activity: Sufficiently Active (3/21/2024)    Exercise Vital Sign     Days of Exercise per Week: 6 days     Minutes of Exercise per Session: 30 min   Stress: Stress Concern Present (3/21/2024)    [employer] - Occupational Stress Questionnaire     Feeling of Stress : To some extent   Housing Stability: Low Risk  (3/21/2024)    Housing Stability Vital Sign     Unable to Pay for Housing in the Last Year: No     Number of Places Lived in the Last Year: 1     Unstable Housing in the Last Year: No     Family History   Problem Relation Name Age of Onset    Rectal cancer Mother      Breast cancer Maternal Aunt       Medication List with Changes/Refills   Current Medications    AMOXICILLIN (AMOXIL) 500 MG TAB    Take 1,000 mg by mouth 2 (two) times daily.    CLARITHROMYCIN (BIAXIN) 500 MG TABLET    Take 500 mg by mouth 2 (two) times daily.    DIPHENOXYLATE-ATROPINE 2.5-0.025 MG (LOMOTIL) 2.5-0.025 MG PER TABLET    Take 1 tablet by mouth 4 (four) times daily as needed for Diarrhea.    PANTOPRAZOLE (PROTONIX) 40 MG TABLET    SMARTSI Tablet(s) By  Mouth Morning-Evening    TRAMADOL (ULTRAM) 50 MG TABLET    Take 1 to 2 tablets every 8 hours as needed for pain by mouth   Discontinued Medications    DUKE'S SOLN (BENADRYL 30 ML, MYLANTA 30 ML, LIDOCAINE 30 ML, NYSTATIN 30 ML) 120ML    Take 10 mLs by mouth 4 (four) times daily.    PROMETHAZINE (PHENERGAN) 25 MG TABLET    Take 1 tablet (25 mg total) by mouth every 4 to 6 hours as needed for Nausea.     Review of patient's allergies indicates:   Allergen Reactions    Carboplatin Other (See Comments)     Chest tightness and flushing       QUALITY OF LIFE: 90%- Able to Carry on Normal Activity: Minor Symptoms of Disease    Vitals:    05/07/25 1321   BP: (!) 153/89   Pulse: 81   Temp: 97.9 °F (36.6 °C)   SpO2: (!) 91%   Weight: 65.9 kg (145 lb 4.8 oz)   PainSc: 0-No pain     Body mass index is 24.94 kg/m².    PHYSICAL EXAM:   GENERAL: alert; in no apparent distress.   HEAD: normocephalic, atraumatic.  Hair regrowth  EYES: pupils are equal, round, reactive to light and accommodation. Sclera anicteric. Conjunctiva not injected.   NOSE/THROAT: no nasal erythema or rhinorrhea. Oropharynx pink, without erythema, ulcerations or thrush.   NECK: no cervical motion rigidity; supple with no masses.  CHEST: Patient is speaking comfortably on room air with normal work of breathing without using accessory muscles of respiration.  CARDIOVASCULAR: regular rate and rhythm  ABDOMEN: soft, nontender, nondistended.   MUSCULOSKELETAL: no tenderness to palpation along the spine or scapulae. Normal range of motion.  NEUROLOGIC: cranial nerves II-XII intact bilaterally. Strength 5/5 in bilateral upper and lower extremities. No sensory deficits appreciated. Normal gait.  LYMPHATIC: no left axillary adenopathy appreciated.   EXTREMITIES: no clubbing, cyanosis, edema.  SKIN: no erythema, rashes, ulcerations noted.   BREAST:  Minimal fibrosis concentrated to retroareolar space.  No palpable seroma, nodularity, cellulitis or fluctuance.  Nontender  exam.  Two small circles of hyperpigmentation, chronic from prior tape sites.    ANCILLARY DATA: All images reviewed personally by dictating physician.  4/14/25 CT C  Impression:  1.  Improving/resolving subpleural airspace opacities in the anterior right lung in keeping with a history of prior radiation treatment/change.  2.  Mild to moderate multifocal consolidative airspace opacities at the right lung base, with a somewhat atoll like appearance, while these may represent foci of aspiration/pneumonia, or radiation pneumonitis, cryptogenic organizing pneumonia is a consideration given the appearance, noting malignancy is not excluded, and correlation with the symptoms, history and interval follow-up would be warranted.     2/7/25 MMG/US  Impression:  Stable postsurgical changes in the upper outer right breast with removal of the suspicious calcifications.  There is mild skin thickening on the right compatible with postradiation changes  No mammographic abnormality in the left breast  4 mm benign-appearing lymph node in the right axilla with no additional adenopathy  Recommendation: Follow-up right mammogram in 6 months is recommended.  BI-RADS CATEGORY: 3  PROBABLY BENIGN FINDING - SHORT TERM INTERVAL FOLLOWUP SUGGESTED.    ASSESSMENT: Dora Atwood is a female with stage  IIIB, [id number] g2 IDC/ILC of UOQ R breast, ER(-)/OR(-)/HER2(3+); ypT0N0  PLAN:     - RT well tolerated.  Reiterated importance of massages using moisturizer containing vitamin E with range of motion exercises of right upper extremity.  Proceed with PT sintia per NP Vic.  - CHRIS   - Studies: MRI breast pending  - Follow up with Dr. Brooks  - Follow up with Dr. Frye.  CT reviewed with patient today.  Radiation pneumonitis along right anterior pleura typical, asymptomatic.  Disease in the base of the lung appears consistent with pneumonia the patient denies recent infection or aspiration.  Agree with serial scans to monitor for resolution.  -  Return to clinic 6mos.    All questions answered and contact information provided. Patient understands free to call us anytime with any questions or concerns regarding radiation therapy.    I have personally seen and evaluated this patient with a moderate to high complexity diagnosis.      45 minutes were dedicated to reviewing/interpreting pertinent laboratory/imaging/pathology as well as follow-up with concurrent consultants; reviewing and performing history and physical; counseling patient on continuing oncologic recommendations; documentation in the electronic medical record including ordering of additional tests and/or radiation treatment protocol; and coordination of care with physicians with referrals placed as appropriate.    PHYSICIAN: Meño Villanueva Jr, MD

## 2025-05-08 DIAGNOSIS — Z15.09 BRCA POSITIVE: ICD-10-CM

## 2025-05-08 DIAGNOSIS — C50.911 INVASIVE DUCTAL CARCINOMA OF BREAST, FEMALE, RIGHT: ICD-10-CM

## 2025-05-08 DIAGNOSIS — Z15.01 BRCA POSITIVE: ICD-10-CM

## 2025-05-08 DIAGNOSIS — N64.4 BREAST PAIN, RIGHT: Primary | ICD-10-CM

## 2025-05-13 ENCOUNTER — HOSPITAL ENCOUNTER (OUTPATIENT)
Dept: RADIOLOGY | Facility: HOSPITAL | Age: 75
Discharge: HOME | End: 2025-05-13
Attending: NURSE PRACTITIONER
Payer: MEDICARE

## 2025-05-13 ENCOUNTER — TELEPHONE (OUTPATIENT)
Facility: CLINIC | Age: 75
End: 2025-05-13
Payer: MEDICARE

## 2025-05-13 DIAGNOSIS — Z15.09 BRCA POSITIVE: ICD-10-CM

## 2025-05-13 DIAGNOSIS — C50.911 INVASIVE DUCTAL CARCINOMA OF BREAST, FEMALE, RIGHT: ICD-10-CM

## 2025-05-13 DIAGNOSIS — Z15.01 BRCA POSITIVE: ICD-10-CM

## 2025-05-13 DIAGNOSIS — N64.4 BREAST PAIN, RIGHT: ICD-10-CM

## 2025-05-13 RX ORDER — GADOBUTROL 604.72 MG/ML
6.5 INJECTION INTRAVENOUS
Status: DISCONTINUED | OUTPATIENT
Start: 2025-05-13 | End: 2025-05-14 | Stop reason: HOSPADM

## 2025-05-13 NOTE — TELEPHONE ENCOUNTER
Just spoke with Patient. She was not able to get her MRI/Breast today. They stuck her 4 times and were not able to get a vein. She asked that they reschedule. She could only take 4 sticks at a time. She is looking at her calendar. They are traveling out of the country soon and she will schedule for after she has returns. I informed her I could set up an appointment with [facility] for an US guided IV prior to her MRI.  Patient was very grateful and verbally demonstrated understanding of POC.

## 2025-05-14 ENCOUNTER — OFFICE VISIT (OUTPATIENT)
Facility: CLINIC | Age: 75
End: 2025-05-14
Payer: MEDICARE

## 2025-05-14 VITALS
HEIGHT: 64 IN | OXYGEN SATURATION: 97 % | TEMPERATURE: 98 F | BODY MASS INDEX: 24.69 KG/M2 | DIASTOLIC BLOOD PRESSURE: 71 MMHG | RESPIRATION RATE: 18 BRPM | SYSTOLIC BLOOD PRESSURE: 126 MMHG | WEIGHT: 144.63 LBS | HEART RATE: 79 BPM

## 2025-05-14 DIAGNOSIS — R53.81 PHYSICAL DECONDITIONING: Primary | ICD-10-CM

## 2025-05-14 DIAGNOSIS — R92.8 ABNORMAL MAMMOGRAM OF RIGHT BREAST: ICD-10-CM

## 2025-05-14 DIAGNOSIS — C50.911 INVASIVE DUCTAL CARCINOMA OF BREAST, FEMALE, RIGHT: ICD-10-CM

## 2025-05-14 PROCEDURE — 99214 OFFICE O/P EST MOD 30 MIN: CPT | Mod: S$GLB,,, | Performed by: INTERNAL MEDICINE

## 2025-05-14 PROCEDURE — 3074F SYST BP LT 130 MM HG: CPT | Mod: CPTII,S$GLB,, | Performed by: INTERNAL MEDICINE

## 2025-05-14 PROCEDURE — 99999 PR PBB SHADOW E&M-EST. PATIENT-LVL IV: CPT | Mod: PBBFAC,,, | Performed by: INTERNAL MEDICINE

## 2025-05-14 PROCEDURE — 1126F AMNT PAIN NOTED NONE PRSNT: CPT | Mod: CPTII,S$GLB,, | Performed by: INTERNAL MEDICINE

## 2025-05-14 PROCEDURE — 3288F FALL RISK ASSESSMENT DOCD: CPT | Mod: CPTII,S$GLB,, | Performed by: INTERNAL MEDICINE

## 2025-05-14 PROCEDURE — 1101F PT FALLS ASSESS-DOCD LE1/YR: CPT | Mod: CPTII,S$GLB,, | Performed by: INTERNAL MEDICINE

## 2025-05-14 PROCEDURE — 3008F BODY MASS INDEX DOCD: CPT | Mod: CPTII,S$GLB,, | Performed by: INTERNAL MEDICINE

## 2025-05-14 PROCEDURE — G2211 COMPLEX E/M VISIT ADD ON: HCPCS | Mod: S$GLB,,, | Performed by: INTERNAL MEDICINE

## 2025-05-14 PROCEDURE — 3078F DIAST BP <80 MM HG: CPT | Mod: CPTII,S$GLB,, | Performed by: INTERNAL MEDICINE

## 2025-05-14 PROCEDURE — 1159F MED LIST DOCD IN RCRD: CPT | Mod: CPTII,S$GLB,, | Performed by: INTERNAL MEDICINE

## 2025-05-14 NOTE — PROGRESS NOTES
[facility] [address] Hematology Oncology Subsequent  encounter note    5/14/25    Subjective:      Patient ID:   Dora Atwood  75 y.o. female  1950  Waleska Plasencia Ed Mannina, MD's      Chief Complaint:   R breast Cancer    HPI:  She returns to the office for a scheduled appointment May 13, 2025.  Will arrange for PT evaluation for mobility and strengthening at the right arm area at [facility].  CT DNA studies are being done and should be available around early July 2025.  Colonoscopy was done per Dr. Lara April 22, 2025, a polyp was removed, study will be repeated in 5 years.  Hemoglobin is 12.7 and ferritin is 131, she is not iron deficient.  Traveling to [place] May 26 through June 3rd.  Due for right mammogram.  75 y.o. female palpated a hard lump at the area of the tail of the right breast and the anterior right axilla.  The mass was hard, large, but movable and not fixed.  Dr. Meza, her PMD ordered a bilateral diagnostic mammogram and ultrasound.      She has inversion of the left and right nipple, she says this is a chronic finding and her breasts have always been with the inversion.  Pleomorphic microcalcifications extending from the upper central to the upper outer quadrant of the right breast are noted and cover an area of approximately 4 x 8 cm, these are new from prior studies.  The left breast shows no masses or microcalcifications.  There is a large mass at the right axilla measuring 4.5 cm in diameter.      Ultrasound of right breast and axilla show a 1 cm mass 3 cm from the nipple at the 9 o'clock position.  In the skin of the lateral and inferior right breast there are numerous small hypoechoic nodules on the order of 3-6 mm in size.  Ultrasound of the axilla shows a large mass with irregular borders at 3 x 4.8 cm.  Just lateral to this mass is a smaller mass at 3 x 2 cm.  The radiologist recommends biopsy of the nodule at 9:00 a.m. and the large right  axillary mass presumed to be a metastatic lymph node.  He notes if the biopsy of the right breast nodule fails to demonstrate concordant histologic diagnosis, then stereotactic biopsy of the microcalcifications can be performed.    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  Focal ductal carcinoma insight 2, intermediate grade, solid pattern was seen.  ERP was negative, PRP was negative, HER2 Lisa was 3+ positive    Biopsy the right axilla lymph node returned positive for metastatic carcinoma.  Residual lymphoid architecture is not seen.    She had onset of menses at age 14.  She is a  2 para 2 miscarriage 0 individual.  She delivered her 1st child at the maternal age of 25 and the 2nd child at the maternal age of 30.  She took birth control pills for approximately 10 years.  She had bilateral tubal ligation at age 30.  She went through the menopause in her mid 40s and she did take hormone replacement therapy for approximately 10 years.      Mom had rectal cancer, dad had diabetes and heart disease.  A paternal aunt had breast cancer, maternal uncle  in his 20s of some type of cancer.  A brother has heart disease.  Another brother  of an overdose, and a 3rd brother  of unknown cause.  She has a sister that she believes may have type of cancer and another sister with diabetes.  Her son and daughter are alive and well.  There is no family history known to her of ovarian, prostate, pancreas, or melanoma cancers.      CAT of head negative for metastatic dx.  ECHO Ej Fx 65-68%  MRI of breast done 3/21/24.  I spoke with Radiology, they are awaiting mamm from , should have a report by 14 days, 24.  PET uptake in mass, R supraclavicular LN, infraclavicular LN, internal mammary LN, R axillary LN, and mild increase in FGD  with skin thickening and edema.    This week, tast is improved, BM NL.  Next  Rx 5/15/24, adjusted dosages.  PN at L hand resolved.  Energy 8/10  Cataract surgery 1/2024.     May 15th she was much improved and in better spirits.  Taste has returned to normal at this time and appetite has recovered, she actually gets hungry.  Her BMI is formed.  Today is day 1 of her 3rd cycle of chemotherapy.  And she does expect that her symptoms will recur later this weekend.  She does have her scopolamine patch in place, without nausea today, she did not have to take Zofran or Phenergan today.  She is going to take the Lomotil on Saturday x3 or 4 days preventively and then will take the Lomotil again if diarrhea symptoms recur.    Now on exam the anterior axillary lymph node is not palpable and the breast mass laterally is not palpable after 2 cycles of chemotherapy.  She did she is developing a small hernia at the site of her previous gallbladder surgery.  The hernia is nontender and easily reducible.    Her hemoglobin is 8.7 and her ferritin returned low at 21.  She could not tolerate oral iron because of GI symptoms.  She does agree to injected for or iron infusion weekly at the [facility].  There is a 1 or 2% risk of reaction to iron infusion.  Will give her Benadryl as a premedication and will observe her for 1 hour after each infusion.  Iron replenishment here will facilitate red blood cell production and improve her hemoglobin and should improve her energy.    S/P CTHP x's 4, marked fatigue, altered taste, 4 # weight loss.  For PET 6/28/24.  Defer course % to 7/3 or 7/5 after the PET.  Decide on 4 or 6 cycles of CT after reviewing Pet results.  Continue H and or P after PET.  On Venofer 4/10 to replenish Fe stores.      As of today, she has completed 6 of 10 Fe infusions, no increase in energy yet.  PET scan R breast mass resolved,  lymphadenopathy resolved after 4 cycles of chemo.  Stop chemotherapy now.  She is CR now.    She is BRCA 2 positive.  At this  point, 1 option to go with right lumpectomy and sentinel node biopsy followed by adjuvant radiation therapy to the right breast and axilla and other LN areas as per Dr. Villanueva.    Another option is right mastectomy and sentinel node biopsy.  Per Dr. Villanueva's note, she would still need radiation therapy after mastectomy.    Another option would be to go with right mastectomy and sentinel node biopsy with prophylactic left mastectomy.  Estimated risk of new primary breast cancer would be 2 or 3% after bilateral mastectomy here.    She has decided on R partial mastectomy.  Sentinal node bx,  I believe this is a reasonable option for her.  She follows up with Dr. Treviño 5/24/24 to decide on surgery date.  She will need Rad Rx to breast, axilla, other lissette areas as per Dr. Villanueva.    If residual Dx is found, then Xeloda x's 6 months will be discussed.  No residual Dx seen.    She had R partial mastectomy, SNBx and Bx of area of microcalcifications.  Path reports 7/30/24 and 8/13/24 No Residual Dx.  She is ERP and PRP negative and H2N positive.    She will need adjuvant H2N Rx for 1 year, H, after Rad Rx completed.   She return to work as mentor teacher in 10/2024.  She completed Rad Rx.   Skin at breast is pealing.    Due to begin herceptin q 3 weeks, 11/15/24, D1C1.  See Isabell 2 weeks, Check lab q 3 weeks.  D1C2 in 3 weeks 12/6/24.  Discussed following ECHO to check for cardiac toxicity 2nd Herceptin.  Current ECHO Ej Fx 65%.  Watch for diarrhea sx 2nd Herceptin, refill Lomotil qid prn.    Echocardiogram from November 20, 2024 shows an ejection fraction of 65%.  She returns to the office today.  She complains of intermittent chest tightness.  Also right-sided breast tenderness.  I placed an order for an EKG at [facility] and have asked her to see Dr. Derrick HATFIELD for evaluation of her symptoms.  She complains of easy fatigue, energy level is 5 to 7/10.  Her  reports energy level at 5/10.  She is mildly  "anemic and has an iron deficiency.  Stools for occult blood will be checked.  Will place her on iron infusions again to replenish iron stores and facilitate red blood cell production here.  Hemoglobin is 10.8 and ferritin is 17.  Iron infusion will be given at [facility].  She will follow up with myself in 4-6 weeks.    4/2/25 she complains of some breast pain.  Herceptin is on hold at this time, echocardiogram shows ejection fraction when from 60 down to 40%.  Echocardiogram will be followed.  A CAT scan from January showed some consolidation in the right upper lobe and right middle lobe with pleural thickening, which may be due to radiation change.  Stools for occult blood returned positive.  Will go for GI referral.    Discussed CT DNA monitoring for her breast cancer condition, lab was drawn and she will follow-up here in late May to review the results.  ROS:   GEN: normal without any fever, night sweats or weight loss  HEENT: normal with no HA's, sore throat, stiff neck, changes in vision  CV: normal with no CP, SOB, PND, CARNEY or orthopnea  PULM: normal with no SOB, cough, hemoptysis, sputum or pleuritic pain  GI: normal with no abdominal pain, nausea, vomiting, constipation, diarrhea, melanotic stools, BRBPR, or hematemesis  : normal with no hematuria, dysuria  BREAST: See HPI  SKIN: See HPI     Hx BTL, cholecystectomy.    Review of patient's allergies indicates:   Allergen Reactions    Carboplatin Other (See Comments)     Chest tightness and flushing       No meds.    Objective:   Vitals:  Blood pressure 126/71, pulse 79, temperature 97.9 °F (36.6 °C), temperature source Temporal, resp. rate 18, height 5' 4" (1.626 m), weight 65.6 kg (144 lb 9.6 oz), SpO2 97%.    Physical Examination:   GEN: no apparent distress, comfortable  HEAD: atraumatic and normocephalic  EYES: no pallor, no icterus  ENT: no pharyngeal erythema, external ears WNL; no nasal discharge  NECK: no masses, thyroid normal, trachea " midline, no LAD/LN's, supple  CV: RRR with no murmur; normal pulse  CHEST: Normal respiratory effort; CTAB; normal breath sounds; no wheeze or crackles  ABDOM: nontender and nondistended; soft; normal bowel sounds; no rebound/guarding  MUSC/Skeletal: ROM normal; no crepitus; joints normal; no deformities or arthropathy  EXTREM: no clubbing, cyanosis, inflammation or swelling  SKIN: no rashes, lesions, ulcers, petechiae or subcutaneous nodules  : no cvat  NEURO: grossly intact; motor/sensory WNL; no tremors  PSYCH: normal mood, affect and behavior  LYMPH: normal cervical, supraclavicular, and groin LN's  BREASTS:  Left breast is nontender, without discharge, and without palpable mass, left axilla is without palpable mass or lymphadenopathy.  R breast NT, incisions closed and healing, no palpable mass or lymphadenopathy.    MRI of the brain with contrast for staging will be done.  MRI of the left and right breast with contrast to check for a 2nd primary in the left breast, given her lobular features, will be done.  PET scan is being done for staging, to check for metastatic disease?    Assessment:   (1) 75 y.o. female abnormal mammogram and ultrasound with 4.8 cm mass noted at the anterior axilla and tell of the breast, also a large area of new micro calcifications are seen in the upper outer quadrant of the right breast, and a 1 cm nodule is noted at the 9 o'clock position of the right breast.    (2) biopsy of the 9:00 a.m. nodule and the anterior axillary mass returned showing invasive mixed ductal and lobular carcinoma, grade 2, ERP negative, PRP negative, HER2 Lisa 3+ positive.    (3) my impression at this time is that she is at least stage II.  Staging studies will include MRI with contrast of the brain, MRI with contrast of the left and right breast, PET scan will be done to check for metastatic disease.  CBC, CMP, breast tumor markers have been requested and echocardiogram for ejection fraction has been  requested.    (4) because of the negative ERP and PRP studies, tamoxifen or Arimidex would not be expected to help adjuvantly in decreasing her long-term systemic recurrence risk.    (5) the strongly 3+ positive HER2 Lisa would support that neoadjuvant carboplatin, taxane, Herceptin, Perjeta q. 3 weeks times 4-6 cycle may achieve a CHRIS state at the breast and axillary area after treatment.    (6) she does have the large area microcalcifications at the right breast in addition to the 9:00 a.m. biopsy-proven mass at the right breast.  The initial opinion as discussed with Dr. Villanueva, would be that she would require a right mastectomy and probably a right axillary node dissection, after the completion of neoadjuvant CTHP    (7) as discussed with Dr. Villanueva, she will probably need chest irradiation and right axillary radiation, after her definitive surgery.    (8) will forward a copy of this note to Dr. Treviño, she sees him on Wednesday.  Will ask Dr. Treviño to place a carisa catheter to facilitate systemic neoadjuvant treatment.    For now the PET scan is scheduled March 6 at 3:45 p.m..  Echocardiogram is scheduled at March 8th at 2:30 p.m..  MRI of brain is scheduled at March 8th at 3:00 p.m.  MRI of the breast is scheduled for March 21st at 3:15 p.m.  Iram is going to work on trying to get the MRI of the breast sooner.    (9) knowledge of her family history of cancer is incomplete.  A paternal aunt had breast cancer, a maternal uncle had cancer of unknown type, a sister gives a history of possible cancer.  I have requested BRCA 1 and BRCA 2 testing with an extended profile.    CTHP neoadjuvant Rx q 3 weeks x's 4-6 cycles.  D1C1 4/3/24.  At the end of Carboplatin infusion, she c/o chest pain.Isabell whitman NP saw her, medicated and sx resolved.  Suspected reaction to carboplatin, first infusion.    Cytoxan, Taxotere, Herceptin, Perjeta.  5/15/24.  Adjust dosages.    I think the hernia at the incision site where the  "gallbladder surgery was done can probably be repaired at a later time electively and does not appear to be emergency at this point.    She did have cataract surgery in January 2024 and is due to have some follow-up procedure done as part of that management.  She does have some blurriness of her vision.  I recommended that she defer the additional cataract procedure if feasible in the near future until we get her off of the chemotherapy and steroids and her other medications as these medicines may be contributing to her visual symptoms.    Hemoglobin is 8.7 and ferritin is 21.  She has iron deficiency anemia.  She did not tolerate oral iron supplementation.  Have discussed with her iron infusion.  I would plan to give her injectafer weekly x2 weeks.  Will premedicate her with Benadryl to reduce the risk of reaction to iron infusion.  I would estimate a 1-2% risk of reaction to iron infusion.  The iron infusion will be given at the [facility].  Iron administration should facilitate red blood cell production here and improve her hemoglobin up to or close to the normal range and should improve her energy level.      R partial mastectomy, SNB 7/30/24, microcalcification bx 8/13/24 CHRIS, no residual disease.  138#  5'5"    S/P Rad Rx.  Adjuvant anti H2N Rx x's 1 year.  Herceptin.  Monitor Ej Fx.    4/2/25  Echocardiogram from November shows good ejection fraction at 65%.  She is to see Dr. Meza for complaints of chest tightness evaluation.  Last Echo showed Ej Fx of 40%, hold herceptin and monitor with Echo.    She has mild anemia with iron deficiency, on iron infusions through [facility], stools for occult blood are heme +.  GI referral.    Discussed CT DNA monitoring of her breast cancer condition, lab was drawn, results are pending.    RTC 4 - 6 weeks.    Alexys Boyer MD  Heme Onc  4/2/25                                +                          "

## 2025-05-22 ENCOUNTER — TELEPHONE (OUTPATIENT)
Facility: CLINIC | Age: 75
End: 2025-05-22
Payer: MEDICARE

## 2025-05-22 DIAGNOSIS — C50.911 INVASIVE DUCTAL CARCINOMA OF BREAST, FEMALE, RIGHT: Primary | ICD-10-CM

## 2025-05-22 DIAGNOSIS — N64.4 BREAST PAIN, RIGHT: ICD-10-CM

## 2025-05-22 NOTE — TELEPHONE ENCOUNTER
----- Message from Day sent at 5/22/2025 12:08 PM CDT -----  Pt went to have MRI w/Contrast done last week and they had a hard time getting a vein, Pt was stuck 4 times. Pt never got her MRI done. Dr. Boyer told the patient she'd probably do better to go to the hospital to have that done. So, Pt wants her orders for the [facility] MRI w/contrast. If that is what  Wants her to do. # [phone]

## 2025-05-22 NOTE — TELEPHONE ENCOUNTER
Spoke to Patient. Her order was changed to [facility], per her request.   Imaging Center appointment cancelled. Patient added she did not want to have the MRI until she came back for her vacation ([place] of the country[place]

## 2025-06-09 ENCOUNTER — TELEPHONE (OUTPATIENT)
Facility: CLINIC | Age: 75
End: 2025-06-09
Payer: MEDICARE

## 2025-06-09 NOTE — TELEPHONE ENCOUNTER
Patient phoned Clinic. She is requesting her MRI/Breast be scheduled.  She explained it hasn't been done and no one has called her.  This RN phoned scheduling and was told by Silvino Patient has cancelled on 5/13/25, 5/21/25, and 5/22/25.  He was contacting Patient to schedule at this time.

## 2025-06-20 ENCOUNTER — TELEPHONE (OUTPATIENT)
Facility: CLINIC | Age: 75
End: 2025-06-20
Payer: MEDICARE

## 2025-06-20 NOTE — TELEPHONE ENCOUNTER
Patient called to remind that their CTDNA is due. Patient has upcoming appointment on June 23, 2025 and agreeable to get blood drawn at that time.  Patient verbalized understanding of above.

## 2025-06-23 ENCOUNTER — INFUSION (OUTPATIENT)
Dept: INFUSION THERAPY | Facility: HOSPITAL | Age: 75
End: 2025-06-23
Attending: INTERNAL MEDICINE
Payer: MEDICARE

## 2025-06-23 ENCOUNTER — TELEPHONE (OUTPATIENT)
Facility: CLINIC | Age: 75
End: 2025-06-23
Payer: MEDICARE

## 2025-06-23 VITALS
OXYGEN SATURATION: 99 % | DIASTOLIC BLOOD PRESSURE: 81 MMHG | WEIGHT: 145.63 LBS | HEART RATE: 75 BPM | BODY MASS INDEX: 24.86 KG/M2 | RESPIRATION RATE: 16 BRPM | HEIGHT: 64 IN | TEMPERATURE: 97 F | SYSTOLIC BLOOD PRESSURE: 124 MMHG

## 2025-06-23 DIAGNOSIS — C50.911 INVASIVE DUCTAL CARCINOMA OF BREAST, FEMALE, RIGHT: Primary | ICD-10-CM

## 2025-06-23 PROCEDURE — 25000003 PHARM REV CODE 250: Performed by: INTERNAL MEDICINE

## 2025-06-23 PROCEDURE — 96523 IRRIG DRUG DELIVERY DEVICE: CPT

## 2025-06-23 PROCEDURE — 63600175 PHARM REV CODE 636 W HCPCS: Performed by: INTERNAL MEDICINE

## 2025-06-23 PROCEDURE — A4216 STERILE WATER/SALINE, 10 ML: HCPCS | Performed by: INTERNAL MEDICINE

## 2025-06-23 RX ORDER — HEPARIN 100 UNIT/ML
500 SYRINGE INTRAVENOUS
OUTPATIENT
Start: 2025-06-23

## 2025-06-23 RX ORDER — SODIUM CHLORIDE 0.9 % (FLUSH) 0.9 %
10 SYRINGE (ML) INJECTION
OUTPATIENT
Start: 2025-06-23

## 2025-06-23 RX ORDER — HEPARIN 100 UNIT/ML
500 SYRINGE INTRAVENOUS
Status: DISCONTINUED | OUTPATIENT
Start: 2025-06-23 | End: 2025-06-23 | Stop reason: HOSPADM

## 2025-06-23 RX ORDER — SODIUM CHLORIDE 0.9 % (FLUSH) 0.9 %
10 SYRINGE (ML) INJECTION
Status: DISCONTINUED | OUTPATIENT
Start: 2025-06-23 | End: 2025-06-23 | Stop reason: HOSPADM

## 2025-06-23 RX ADMIN — Medication 10 ML: at 10:06

## 2025-06-23 RX ADMIN — HEPARIN 500 UNITS: 100 SYRINGE at 10:06

## 2025-06-23 NOTE — PLAN OF CARE
Problem: Fatigue  Goal: Improved Activity Tolerance  Outcome: Progressing  Intervention: Promote Improved Energy  Flowsheets (Taken 6/23/2025 1020)  Fatigue Management: paced activity encouraged  Sleep/Rest Enhancement: regular sleep/rest pattern promoted  Activity Management: Ambulated -L4  Environmental Support: environmental consistency promoted

## 2025-06-24 ENCOUNTER — HOSPITAL ENCOUNTER (OUTPATIENT)
Dept: RADIOLOGY | Facility: HOSPITAL | Age: 75
Discharge: HOME | End: 2025-06-24
Attending: NURSE PRACTITIONER
Payer: MEDICARE

## 2025-06-24 PROCEDURE — A9585 GADOBUTROL INJECTION: HCPCS | Mod: PO | Performed by: NURSE PRACTITIONER

## 2025-06-24 PROCEDURE — C8937 CAD BREAST MRI: HCPCS | Mod: TC,PO

## 2025-06-24 PROCEDURE — 77049 MRI BREAST C-+ W/CAD BI: CPT | Mod: 26,,, | Performed by: RADIOLOGY

## 2025-06-24 PROCEDURE — 25500020 PHARM REV CODE 255: Mod: PO | Performed by: NURSE PRACTITIONER

## 2025-06-24 RX ORDER — GADOBUTROL 604.72 MG/ML
6.5 INJECTION INTRAVENOUS
Status: COMPLETED | OUTPATIENT
Start: 2025-06-24 | End: 2025-06-24

## 2025-06-24 RX ADMIN — GADOBUTROL 6.5 ML: 604.72 INJECTION INTRAVENOUS at 09:06

## 2025-07-01 ENCOUNTER — TELEPHONE (OUTPATIENT)
Facility: CLINIC | Age: 75
End: 2025-07-01
Payer: MEDICARE

## 2025-07-01 NOTE — TELEPHONE ENCOUNTER
Isabell St Kim, NP-C reviewed patient's CTDNA results and per their verbal order, attempted to notify patient that results were Negative. Patient verbalized understanding of above.

## 2025-07-22 ENCOUNTER — OFFICE VISIT (OUTPATIENT)
Facility: CLINIC | Age: 75
End: 2025-07-22
Payer: MEDICARE

## 2025-07-22 VITALS
HEART RATE: 99 BPM | SYSTOLIC BLOOD PRESSURE: 103 MMHG | BODY MASS INDEX: 25.1 KG/M2 | RESPIRATION RATE: 16 BRPM | HEIGHT: 64 IN | TEMPERATURE: 98 F | DIASTOLIC BLOOD PRESSURE: 72 MMHG | OXYGEN SATURATION: 99 % | WEIGHT: 147 LBS

## 2025-07-22 DIAGNOSIS — C50.911 INVASIVE DUCTAL CARCINOMA OF BREAST, FEMALE, RIGHT: Primary | ICD-10-CM

## 2025-07-22 DIAGNOSIS — R94.30 ABNORMAL RESULT OF CARDIOVASCULAR FUNCTION STUDY, UNSPECIFIED: ICD-10-CM

## 2025-07-22 DIAGNOSIS — N64.4 BREAST PAIN, RIGHT: ICD-10-CM

## 2025-07-22 PROCEDURE — 3008F BODY MASS INDEX DOCD: CPT | Mod: CPTII,S$GLB,,

## 2025-07-22 PROCEDURE — 99417 PROLNG OP E/M EACH 15 MIN: CPT | Mod: S$GLB,,,

## 2025-07-22 PROCEDURE — 1159F MED LIST DOCD IN RCRD: CPT | Mod: CPTII,S$GLB,,

## 2025-07-22 PROCEDURE — 1126F AMNT PAIN NOTED NONE PRSNT: CPT | Mod: CPTII,S$GLB,,

## 2025-07-22 PROCEDURE — 3078F DIAST BP <80 MM HG: CPT | Mod: CPTII,S$GLB,,

## 2025-07-22 PROCEDURE — G2211 COMPLEX E/M VISIT ADD ON: HCPCS | Mod: S$GLB,,,

## 2025-07-22 PROCEDURE — 99215 OFFICE O/P EST HI 40 MIN: CPT | Mod: S$GLB,,,

## 2025-07-22 PROCEDURE — 1101F PT FALLS ASSESS-DOCD LE1/YR: CPT | Mod: CPTII,S$GLB,,

## 2025-07-22 PROCEDURE — 3288F FALL RISK ASSESSMENT DOCD: CPT | Mod: CPTII,S$GLB,,

## 2025-07-22 PROCEDURE — 3074F SYST BP LT 130 MM HG: CPT | Mod: CPTII,S$GLB,,

## 2025-07-22 PROCEDURE — 99999 PR PBB SHADOW E&M-EST. PATIENT-LVL V: CPT | Mod: PBBFAC,,,

## 2025-07-22 NOTE — ASSESSMENT & PLAN NOTE
Remains off treatment due to previous decrease in EF.   Will recheck echo and FU with Dr hurtado in 8 weeks to discuss tx options.   Monitor with CTDNA every 3 months  Repeat Breast MRI in 6 months  Order PT for mobility and strengthening of right arm

## 2025-07-22 NOTE — PROGRESS NOTES
[facility] HEMATOLGY ONCOLOGY CONSULTATION    Subjective:       Patient ID: Dora Atwood is a 75 y.o. female returning today for a regularly scheduled follow-up visit.    Chief Complaint: Breast Cancer HER 2 +       HPI  She is here today as a scheduled follow up visit to review recently orders labs, imaging and studies. She is here today by herself.     She previously had a lumpectomy, XRT, and TCHP x 4 cycles. She was on herceptin post XRT. She has had 5 herceptin infusions alone post XRT, but did have a drop in EF and herceptin has been on hold since     She had a breast MRI on 6/24/25 due to some worsening breast pains.     She is under CTDNA surveillance every 3 months    Colonoscopy was done per Dr Lara in April 22,2025 a polyp was removed, reccs to repeat in 5 years.        Past Medical History:   Diagnosis Date    Breast cancer 01/01/2024    Incisional hernia     from gallbladder surgery       Past Surgical History:   Procedure Laterality Date    BREAST LUMPECTOMY Right     CATARACT EXTRACTION Bilateral 01/2024    CHOLECYSTECTOMY  2022    INJECTION FOR SENTINEL NODE IDENTIFICATION Right 07/30/2024    Procedure: INJECTION, FOR SENTINEL NODE IDENTIFICATION;  Surgeon: Waleska Treviño MD;  Location: [facility];  Service: General;  Laterality: Right;    INSERTION OF TUNNELED CENTRAL VENOUS CATHETER (CVC) WITH SUBCUTANEOUS PORT Right 03/19/2024    Procedure: INSERTION, PORT-A-CATH;  Surgeon: Waleska Treviño MD;  Location: [facility] OR;  Service: General;  Laterality: Right;    MASTECTOMY, PARTIAL Right 07/30/2024    Procedure: MASTECTOMY, PARTIAL;  Surgeon: Waleska Treviño MD;  Location: [facility] OR;  Service: General;  Laterality: Right;  SYDNIE  (7/25)    TUBAL LIGATION  1981       Social History     Socioeconomic History    Marital status:    Tobacco Use    Smoking status: Never    Smokeless tobacco: Never   Substance and Sexual Activity    Alcohol use: Never    Drug use: Never    Sexual activity: Not Currently      Social Drivers of Health     Financial Resource Strain: Low Risk  (3/21/2024)    Overall Financial Resource Strain (CARDIA)     Difficulty of Paying Living Expenses: Not hard at all   Food Insecurity: No Food Insecurity (3/21/2024)    Hunger Vital Sign     Worried About Running Out of Food in the Last Year: Never true     Ran Out of Food in the Last Year: Never true   Transportation Needs: No Transportation Needs (3/21/2024)    PRAPARE - Transportation     Lack of Transportation (Medical): No     Lack of Transportation (Non-Medical): No   Physical Activity: Sufficiently Active (3/21/2024)    Exercise Vital Sign     Days of Exercise per Week: 6 days     Minutes of Exercise per Session: 30 min   Stress: Stress Concern Present (3/21/2024)    [employer] - Occupational Stress Questionnaire     Feeling of Stress : To some extent   Housing Stability: Low Risk  (3/21/2024)    Housing Stability Vital Sign     Unable to Pay for Housing in the Last Year: No     Number of Places Lived in the Last Year: 1     Unstable Housing in the Last Year: No       Family History   Problem Relation Name Age of Onset    Rectal cancer Mother      Breast cancer Maternal Aunt         Review of patient's allergies indicates:   Allergen Reactions    Carboplatin Other (See Comments)     Chest tightness and flushing       Current Outpatient Medications:     diphenoxylate-atropine 2.5-0.025 mg (LOMOTIL) 2.5-0.025 mg per tablet, Take 1 tablet by mouth 4 (four) times daily as needed for Diarrhea., Disp: 30 tablet, Rfl: 1    pantoprazole (PROTONIX) 40 MG tablet, SMARTSI Tablet(s) By Mouth Morning-Evening, Disp: , Rfl:     traMADoL (ULTRAM) 50 mg tablet, Take 1 to 2 tablets every 8 hours as needed for pain by mouth, Disp: 60 each, Rfl: 0    amoxicillin (AMOXIL) 500 MG Tab, Take 1,000 mg by mouth 2 (two) times daily. (Patient not taking: Reported on 2025), Disp: , Rfl:     clarithromycin (BIAXIN) 500 MG tablet,  "Take 500 mg by mouth 2 (two) times daily. (Patient not taking: Reported on 7/22/2025), Disp: , Rfl:     All medications and past history have been reviewed.    Review of Systems   Constitutional:  Negative for appetite change and unexpected weight change.   HENT:  Negative for mouth sores.    Eyes:  Negative for visual disturbance.   Respiratory:  Negative for cough and shortness of breath.    Cardiovascular:  Negative for chest pain.   Gastrointestinal:  Negative for abdominal pain and diarrhea.   Genitourinary:  Negative for frequency.   Musculoskeletal:  Negative for back pain.   Skin:  Negative for rash.        Right breast pain     Neurological:  Negative for headaches.   Hematological:  Negative for adenopathy.   Psychiatric/Behavioral:  The patient is not nervous/anxious.        Objective:        /72 (BP Location: Left arm)   Pulse 99   Temp 98 °F (36.7 °C) (Temporal)   Resp 16   Ht 5' 4" (1.626 m)   Wt 66.7 kg (147 lb)   SpO2 99%   BMI 25.23 kg/m²     Physical Exam  Constitutional:       Appearance: Normal appearance.   HENT:      Head: Normocephalic and atraumatic.      Mouth/Throat:      Mouth: Mucous membranes are moist.   Cardiovascular:      Rate and Rhythm: Normal rate and regular rhythm.      Pulses: Normal pulses.      Heart sounds: Normal heart sounds.   Pulmonary:      Effort: Pulmonary effort is normal. No respiratory distress.      Breath sounds: Normal breath sounds. No wheezing.   Chest:   Breasts:     Right: Inverted nipple, skin change and tenderness present.      Left: Inverted nipple present.   Abdominal:      General: There is no distension.      Palpations: Abdomen is soft. There is no mass.      Tenderness: There is no abdominal tenderness.   Musculoskeletal:         General: No swelling. Normal range of motion.      Right lower leg: No edema.      Left lower leg: No edema.   Skin:     General: Skin is warm and dry.      Capillary Refill: Capillary refill takes 2 to 3 " seconds.      Findings: No bruising or rash.   Neurological:      Mental Status: She is alert and oriented to person, place, and time. Mental status is at baseline.      Motor: No weakness.   Psychiatric:         Mood and Affect: Mood normal.         Behavior: Behavior normal.           Lab:    Component  Ref Range & Units (hover) 3 wk ago 9 mo ago 1 yr ago   WBC 5.14 4.19 6.36   RBC 4.31 4.16 3.79 Low    Hgb 12.7 12.3 R 11.9 Low  R   Hct 39.2 39.2 37.4   MCV 91 94 99 High    MCH 29.5 29.6 31.4 High    MCHC 32.4 31.4 Low  31.8 Low    RDW 15.3 High  15.3 High  13.1   Platelet Count 244 261 292   MPV 8.6 Low  8.7 Low  8.8 Low    Nucleated RBC 0 0 R    Neut % 75.1 High      Lymph % 12.1 Low      Mono % 8.9 9.1 R    Eos % 2.7 3.3 R    Basophil % 1.0 0.7 R    Imm Grans % 0.2 0.2    Neut # 3.9     Lymph # 0.62 Low  0.7 Low  R    Mono # 0.46 0.4 R    Eos # 0.14 0.1 R    Baso # 0.05 0.03 R    Imm Grans # 0.01 0.01 CM    Comment: Mild elevation in immature granulocytes is non specific and can be seen in a variety of conditions including stress response, acute inflammation, trauma and pregnancy. Correlation with other laboratory and clinical findings is essential.   [facility] SMLB SML     Component  Ref Range & Units (hover) 3 wk ago  (4/2/25) 9 mo ago  (7/26/24) 1 yr ago  (3/15/24) 1 yr ago  (3/15/24) 1 yr ago  (3/14/24)   Sodium 139 141   142   Potassium 3.7 4.0   3.8   Chloride 102 105   105   CO2 29 27   32 High    Glucose 85 85   116 High    BUN 13 13   16   Creatinine 0.6 0.6 0.7 0.7 0.7   Calcium 9.3 9.4   9.0   Protein Total 7.2       Albumin 4.0       Bilirubin Total 0.3       Comment: For infants and newborns, interpretation of results should be based  on gestational age, weight and in agreement with clinical  observations.    Premature Infant recommended reference ranges:  0-24 hours:  <8.0 mg/dL  24-48 hours: <12.0 mg/dL  3-5 days:    <15.0 mg/dL  6-29 days:   <15.0 mg/dL          AST 16        ALT 14       Anion Gap 8 9   5 Low    eGFR >60         Cancer Antigen 27-29  Order: [id number]   Status: Final result       Next appt: 05/05/2025 at 09:00 AM in Chemotherapy (INJECTION CHAIR 01)       Dx: Iron deficiency anemia due to chronic...    Test Result Released: Yes (seen)    0 Result Notes      Component  Ref Range & Units (hover) 3 wk ago   CA 27-29 26.2   Comment: Siem[employer] Immunochemiluminometric Methodology (ICMA)  Values obtained with different assay methods or kits cannot be used  interchangeably. Results cannot be interpreted as absolute evidence  of the presence or absence of malignant disease.   [facility]              Narrative  Performed by: [facility]  Performed at:  [address] [place] [address]  [id number]  : Doug Swift MD, Phone:  [phone]   Specimen Collected: 04/02/25 15:35 CDT Last Resulted: 04/04/25 11:36 CDT        Lab Flowsheet          Order Details          View Encounter          Lab and Collection Details          Routing          Result History       View All Conversations on this Encounter         MyChart Results Release    MyChart Status: Active  Results Release      Cancer Antigen 15-3  Order: [id number]   Status: Final result       Next appt: 05/05/2025 at 09:00 AM in Chemotherapy (INJECTION CHAIR 01)       Dx: Iron deficiency anemia due to chronic...    Test Result Released: Yes (seen)    0 Result Notes      Component  Ref Range & Units (hover) 3 wk ago   CA 15-3 17.0   Comment: [employer] Electrochemiluminescence Immunoassay (ECLIA)  Values obtained with different assay methods or kits cannot be  used interchangeably.  Results cannot be interpreted as absolute  evidence of the presence or absence of malignant disease.   [facility]              Narrative  Performed by: [facility]  Performed at:  [address] [place] [address]  [place], [place]  [id number]  : Doug Swift MD, Phone:  [phone]       Radiology/Diagnostic Studies:    ECHO 2/4/2025:     Left Ventricle: The left ventricle is normal in size. Normal wall thickness. Mild global hypokinesis present. There is mildly reduced systolic function with a visually estimated ejection fraction of 40 - 45%. There is normal diastolic function.    Right Ventricle: Systolic function is normal.    Right Atrium: Right atrium is mildly dilated.    Tricuspid Valve: There is mild regurgitation.    Pulmonary Artery: The estimated pulmonary artery systolic pressure is 30 mmHg.    IVC/SVC: Normal venous pressure at 3 mmHg.      Mammogram and US 2/7/2025  Impression:     Stable postsurgical changes in the upper outer right breast with removal of the suspicious calcifications.  There is mild skin thickening on the right compatible with postradiation changes     No mammographic abnormality in the left breast     4 mm benign-appearing lymph node in the right axilla with no additional adenopathy     Recommendation: Follow-up right mammogram in 6 months is recommended.     BI-RADS CATEGORY: 3  PROBABLY BENIGN FINDING - SHORT TERM INTERVAL FOLLOWUP SUGGESTED.     Technologist: MELISSA        Electronically signed by:Dianna Pires  Date:                                            02/07/2025  Time:                                           12:01    MRI Breast 6/24/25    Impression:     1.  Post treatment change in the right breast with no dominant mass or lymphadenopathy identified.     2.  Small foci of enhancement in the left breast fibroglandular tissue favored to represent background parenchymal enhancement noting these are somewhat decreased in conspicuity from the previous MRI, however correlation with the symptoms, history and six-month follow-up may be warranted.      All lab results and imaging results have been reviewed and discussed with the patient.   Assessment/Plan:       1. Invasive  ductal carcinoma of breast, female, right  Assessment & Plan:  Remains off treatment due to previous decrease in EF.   Will recheck echo and FU with Dr hurtado in 8 weeks to discuss tx options.   Monitor with CTDNA every 3 months  Repeat Breast MRI in 6 months  Order PT for mobility and strengthening of right arm      Orders:  -     Cancer Antigen 27-29; Future; Expected date: 07/22/2025  -     Cancer Antigen 15-3; Future; Expected date: 07/22/2025  -     CBC Auto Differential; Future; Expected date: 07/22/2025  -     CMP; Future; Expected date: 07/22/2025  -     Echo; Future; Expected date: 07/23/2025  -     MRI Breast w/o Contrast, Bilateral; Future; Expected date: 12/29/2025  -     Ambulatory Referral/Consult to Physical Therapy; Future; Expected date: 07/29/2025    2. Ejection fraction < 50%  Assessment & Plan:  Repeat echo       3. Breast pain, right  Assessment & Plan:  MRI breast is clear, PT ordered for mobility and strengthening     Orders:  -     Ambulatory Referral/Consult to Physical Therapy; Future; Expected date: 07/29/2025             PLAN:   Remains off treatment due to previous decrease in EF.   Will recheck echo and FU with Dr hurtado in 8 weeks to discuss tx options. She is interested in having portcath removed if plan is not to resume herceptin.  Monitor with CTDNA every 3 months  Repeat Breast MRI in 6 months  Order PT for mobility and strengthening of right arm       Cancer Staging   Invasive ductal carcinoma of breast, female, right  Staging form: Breast, AJCC 8th Edition  - Clinical: Stage IIIB (cT1b, cN3c, cM0, G2, ER-, IA-, HER2+) - Signed by Meño Villanueva Jr., MD on 7/1/2024  - Pathologic: ypT0, pN0(sn), cM0 - Signed by Meño Villanueva Jr., MD on 11/12/2024        I have explained and the patient understands all of  the current recommendation(s). I have answered all of their questions to the best of my ability and to their complete satisfaction.   The patient is to continue with the  current management plan.            Electronically signed by: Isabell Gayle, MSN, APRN, AGNP-C

## 2025-08-04 ENCOUNTER — INFUSION (OUTPATIENT)
Dept: INFUSION THERAPY | Facility: HOSPITAL | Age: 75
End: 2025-08-04
Attending: INTERNAL MEDICINE
Payer: MEDICARE

## 2025-08-04 VITALS
HEART RATE: 74 BPM | DIASTOLIC BLOOD PRESSURE: 85 MMHG | OXYGEN SATURATION: 97 % | WEIGHT: 146.38 LBS | RESPIRATION RATE: 18 BRPM | BODY MASS INDEX: 24.99 KG/M2 | SYSTOLIC BLOOD PRESSURE: 132 MMHG | HEIGHT: 64 IN | TEMPERATURE: 98 F

## 2025-08-04 DIAGNOSIS — C50.911 INVASIVE DUCTAL CARCINOMA OF BREAST, FEMALE, RIGHT: Primary | ICD-10-CM

## 2025-08-04 PROCEDURE — 63600175 PHARM REV CODE 636 W HCPCS: Performed by: INTERNAL MEDICINE

## 2025-08-04 PROCEDURE — 96523 IRRIG DRUG DELIVERY DEVICE: CPT

## 2025-08-04 PROCEDURE — 25000003 PHARM REV CODE 250: Performed by: INTERNAL MEDICINE

## 2025-08-04 PROCEDURE — A4216 STERILE WATER/SALINE, 10 ML: HCPCS | Performed by: INTERNAL MEDICINE

## 2025-08-04 RX ORDER — SODIUM CHLORIDE 0.9 % (FLUSH) 0.9 %
10 SYRINGE (ML) INJECTION
OUTPATIENT
Start: 2025-08-04

## 2025-08-04 RX ORDER — HEPARIN 100 UNIT/ML
500 SYRINGE INTRAVENOUS
OUTPATIENT
Start: 2025-08-04

## 2025-08-04 RX ORDER — SODIUM CHLORIDE 0.9 % (FLUSH) 0.9 %
10 SYRINGE (ML) INJECTION
Status: DISCONTINUED | OUTPATIENT
Start: 2025-08-04 | End: 2025-08-04 | Stop reason: HOSPADM

## 2025-08-04 RX ORDER — HEPARIN 100 UNIT/ML
500 SYRINGE INTRAVENOUS
Status: DISCONTINUED | OUTPATIENT
Start: 2025-08-04 | End: 2025-08-04 | Stop reason: HOSPADM

## 2025-08-04 RX ADMIN — HEPARIN 500 UNITS: 100 SYRINGE at 08:08

## 2025-08-04 RX ADMIN — SODIUM CHLORIDE, PRESERVATIVE FREE 10 ML: 5 INJECTION INTRAVENOUS at 08:08

## 2025-08-07 ENCOUNTER — HOSPITAL ENCOUNTER (OUTPATIENT)
Dept: CARDIOLOGY | Facility: HOSPITAL | Age: 75
Discharge: HOME | End: 2025-08-07
Payer: MEDICARE

## 2025-08-07 VITALS — BODY MASS INDEX: 24.92 KG/M2 | HEIGHT: 64 IN | WEIGHT: 146 LBS

## 2025-08-07 DIAGNOSIS — C50.911 INVASIVE DUCTAL CARCINOMA OF BREAST, FEMALE, RIGHT: ICD-10-CM

## 2025-08-07 LAB
AORTIC ROOT ANNULUS: 3 CM
AORTIC VALVE CUSP SEPERATION: 1.7 CM
APICAL FOUR CHAMBER EJECTION FRACTION: 65 %
AV INDEX (PROSTH): 0.79
AV MEAN GRADIENT: 6 MMHG
AV PEAK GRADIENT: 10 MMHG
AV VALVE AREA BY VELOCITY RATIO: 2.4 CM²
AV VALVE AREA: 2.5 CM²
AV VELOCITY RATIO: 0.75
BSA FOR ECHO PROCEDURE: 1.73 M2
CV ECHO LV RWT: 0.67 CM
DOP CALC AO PEAK VEL: 1.6 M/S
DOP CALC AO VTI: 32.5 CM
DOP CALC LVOT AREA: 3.1 CM2
DOP CALC LVOT DIAMETER: 2 CM
DOP CALC LVOT PEAK VEL: 1.2 M/S
DOP CALCLVOT PEAK VEL VTI: 25.6 CM
E WAVE DECELERATION TIME: 190 MSEC
E/A RATIO: 0.75
E/E' RATIO: 8 M/S
ECHO LV POSTERIOR WALL: 1.1 CM (ref 0.6–1.1)
FRACTIONAL SHORTENING: 30.3 % (ref 28–44)
INTERVENTRICULAR SEPTUM: 1.1 CM (ref 0.6–1.1)
IVRT: 100 MSEC
LEFT ATRIUM SIZE: 3.4 CM
LEFT INTERNAL DIMENSION IN SYSTOLE: 2.3 CM (ref 2.1–4)
LEFT VENTRICLE DIASTOLIC VOLUME INDEX: 25.15 ML/M2
LEFT VENTRICLE DIASTOLIC VOLUME: 43 ML
LEFT VENTRICLE END DIASTOLIC VOLUME APICAL 4 CHAMBER INDEX BSA: 34.04 ML/M2
LEFT VENTRICLE END DIASTOLIC VOLUME APICAL 4 CHAMBER: 58.2 ML
LEFT VENTRICLE MASS INDEX: 63.8 G/M2
LEFT VENTRICLE SYSTOLIC VOLUME INDEX: 9.9 ML/M2
LEFT VENTRICLE SYSTOLIC VOLUME: 17 ML
LEFT VENTRICULAR INTERNAL DIMENSION IN DIASTOLE: 3.3 CM (ref 3.5–6)
LEFT VENTRICULAR MASS: 109.1 G
LV LATERAL E/E' RATIO: 8.9 M/S
LV SEPTAL E/E' RATIO: 7.9 M/S
LVED V (TEICH): 43.2 ML
LVES V (TEICH): 17.3 ML
LVOT MG: 3 MMHG
LVOT MV: 0.79 CM/S
MV PEAK A VEL: 0.95 M/S
MV PEAK E VEL: 0.71 M/S
MV STENOSIS PRESSURE HALF TIME: 61 MS
MV VALVE AREA P 1/2 METHOD: 3.61 CM2
OHS CV CPX PATIENT HEIGHT IN: 64
OHS CV RV/LV RATIO: 0.7 CM
PISA TR MAX VEL: 2.4 M/S
PV MV: 0.82 M/S
PV PEAK GRADIENT: 6 MMHG
PV PEAK VELOCITY: 1.27 M/S
RA PRESSURE ESTIMATED: 3 MMHG
RIGHT VENTRICLE DIASTOLIC BASEL DIMENSION: 2.3 CM
RIGHT VENTRICULAR END-DIASTOLIC DIMENSION: 2.26 CM
RV TB RVSP: 5 MMHG
TDI LATERAL: 0.08 M/S
TDI SEPTAL: 0.09 M/S
TDI: 0.09 M/S
TR MAX PG: 23 MMHG
TV REST PULMONARY ARTERY PRESSURE: 26 MMHG
Z-SCORE OF LEFT VENTRICULAR DIMENSION IN END DIASTOLE: -3.64
Z-SCORE OF LEFT VENTRICULAR DIMENSION IN END SYSTOLE: -1.94

## 2025-08-07 PROCEDURE — 93306 TTE W/DOPPLER COMPLETE: CPT | Mod: 26,,, | Performed by: GENERAL PRACTICE

## 2025-08-07 PROCEDURE — 93306 TTE W/DOPPLER COMPLETE: CPT

## 2025-08-14 ENCOUNTER — HOSPITAL ENCOUNTER (OUTPATIENT)
Dept: RADIOLOGY | Facility: HOSPITAL | Age: 75
Discharge: HOME | End: 2025-08-14
Attending: INTERNAL MEDICINE
Payer: MEDICARE

## 2025-08-14 ENCOUNTER — OFFICE VISIT (OUTPATIENT)
Dept: PULMONOLOGY | Facility: CLINIC | Age: 75
End: 2025-08-14
Payer: MEDICARE

## 2025-08-14 VITALS
WEIGHT: 146 LBS | OXYGEN SATURATION: 97 % | HEART RATE: 96 BPM | BODY MASS INDEX: 24.92 KG/M2 | HEIGHT: 64 IN | DIASTOLIC BLOOD PRESSURE: 78 MMHG | SYSTOLIC BLOOD PRESSURE: 130 MMHG

## 2025-08-14 DIAGNOSIS — J92.9 PLEURAL THICKENING: ICD-10-CM

## 2025-08-14 DIAGNOSIS — J18.9 PNEUMONIA OF RIGHT LOWER LOBE DUE TO INFECTIOUS ORGANISM: Primary | ICD-10-CM

## 2025-08-14 DIAGNOSIS — C50.911 MALIGNANT NEOPLASM OF RIGHT FEMALE BREAST, UNSPECIFIED ESTROGEN RECEPTOR STATUS, UNSPECIFIED SITE OF BREAST: ICD-10-CM

## 2025-08-14 DIAGNOSIS — J18.9 PNEUMONIA OF RIGHT LOWER LOBE DUE TO INFECTIOUS ORGANISM: ICD-10-CM

## 2025-08-14 PROCEDURE — 3078F DIAST BP <80 MM HG: CPT | Mod: CPTII,S$GLB,, | Performed by: INTERNAL MEDICINE

## 2025-08-14 PROCEDURE — 3075F SYST BP GE 130 - 139MM HG: CPT | Mod: CPTII,S$GLB,, | Performed by: INTERNAL MEDICINE

## 2025-08-14 PROCEDURE — 71046 X-RAY EXAM CHEST 2 VIEWS: CPT | Mod: TC

## 2025-08-14 PROCEDURE — 99213 OFFICE O/P EST LOW 20 MIN: CPT | Mod: S$GLB,,, | Performed by: INTERNAL MEDICINE

## 2025-08-14 PROCEDURE — 1159F MED LIST DOCD IN RCRD: CPT | Mod: CPTII,S$GLB,, | Performed by: INTERNAL MEDICINE

## 2025-08-14 PROCEDURE — 71046 X-RAY EXAM CHEST 2 VIEWS: CPT | Mod: 26,,, | Performed by: RADIOLOGY

## 2025-08-14 PROCEDURE — 99999 PR PBB SHADOW E&M-EST. PATIENT-LVL III: CPT | Mod: PBBFAC,,, | Performed by: INTERNAL MEDICINE

## 2025-08-14 PROCEDURE — 3288F FALL RISK ASSESSMENT DOCD: CPT | Mod: CPTII,S$GLB,, | Performed by: INTERNAL MEDICINE

## 2025-08-14 PROCEDURE — 1126F AMNT PAIN NOTED NONE PRSNT: CPT | Mod: CPTII,S$GLB,, | Performed by: INTERNAL MEDICINE

## 2025-08-14 PROCEDURE — 1101F PT FALLS ASSESS-DOCD LE1/YR: CPT | Mod: CPTII,S$GLB,, | Performed by: INTERNAL MEDICINE

## 2025-08-14 PROCEDURE — 3008F BODY MASS INDEX DOCD: CPT | Mod: CPTII,S$GLB,, | Performed by: INTERNAL MEDICINE

## 2025-08-15 ENCOUNTER — TELEPHONE (OUTPATIENT)
Dept: PULMONOLOGY | Facility: CLINIC | Age: 75
End: 2025-08-15
Payer: MEDICARE

## 2025-08-18 ENCOUNTER — LAB VISIT (OUTPATIENT)
Dept: LAB | Facility: HOSPITAL | Age: 75
End: 2025-08-18
Payer: MEDICARE

## 2025-08-18 DIAGNOSIS — C50.911 INVASIVE DUCTAL CARCINOMA OF BREAST, FEMALE, RIGHT: ICD-10-CM

## 2025-08-18 LAB
ABSOLUTE EOSINOPHIL (SMH): 0.06 K/UL
ABSOLUTE MONOCYTE (SMH): 0.45 K/UL (ref 0.3–1)
ABSOLUTE NEUTROPHIL COUNT (SMH): 3.8 K/UL (ref 1.8–7.7)
ALBUMIN SERPL-MCNC: 4.4 G/DL (ref 3.5–5.2)
ALP SERPL-CCNC: 81 UNIT/L (ref 55–135)
ALT SERPL-CCNC: 11 UNIT/L (ref 10–44)
ANION GAP (SMH): 7 MMOL/L (ref 8–16)
AST SERPL-CCNC: 15 UNIT/L (ref 10–40)
BASOPHILS # BLD AUTO: 0.04 K/UL
BASOPHILS NFR BLD AUTO: 0.8 %
BILIRUB SERPL-MCNC: 0.4 MG/DL (ref 0.1–1)
BUN SERPL-MCNC: 17 MG/DL (ref 8–23)
CALCIUM SERPL-MCNC: 9.8 MG/DL (ref 8.7–10.5)
CHLORIDE SERPL-SCNC: 101 MMOL/L (ref 95–110)
CO2 SERPL-SCNC: 30 MMOL/L (ref 23–29)
CREAT SERPL-MCNC: 0.6 MG/DL (ref 0.5–1.4)
ERYTHROCYTE [DISTWIDTH] IN BLOOD BY AUTOMATED COUNT: 12.5 % (ref 11.5–14.5)
GFR SERPLBLD CREATININE-BSD FMLA CKD-EPI: >60 ML/MIN/1.73/M2
GLUCOSE SERPL-MCNC: 96 MG/DL (ref 70–110)
HCT VFR BLD AUTO: 41.7 % (ref 37–48.5)
HGB BLD-MCNC: 14 GM/DL (ref 12–16)
IMM GRANULOCYTES # BLD AUTO: 0.02 K/UL (ref 0–0.04)
IMM GRANULOCYTES NFR BLD AUTO: 0.4 % (ref 0–0.5)
LYMPHOCYTES # BLD AUTO: 0.69 K/UL (ref 1–4.8)
MCH RBC QN AUTO: 32.9 PG (ref 27–31)
MCHC RBC AUTO-ENTMCNC: 33.6 G/DL (ref 32–36)
MCV RBC AUTO: 98 FL (ref 82–98)
NUCLEATED RBC (/100WBC) (SMH): 0 /100 WBC
PLATELET # BLD AUTO: 241 K/UL (ref 150–450)
PMV BLD AUTO: 8.7 FL (ref 9.2–12.9)
POTASSIUM SERPL-SCNC: 3.9 MMOL/L (ref 3.5–5.1)
PROT SERPL-MCNC: 7.1 GM/DL (ref 6–8.4)
RBC # BLD AUTO: 4.26 M/UL (ref 4–5.4)
RELATIVE EOSINOPHIL (SMH): 1.2 % (ref 0–8)
RELATIVE LYMPHOCYTE (SMH): 13.7 % (ref 18–48)
RELATIVE MONOCYTE (SMH): 9 % (ref 4–15)
RELATIVE NEUTROPHIL (SMH): 74.9 % (ref 38–73)
SODIUM SERPL-SCNC: 138 MMOL/L (ref 136–145)
WBC # BLD AUTO: 5.02 K/UL (ref 3.9–12.7)

## 2025-08-18 PROCEDURE — 85025 COMPLETE CBC W/AUTO DIFF WBC: CPT

## 2025-08-18 PROCEDURE — 36415 COLL VENOUS BLD VENIPUNCTURE: CPT

## 2025-08-18 PROCEDURE — 86300 IMMUNOASSAY TUMOR CA 15-3: CPT | Mod: 59

## 2025-08-18 PROCEDURE — 86300 IMMUNOASSAY TUMOR CA 15-3: CPT

## 2025-08-18 PROCEDURE — 84460 ALANINE AMINO (ALT) (SGPT): CPT

## 2025-08-20 LAB
CANCER AG15-3 SERPL-ACNC: 15.3 U/ML (ref 0–25)
CANCER AG27-29 SERPL-ACNC: 19.7 U/ML (ref 0–38.6)

## 2025-08-25 ENCOUNTER — OFFICE VISIT (OUTPATIENT)
Facility: CLINIC | Age: 75
End: 2025-08-25
Payer: MEDICARE

## 2025-08-25 VITALS
DIASTOLIC BLOOD PRESSURE: 77 MMHG | BODY MASS INDEX: 24.45 KG/M2 | WEIGHT: 143.19 LBS | RESPIRATION RATE: 17 BRPM | TEMPERATURE: 98 F | HEIGHT: 64 IN | OXYGEN SATURATION: 98 % | SYSTOLIC BLOOD PRESSURE: 118 MMHG | HEART RATE: 80 BPM

## 2025-08-25 DIAGNOSIS — Z86.2 HISTORY OF IRON DEFICIENCY ANEMIA: ICD-10-CM

## 2025-08-25 DIAGNOSIS — Z85.3 HISTORY OF BREAST CANCER IN FEMALE: Primary | ICD-10-CM

## 2025-08-25 PROCEDURE — 3008F BODY MASS INDEX DOCD: CPT | Mod: CPTII,S$GLB,, | Performed by: INTERNAL MEDICINE

## 2025-08-25 PROCEDURE — 3078F DIAST BP <80 MM HG: CPT | Mod: CPTII,S$GLB,, | Performed by: INTERNAL MEDICINE

## 2025-08-25 PROCEDURE — 3074F SYST BP LT 130 MM HG: CPT | Mod: CPTII,S$GLB,, | Performed by: INTERNAL MEDICINE

## 2025-08-25 PROCEDURE — G2211 COMPLEX E/M VISIT ADD ON: HCPCS | Mod: S$GLB,,, | Performed by: INTERNAL MEDICINE

## 2025-08-25 PROCEDURE — 1101F PT FALLS ASSESS-DOCD LE1/YR: CPT | Mod: CPTII,S$GLB,, | Performed by: INTERNAL MEDICINE

## 2025-08-25 PROCEDURE — 1159F MED LIST DOCD IN RCRD: CPT | Mod: CPTII,S$GLB,, | Performed by: INTERNAL MEDICINE

## 2025-08-25 PROCEDURE — 3288F FALL RISK ASSESSMENT DOCD: CPT | Mod: CPTII,S$GLB,, | Performed by: INTERNAL MEDICINE

## 2025-08-25 PROCEDURE — 1126F AMNT PAIN NOTED NONE PRSNT: CPT | Mod: CPTII,S$GLB,, | Performed by: INTERNAL MEDICINE

## 2025-08-25 PROCEDURE — 99215 OFFICE O/P EST HI 40 MIN: CPT | Mod: S$GLB,,, | Performed by: INTERNAL MEDICINE

## 2025-08-25 PROCEDURE — 99999 PR PBB SHADOW E&M-EST. PATIENT-LVL IV: CPT | Mod: PBBFAC,,, | Performed by: INTERNAL MEDICINE

## 2025-08-26 ENCOUNTER — TELEPHONE (OUTPATIENT)
Facility: CLINIC | Age: 75
End: 2025-08-26
Payer: MEDICARE

## (undated) DEVICE — CHLORAPREP 10.5 ML APPLICATOR

## (undated) DEVICE — DECANTER FLUID TRNSF WHITE 9IN

## (undated) DEVICE — ADHESIVE DERMABOND MINI HV

## (undated) DEVICE — DRESSING MEPILEX 4X6IN

## (undated) DEVICE — DRAIN ROUND 100CC 1/8

## (undated) DEVICE — GAUZE VISTEC XR DTECT 16 4X4IN

## (undated) DEVICE — DRAPE T TRNSVRS LAP 102X78X121

## (undated) DEVICE — MARGIN MARKER STANDARD 6 COLOR

## (undated) DEVICE — DRESSING TRNSPAR 2.375X2.75

## (undated) DEVICE — DRESSING TEGADERM 4.4X5IN

## (undated) DEVICE — SUT STRATAFIX 4-0 30CM PS-2

## (undated) DEVICE — Device

## (undated) DEVICE — SUT VICRYL 2-0 36 CT-1

## (undated) DEVICE — SPONGE COTTON WOVEN 4X4IN

## (undated) DEVICE — ELECTRODE BLADE INSULATED 1 IN

## (undated) DEVICE — DRAPE THYROID SOFT STERILE

## (undated) DEVICE — PROTECTOR ULNAR NERVE FOAM

## (undated) DEVICE — STAPLER SKIN PROXIMATE WIDE

## (undated) DEVICE — TRAY GENERAL SURGERY SMH

## (undated) DEVICE — ELECTRODE REM PLYHSV RETURN 9

## (undated) DEVICE — DRAPE THREE-QUARTER 53X77IN

## (undated) DEVICE — SHEATH GUIDE SCOUT SURG RADAR

## (undated) DEVICE — DRESSING MEPORE ISLAND 31/2X4

## (undated) DEVICE — DRAPE C ARM 42 X 120 10/BX

## (undated) DEVICE — DRESSING TRANS 4X4 3/4

## (undated) DEVICE — NDL ECLIPSE SAFETY 23G 1.5IN

## (undated) DEVICE — SOL NACL IRR 1000ML BTL

## (undated) DEVICE — GLOVE BIOGEL PI MICRO SZ 7

## (undated) DEVICE — SUT VICRYL+ 2-0 SH 18IN

## (undated) DEVICE — STRAP OR TABLE 5IN X 72IN

## (undated) DEVICE — SYR COMBO 1ML 27G .5 IN SAFETY

## (undated) DEVICE — SUT SILK 3-0 SH 18IN BLACK

## (undated) DEVICE — BLADE SURG CARBON STEEL SZ11

## (undated) DEVICE — SPONGE DERMA 8PLY 2X2

## (undated) DEVICE — DISSECTOR LIGASURE EXACT 21CM

## (undated) DEVICE — SUT 3-0 12-18IN SILK

## (undated) DEVICE — SUT 4-0 VICRYL / SH

## (undated) DEVICE — SPONGE LAP 18X18 PREWASHED

## (undated) DEVICE — SPONGE GAUZE 16PLY 4X4

## (undated) DEVICE — SYR 10CC LUER LOCK

## (undated) DEVICE — DRESSING MEPILEX BORDER AG 4X4

## (undated) DEVICE — BANDAGE ESMARK ELASTIC ST 6X9

## (undated) DEVICE — TOWEL OR DISP STRL BLUE 4/PK